# Patient Record
Sex: FEMALE | Race: OTHER | NOT HISPANIC OR LATINO | URBAN - METROPOLITAN AREA
[De-identification: names, ages, dates, MRNs, and addresses within clinical notes are randomized per-mention and may not be internally consistent; named-entity substitution may affect disease eponyms.]

---

## 2017-02-21 PROBLEM — Z00.00 ENCOUNTER FOR PREVENTIVE HEALTH EXAMINATION: Status: ACTIVE | Noted: 2017-02-21

## 2017-05-12 PROBLEM — Z00.00 ENCOUNTER FOR PREVENTIVE HEALTH EXAMINATION: Noted: 2017-05-12

## 2017-05-15 ENCOUNTER — OUTPATIENT (OUTPATIENT)
Dept: OUTPATIENT SERVICES | Facility: HOSPITAL | Age: 67
LOS: 1 days | End: 2017-05-15
Payer: MEDICARE

## 2017-05-15 ENCOUNTER — APPOINTMENT (OUTPATIENT)
Dept: CT IMAGING | Facility: IMAGING CENTER | Age: 67
End: 2017-05-15

## 2017-05-15 DIAGNOSIS — E61.1 IRON DEFICIENCY: ICD-10-CM

## 2017-05-15 DIAGNOSIS — K92.0 HEMATEMESIS: ICD-10-CM

## 2017-05-15 DIAGNOSIS — R10.9 UNSPECIFIED ABDOMINAL PAIN: ICD-10-CM

## 2017-05-15 DIAGNOSIS — K92.1 MELENA: ICD-10-CM

## 2017-05-15 PROCEDURE — 74177 CT ABD & PELVIS W/CONTRAST: CPT

## 2017-06-02 ENCOUNTER — APPOINTMENT (OUTPATIENT)
Dept: GASTROENTEROLOGY | Facility: HOSPITAL | Age: 67
End: 2017-06-02

## 2017-06-29 ENCOUNTER — RESULT REVIEW (OUTPATIENT)
Age: 67
End: 2017-06-29

## 2017-07-14 ENCOUNTER — OUTPATIENT (OUTPATIENT)
Dept: OUTPATIENT SERVICES | Facility: HOSPITAL | Age: 67
LOS: 1 days | End: 2017-07-14
Payer: MEDICARE

## 2017-07-14 ENCOUNTER — CHART COPY (OUTPATIENT)
Age: 67
End: 2017-07-14

## 2017-07-14 ENCOUNTER — RESULT REVIEW (OUTPATIENT)
Age: 67
End: 2017-07-14

## 2017-07-14 ENCOUNTER — APPOINTMENT (OUTPATIENT)
Dept: GASTROENTEROLOGY | Facility: HOSPITAL | Age: 67
End: 2017-07-14

## 2017-07-14 DIAGNOSIS — K31.9 DISEASE OF STOMACH AND DUODENUM, UNSPECIFIED: ICD-10-CM

## 2017-07-14 PROCEDURE — 88305 TISSUE EXAM BY PATHOLOGIST: CPT

## 2017-07-14 PROCEDURE — 88312 SPECIAL STAINS GROUP 1: CPT

## 2017-07-14 PROCEDURE — 43236 UPPR GI SCOPE W/SUBMUC INJ: CPT | Mod: 59,GC

## 2017-07-14 PROCEDURE — 88341 IMHCHEM/IMCYTCHM EA ADD ANTB: CPT | Mod: 26

## 2017-07-14 PROCEDURE — 88341 IMHCHEM/IMCYTCHM EA ADD ANTB: CPT

## 2017-07-14 PROCEDURE — 43239 EGD BIOPSY SINGLE/MULTIPLE: CPT | Mod: 59,GC

## 2017-07-14 PROCEDURE — 88173 CYTOPATH EVAL FNA REPORT: CPT

## 2017-07-14 PROCEDURE — 88173 CYTOPATH EVAL FNA REPORT: CPT | Mod: 26

## 2017-07-14 PROCEDURE — 88342 IMHCHEM/IMCYTCHM 1ST ANTB: CPT

## 2017-07-14 PROCEDURE — 43236 UPPR GI SCOPE W/SUBMUC INJ: CPT | Mod: XS

## 2017-07-14 PROCEDURE — 88342 IMHCHEM/IMCYTCHM 1ST ANTB: CPT | Mod: 26

## 2017-07-14 PROCEDURE — 88305 TISSUE EXAM BY PATHOLOGIST: CPT | Mod: 26

## 2017-07-14 PROCEDURE — 43239 EGD BIOPSY SINGLE/MULTIPLE: CPT

## 2017-07-14 PROCEDURE — 88312 SPECIAL STAINS GROUP 1: CPT | Mod: 26

## 2017-07-14 PROCEDURE — 43242 EGD US FINE NEEDLE BX/ASPIR: CPT | Mod: GC

## 2017-07-14 PROCEDURE — 43259 EGD US EXAM DUODENUM/JEJUNUM: CPT | Mod: XS

## 2017-07-19 LAB — NON-GYNECOLOGICAL CYTOLOGY STUDY: SIGNIFICANT CHANGE UP

## 2017-07-27 ENCOUNTER — CHART COPY (OUTPATIENT)
Age: 67
End: 2017-07-27

## 2017-08-03 ENCOUNTER — APPOINTMENT (OUTPATIENT)
Dept: SURGICAL ONCOLOGY | Facility: CLINIC | Age: 67
End: 2017-08-03
Payer: MEDICARE

## 2017-08-03 VITALS
HEART RATE: 70 BPM | SYSTOLIC BLOOD PRESSURE: 151 MMHG | WEIGHT: 174 LBS | BODY MASS INDEX: 28.99 KG/M2 | HEIGHT: 65 IN | OXYGEN SATURATION: 98 % | DIASTOLIC BLOOD PRESSURE: 118 MMHG

## 2017-08-03 DIAGNOSIS — Z86.79 PERSONAL HISTORY OF OTHER DISEASES OF THE CIRCULATORY SYSTEM: ICD-10-CM

## 2017-08-03 DIAGNOSIS — R10.9 UNSPECIFIED ABDOMINAL PAIN: ICD-10-CM

## 2017-08-03 DIAGNOSIS — K31.9 DISEASE OF STOMACH AND DUODENUM, UNSPECIFIED: ICD-10-CM

## 2017-08-03 PROCEDURE — 99204 OFFICE O/P NEW MOD 45 MIN: CPT

## 2017-08-03 RX ORDER — PREDNISONE 10 MG/1
10 TABLET ORAL
Refills: 0 | Status: ACTIVE | COMMUNITY

## 2017-08-03 RX ORDER — FUROSEMIDE 20 MG/1
20 TABLET ORAL
Refills: 0 | Status: ACTIVE | COMMUNITY

## 2017-08-03 RX ORDER — AMLODIPINE BESYLATE 10 MG/1
10 TABLET ORAL
Refills: 0 | Status: ACTIVE | COMMUNITY

## 2017-08-03 RX ORDER — LISINOPRIL 20 MG/1
20 TABLET ORAL
Refills: 0 | Status: ACTIVE | COMMUNITY

## 2017-08-09 ENCOUNTER — APPOINTMENT (OUTPATIENT)
Dept: CT IMAGING | Facility: IMAGING CENTER | Age: 67
End: 2017-08-09
Payer: MEDICARE

## 2017-08-09 ENCOUNTER — OUTPATIENT (OUTPATIENT)
Dept: OUTPATIENT SERVICES | Facility: HOSPITAL | Age: 67
LOS: 1 days | End: 2017-08-09
Payer: MEDICARE

## 2017-08-09 DIAGNOSIS — K31.9 DISEASE OF STOMACH AND DUODENUM, UNSPECIFIED: ICD-10-CM

## 2017-08-09 PROCEDURE — 71260 CT THORAX DX C+: CPT

## 2017-08-09 PROCEDURE — 74177 CT ABD & PELVIS W/CONTRAST: CPT

## 2017-08-09 PROCEDURE — 74177 CT ABD & PELVIS W/CONTRAST: CPT | Mod: 26

## 2017-08-09 PROCEDURE — 71260 CT THORAX DX C+: CPT | Mod: 26

## 2017-08-23 ENCOUNTER — APPOINTMENT (OUTPATIENT)
Dept: RHEUMATOLOGY | Facility: CLINIC | Age: 67
End: 2017-08-23

## 2017-08-24 ENCOUNTER — OUTPATIENT (OUTPATIENT)
Dept: OUTPATIENT SERVICES | Facility: HOSPITAL | Age: 67
LOS: 1 days | End: 2017-08-24
Payer: MEDICARE

## 2017-08-24 VITALS
TEMPERATURE: 99 F | SYSTOLIC BLOOD PRESSURE: 132 MMHG | HEART RATE: 70 BPM | WEIGHT: 171.96 LBS | HEIGHT: 61 IN | DIASTOLIC BLOOD PRESSURE: 84 MMHG | RESPIRATION RATE: 16 BRPM

## 2017-08-24 DIAGNOSIS — C16.9 MALIGNANT NEOPLASM OF STOMACH, UNSPECIFIED: ICD-10-CM

## 2017-08-24 DIAGNOSIS — K31.9 DISEASE OF STOMACH AND DUODENUM, UNSPECIFIED: ICD-10-CM

## 2017-08-24 DIAGNOSIS — Z98.890 OTHER SPECIFIED POSTPROCEDURAL STATES: Chronic | ICD-10-CM

## 2017-08-24 LAB
ALBUMIN SERPL ELPH-MCNC: 4 G/DL — SIGNIFICANT CHANGE UP (ref 3.3–5)
ALP SERPL-CCNC: 82 U/L — SIGNIFICANT CHANGE UP (ref 40–120)
ALT FLD-CCNC: 14 U/L — SIGNIFICANT CHANGE UP (ref 4–33)
AST SERPL-CCNC: 21 U/L — SIGNIFICANT CHANGE UP (ref 4–32)
BILIRUB SERPL-MCNC: 0.3 MG/DL — SIGNIFICANT CHANGE UP (ref 0.2–1.2)
BLD GP AB SCN SERPL QL: NEGATIVE — SIGNIFICANT CHANGE UP
BUN SERPL-MCNC: 13 MG/DL — SIGNIFICANT CHANGE UP (ref 7–23)
CALCIUM SERPL-MCNC: 9.3 MG/DL — SIGNIFICANT CHANGE UP (ref 8.4–10.5)
CHLORIDE SERPL-SCNC: 105 MMOL/L — SIGNIFICANT CHANGE UP (ref 98–107)
CO2 SERPL-SCNC: 25 MMOL/L — SIGNIFICANT CHANGE UP (ref 22–31)
CREAT SERPL-MCNC: 0.66 MG/DL — SIGNIFICANT CHANGE UP (ref 0.5–1.3)
GLUCOSE SERPL-MCNC: 95 MG/DL — SIGNIFICANT CHANGE UP (ref 70–99)
HBA1C BLD-MCNC: 5.7 % — HIGH (ref 4–5.6)
HCT VFR BLD CALC: 34 % — LOW (ref 34.5–45)
HGB BLD-MCNC: 10.6 G/DL — LOW (ref 11.5–15.5)
MCHC RBC-ENTMCNC: 25.5 PG — LOW (ref 27–34)
MCHC RBC-ENTMCNC: 31.2 % — LOW (ref 32–36)
MCV RBC AUTO: 81.9 FL — SIGNIFICANT CHANGE UP (ref 80–100)
NRBC # FLD: 0 — SIGNIFICANT CHANGE UP
PLATELET # BLD AUTO: 284 K/UL — SIGNIFICANT CHANGE UP (ref 150–400)
PMV BLD: 10.9 FL — SIGNIFICANT CHANGE UP (ref 7–13)
POTASSIUM SERPL-MCNC: 3.8 MMOL/L — SIGNIFICANT CHANGE UP (ref 3.5–5.3)
POTASSIUM SERPL-SCNC: 3.8 MMOL/L — SIGNIFICANT CHANGE UP (ref 3.5–5.3)
PROT SERPL-MCNC: 6.6 G/DL — SIGNIFICANT CHANGE UP (ref 6–8.3)
RBC # BLD: 4.15 M/UL — SIGNIFICANT CHANGE UP (ref 3.8–5.2)
RBC # FLD: 15 % — HIGH (ref 10.3–14.5)
RH IG SCN BLD-IMP: POSITIVE — SIGNIFICANT CHANGE UP
SODIUM SERPL-SCNC: 143 MMOL/L — SIGNIFICANT CHANGE UP (ref 135–145)
WBC # BLD: 5.35 K/UL — SIGNIFICANT CHANGE UP (ref 3.8–10.5)
WBC # FLD AUTO: 5.35 K/UL — SIGNIFICANT CHANGE UP (ref 3.8–10.5)

## 2017-08-24 PROCEDURE — 93010 ELECTROCARDIOGRAM REPORT: CPT

## 2017-08-24 RX ORDER — SODIUM CHLORIDE 9 MG/ML
1000 INJECTION, SOLUTION INTRAVENOUS
Qty: 0 | Refills: 0 | Status: DISCONTINUED | OUTPATIENT
Start: 2017-09-06 | End: 2017-09-06

## 2017-08-24 NOTE — H&P PST ADULT - ACTIVITY
utilizes stairs in the home daily, denies SOB, utilizes subway stairs 5 days/wk, only SOB with subway stair climbing "when I'm carrying something heavy"

## 2017-08-24 NOTE — H&P PST ADULT - VISION (WITH CORRECTIVE LENSES IF THE PATIENT USUALLY WEARS THEM):
Glasses for reading/Normal vision: sees adequately in most situations; can see medication labels, newsprint

## 2017-08-24 NOTE — H&P PST ADULT - PMH
Hypertension    RA (rheumatoid arthritis)    Stomach neoplasm Hypertension    Obese    RA (rheumatoid arthritis)    Stomach cancer Hypertension    Obese    RA (rheumatoid arthritis)    Stomach cancer    Thyroid nodule

## 2017-08-24 NOTE — H&P PST ADULT - ATTENDING COMMENTS
D/w pt plan for robotic poss open partial gastrectomy, EGD and poss feeding jejunostomy (if proximal gastrectomy)    Discussed r/b/a post op expectations, poss complications.      Pt understands and agrees to proceed.

## 2017-08-24 NOTE — H&P PST ADULT - PROBLEM SELECTOR PLAN 1
Pt. is scheduled for a laparoscopic robotic assisted partial gastrectomy possible open upper EGD 9/6/17.  Instructed pt. to obtain medical clearance.

## 2017-08-24 NOTE — H&P PST ADULT - HISTORY OF PRESENT ILLNESS
Pt. is a 67 yo female that has anemia of unknown origin.  Diagnostic testing revealed a stomach mass.

## 2017-09-06 ENCOUNTER — INPATIENT (INPATIENT)
Facility: HOSPITAL | Age: 67
LOS: 4 days | Discharge: NURSING, REHAB OR LONG-TERM CARE | End: 2017-09-11
Attending: SURGERY | Admitting: SURGERY
Payer: MEDICARE

## 2017-09-06 ENCOUNTER — TRANSCRIPTION ENCOUNTER (OUTPATIENT)
Age: 67
End: 2017-09-06

## 2017-09-06 ENCOUNTER — APPOINTMENT (OUTPATIENT)
Dept: SURGICAL ONCOLOGY | Facility: HOSPITAL | Age: 67
End: 2017-09-06

## 2017-09-06 VITALS
TEMPERATURE: 98 F | DIASTOLIC BLOOD PRESSURE: 68 MMHG | SYSTOLIC BLOOD PRESSURE: 150 MMHG | HEART RATE: 68 BPM | OXYGEN SATURATION: 99 % | RESPIRATION RATE: 16 BRPM | WEIGHT: 171.96 LBS | HEIGHT: 61 IN

## 2017-09-06 DIAGNOSIS — K31.9 DISEASE OF STOMACH AND DUODENUM, UNSPECIFIED: ICD-10-CM

## 2017-09-06 DIAGNOSIS — Z98.890 OTHER SPECIFIED POSTPROCEDURAL STATES: Chronic | ICD-10-CM

## 2017-09-06 LAB
BUN SERPL-MCNC: 16 MG/DL — SIGNIFICANT CHANGE UP (ref 7–23)
CALCIUM SERPL-MCNC: 8.9 MG/DL — SIGNIFICANT CHANGE UP (ref 8.4–10.5)
CHLORIDE SERPL-SCNC: 106 MMOL/L — SIGNIFICANT CHANGE UP (ref 98–107)
CO2 SERPL-SCNC: 23 MMOL/L — SIGNIFICANT CHANGE UP (ref 22–31)
CREAT SERPL-MCNC: 0.67 MG/DL — SIGNIFICANT CHANGE UP (ref 0.5–1.3)
GLUCOSE SERPL-MCNC: 115 MG/DL — HIGH (ref 70–99)
HCT VFR BLD CALC: 33 % — LOW (ref 34.5–45)
HGB BLD-MCNC: 10.4 G/DL — LOW (ref 11.5–15.5)
HIV1 AG SER QL: SIGNIFICANT CHANGE UP
HIV1+2 AB SPEC QL: SIGNIFICANT CHANGE UP
MCHC RBC-ENTMCNC: 25.4 PG — LOW (ref 27–34)
MCHC RBC-ENTMCNC: 31.5 % — LOW (ref 32–36)
MCV RBC AUTO: 80.7 FL — SIGNIFICANT CHANGE UP (ref 80–100)
NRBC # FLD: 0 — SIGNIFICANT CHANGE UP
PLATELET # BLD AUTO: 250 K/UL — SIGNIFICANT CHANGE UP (ref 150–400)
PMV BLD: 10.6 FL — SIGNIFICANT CHANGE UP (ref 7–13)
POTASSIUM SERPL-MCNC: 3.5 MMOL/L — SIGNIFICANT CHANGE UP (ref 3.5–5.3)
POTASSIUM SERPL-SCNC: 3.5 MMOL/L — SIGNIFICANT CHANGE UP (ref 3.5–5.3)
RBC # BLD: 4.09 M/UL — SIGNIFICANT CHANGE UP (ref 3.8–5.2)
RBC # FLD: 14.9 % — HIGH (ref 10.3–14.5)
RH IG SCN BLD-IMP: POSITIVE — SIGNIFICANT CHANGE UP
SODIUM SERPL-SCNC: 142 MMOL/L — SIGNIFICANT CHANGE UP (ref 135–145)
WBC # BLD: 13.26 K/UL — HIGH (ref 3.8–10.5)
WBC # FLD AUTO: 13.26 K/UL — HIGH (ref 3.8–10.5)

## 2017-09-06 PROCEDURE — 49204: CPT

## 2017-09-06 PROCEDURE — 43235 EGD DIAGNOSTIC BRUSH WASH: CPT

## 2017-09-06 PROCEDURE — 49320 DIAG LAPARO SEPARATE PROC: CPT | Mod: 59

## 2017-09-06 PROCEDURE — 43611 EXCISION OF STOMACH LESION: CPT

## 2017-09-06 PROCEDURE — S2900 ROBOTIC SURGICAL SYSTEM: CPT | Mod: NC

## 2017-09-06 PROCEDURE — 51702 INSERT TEMP BLADDER CATH: CPT | Mod: 59

## 2017-09-06 RX ORDER — ACETAMINOPHEN 500 MG
1000 TABLET ORAL ONCE
Qty: 0 | Refills: 0 | Status: COMPLETED | OUTPATIENT
Start: 2017-09-06 | End: 2017-09-06

## 2017-09-06 RX ORDER — ONDANSETRON 8 MG/1
4 TABLET, FILM COATED ORAL ONCE
Qty: 0 | Refills: 0 | Status: DISCONTINUED | OUTPATIENT
Start: 2017-09-06 | End: 2017-09-06

## 2017-09-06 RX ORDER — ONDANSETRON 8 MG/1
4 TABLET, FILM COATED ORAL EVERY 6 HOURS
Qty: 0 | Refills: 0 | Status: DISCONTINUED | OUTPATIENT
Start: 2017-09-06 | End: 2017-09-08

## 2017-09-06 RX ORDER — HYDROMORPHONE HYDROCHLORIDE 2 MG/ML
30 INJECTION INTRAMUSCULAR; INTRAVENOUS; SUBCUTANEOUS
Qty: 0 | Refills: 0 | Status: DISCONTINUED | OUTPATIENT
Start: 2017-09-06 | End: 2017-09-08

## 2017-09-06 RX ORDER — HYDROMORPHONE HYDROCHLORIDE 2 MG/ML
1 INJECTION INTRAMUSCULAR; INTRAVENOUS; SUBCUTANEOUS
Qty: 0 | Refills: 0 | Status: DISCONTINUED | OUTPATIENT
Start: 2017-09-06 | End: 2017-09-06

## 2017-09-06 RX ORDER — DIPHENHYDRAMINE HCL 50 MG
25 CAPSULE ORAL EVERY 4 HOURS
Qty: 0 | Refills: 0 | Status: DISCONTINUED | OUTPATIENT
Start: 2017-09-06 | End: 2017-09-08

## 2017-09-06 RX ORDER — KETOROLAC TROMETHAMINE 30 MG/ML
30 SYRINGE (ML) INJECTION EVERY 6 HOURS
Qty: 0 | Refills: 0 | Status: DISCONTINUED | OUTPATIENT
Start: 2017-09-07 | End: 2017-09-11

## 2017-09-06 RX ORDER — SODIUM CHLORIDE 9 MG/ML
1000 INJECTION, SOLUTION INTRAVENOUS
Qty: 0 | Refills: 0 | Status: DISCONTINUED | OUTPATIENT
Start: 2017-09-06 | End: 2017-09-07

## 2017-09-06 RX ORDER — NALOXONE HYDROCHLORIDE 4 MG/.1ML
0.1 SPRAY NASAL
Qty: 0 | Refills: 0 | Status: DISCONTINUED | OUTPATIENT
Start: 2017-09-06 | End: 2017-09-08

## 2017-09-06 RX ORDER — ACETAMINOPHEN 500 MG
1000 TABLET ORAL ONCE
Qty: 0 | Refills: 0 | Status: COMPLETED | OUTPATIENT
Start: 2017-09-07 | End: 2017-09-07

## 2017-09-06 RX ORDER — HEPARIN SODIUM 5000 [USP'U]/ML
5000 INJECTION INTRAVENOUS; SUBCUTANEOUS EVERY 8 HOURS
Qty: 0 | Refills: 0 | Status: DISCONTINUED | OUTPATIENT
Start: 2017-09-06 | End: 2017-09-07

## 2017-09-06 RX ORDER — HYDROMORPHONE HYDROCHLORIDE 2 MG/ML
0.5 INJECTION INTRAMUSCULAR; INTRAVENOUS; SUBCUTANEOUS
Qty: 0 | Refills: 0 | Status: DISCONTINUED | OUTPATIENT
Start: 2017-09-06 | End: 2017-09-06

## 2017-09-06 RX ORDER — PANTOPRAZOLE SODIUM 20 MG/1
40 TABLET, DELAYED RELEASE ORAL
Qty: 0 | Refills: 0 | Status: DISCONTINUED | OUTPATIENT
Start: 2017-09-06 | End: 2017-09-09

## 2017-09-06 RX ORDER — HEPARIN SODIUM 5000 [USP'U]/ML
5000 INJECTION INTRAVENOUS; SUBCUTANEOUS ONCE
Qty: 0 | Refills: 0 | Status: COMPLETED | OUTPATIENT
Start: 2017-09-06 | End: 2017-09-06

## 2017-09-06 RX ORDER — HYDROMORPHONE HYDROCHLORIDE 2 MG/ML
0.5 INJECTION INTRAMUSCULAR; INTRAVENOUS; SUBCUTANEOUS
Qty: 0 | Refills: 0 | Status: DISCONTINUED | OUTPATIENT
Start: 2017-09-06 | End: 2017-09-08

## 2017-09-06 RX ADMIN — PANTOPRAZOLE SODIUM 40 MILLIGRAM(S): 20 TABLET, DELAYED RELEASE ORAL at 18:43

## 2017-09-06 RX ADMIN — Medication 400 MILLIGRAM(S): at 22:26

## 2017-09-06 RX ADMIN — SODIUM CHLORIDE 30 MILLILITER(S): 9 INJECTION, SOLUTION INTRAVENOUS at 11:55

## 2017-09-06 RX ADMIN — HYDROMORPHONE HYDROCHLORIDE 30 MILLILITER(S): 2 INJECTION INTRAMUSCULAR; INTRAVENOUS; SUBCUTANEOUS at 17:55

## 2017-09-06 RX ADMIN — HEPARIN SODIUM 5000 UNIT(S): 5000 INJECTION INTRAVENOUS; SUBCUTANEOUS at 11:58

## 2017-09-06 RX ADMIN — HEPARIN SODIUM 5000 UNIT(S): 5000 INJECTION INTRAVENOUS; SUBCUTANEOUS at 22:32

## 2017-09-06 RX ADMIN — Medication 1000 MILLIGRAM(S): at 23:00

## 2017-09-06 NOTE — BRIEF OPERATIVE NOTE - OPERATION/FINDINGS
-EGD performed, mass identified in fundus  -robotic laparoscopic partial gastrectomy (stapled resection of mass, no violation of GE junction)  -post-resection EGD confirmed patent GE junction and good hemostasis

## 2017-09-06 NOTE — BRIEF OPERATIVE NOTE - PROCEDURE
EGD (esophagogastroduodenoscopy)  09/06/2017    Active  [id number]  Partial gastrectomy  09/06/2017  robotic laparoscopic  Active  [id number]

## 2017-09-07 ENCOUNTER — RESULT REVIEW (OUTPATIENT)
Age: 67
End: 2017-09-07

## 2017-09-07 LAB
BUN SERPL-MCNC: 8 MG/DL — SIGNIFICANT CHANGE UP (ref 7–23)
CALCIUM SERPL-MCNC: 8.7 MG/DL — SIGNIFICANT CHANGE UP (ref 8.4–10.5)
CHLORIDE SERPL-SCNC: 105 MMOL/L — SIGNIFICANT CHANGE UP (ref 98–107)
CO2 SERPL-SCNC: 25 MMOL/L — SIGNIFICANT CHANGE UP (ref 22–31)
CREAT SERPL-MCNC: 0.56 MG/DL — SIGNIFICANT CHANGE UP (ref 0.5–1.3)
GLUCOSE SERPL-MCNC: 90 MG/DL — SIGNIFICANT CHANGE UP (ref 70–99)
HBV CORE AB SER-ACNC: REACTIVE — SIGNIFICANT CHANGE UP
HBV SURFACE AB SER-ACNC: REACTIVE — SIGNIFICANT CHANGE UP
HBV SURFACE AG SER-ACNC: NONREACTIVE — SIGNIFICANT CHANGE UP
HCT VFR BLD CALC: 31.1 % — LOW (ref 34.5–45)
HCV AB S/CO SERPL IA: 0.09 S/CO — SIGNIFICANT CHANGE UP
HCV AB SERPL-IMP: SIGNIFICANT CHANGE UP
HCV RNA SERPL NAA DL=5-ACNC: NOT DETECTED IU/ML — SIGNIFICANT CHANGE UP
HCV RNA SPEC NAA+PROBE-LOG IU: SIGNIFICANT CHANGE UP LOGIU/ML
HGB BLD-MCNC: 9.9 G/DL — LOW (ref 11.5–15.5)
MCHC RBC-ENTMCNC: 25.9 PG — LOW (ref 27–34)
MCHC RBC-ENTMCNC: 31.8 % — LOW (ref 32–36)
MCV RBC AUTO: 81.4 FL — SIGNIFICANT CHANGE UP (ref 80–100)
NRBC # FLD: 0 — SIGNIFICANT CHANGE UP
PLATELET # BLD AUTO: 222 K/UL — SIGNIFICANT CHANGE UP (ref 150–400)
PMV BLD: 10.6 FL — SIGNIFICANT CHANGE UP (ref 7–13)
POTASSIUM SERPL-MCNC: 3.7 MMOL/L — SIGNIFICANT CHANGE UP (ref 3.5–5.3)
POTASSIUM SERPL-SCNC: 3.7 MMOL/L — SIGNIFICANT CHANGE UP (ref 3.5–5.3)
RBC # BLD: 3.82 M/UL — SIGNIFICANT CHANGE UP (ref 3.8–5.2)
RBC # FLD: 14.8 % — HIGH (ref 10.3–14.5)
SODIUM SERPL-SCNC: 142 MMOL/L — SIGNIFICANT CHANGE UP (ref 135–145)
WBC # BLD: 7.1 K/UL — SIGNIFICANT CHANGE UP (ref 3.8–10.5)
WBC # FLD AUTO: 7.1 K/UL — SIGNIFICANT CHANGE UP (ref 3.8–10.5)

## 2017-09-07 PROCEDURE — 88309 TISSUE EXAM BY PATHOLOGIST: CPT | Mod: 26

## 2017-09-07 PROCEDURE — 88341 IMHCHEM/IMCYTCHM EA ADD ANTB: CPT | Mod: 26

## 2017-09-07 PROCEDURE — 88342 IMHCHEM/IMCYTCHM 1ST ANTB: CPT | Mod: 26

## 2017-09-07 RX ORDER — DEXTROSE MONOHYDRATE, SODIUM CHLORIDE, AND POTASSIUM CHLORIDE 50; .745; 4.5 G/1000ML; G/1000ML; G/1000ML
1000 INJECTION, SOLUTION INTRAVENOUS
Qty: 0 | Refills: 0 | Status: DISCONTINUED | OUTPATIENT
Start: 2017-09-07 | End: 2017-09-08

## 2017-09-07 RX ORDER — ENOXAPARIN SODIUM 100 MG/ML
40 INJECTION SUBCUTANEOUS DAILY
Qty: 0 | Refills: 0 | Status: DISCONTINUED | OUTPATIENT
Start: 2017-09-07 | End: 2017-09-11

## 2017-09-07 RX ORDER — POTASSIUM CHLORIDE 20 MEQ
10 PACKET (EA) ORAL
Qty: 0 | Refills: 0 | Status: COMPLETED | OUTPATIENT
Start: 2017-09-07 | End: 2017-09-07

## 2017-09-07 RX ADMIN — DEXTROSE MONOHYDRATE, SODIUM CHLORIDE, AND POTASSIUM CHLORIDE 115 MILLILITER(S): 50; .745; 4.5 INJECTION, SOLUTION INTRAVENOUS at 16:48

## 2017-09-07 RX ADMIN — Medication 1000 MILLIGRAM(S): at 11:17

## 2017-09-07 RX ADMIN — Medication 30 MILLIGRAM(S): at 18:48

## 2017-09-07 RX ADMIN — HEPARIN SODIUM 5000 UNIT(S): 5000 INJECTION INTRAVENOUS; SUBCUTANEOUS at 14:36

## 2017-09-07 RX ADMIN — Medication 30 MILLIGRAM(S): at 07:00

## 2017-09-07 RX ADMIN — Medication 1000 MILLIGRAM(S): at 06:02

## 2017-09-07 RX ADMIN — Medication 30 MILLIGRAM(S): at 01:21

## 2017-09-07 RX ADMIN — Medication 400 MILLIGRAM(S): at 05:32

## 2017-09-07 RX ADMIN — Medication 100 MILLIEQUIVALENT(S): at 12:31

## 2017-09-07 RX ADMIN — PANTOPRAZOLE SODIUM 40 MILLIGRAM(S): 20 TABLET, DELAYED RELEASE ORAL at 05:31

## 2017-09-07 RX ADMIN — Medication 30 MILLIGRAM(S): at 13:12

## 2017-09-07 RX ADMIN — Medication 100 MILLIEQUIVALENT(S): at 14:36

## 2017-09-07 RX ADMIN — Medication 400 MILLIGRAM(S): at 10:38

## 2017-09-07 RX ADMIN — Medication 100 MILLIEQUIVALENT(S): at 16:45

## 2017-09-07 RX ADMIN — HEPARIN SODIUM 5000 UNIT(S): 5000 INJECTION INTRAVENOUS; SUBCUTANEOUS at 05:31

## 2017-09-07 RX ADMIN — Medication 30 MILLIGRAM(S): at 06:25

## 2017-09-07 RX ADMIN — PANTOPRAZOLE SODIUM 40 MILLIGRAM(S): 20 TABLET, DELAYED RELEASE ORAL at 18:41

## 2017-09-07 RX ADMIN — HYDROMORPHONE HYDROCHLORIDE 30 MILLILITER(S): 2 INJECTION INTRAMUSCULAR; INTRAVENOUS; SUBCUTANEOUS at 07:08

## 2017-09-07 RX ADMIN — Medication 30 MILLIGRAM(S): at 18:41

## 2017-09-07 RX ADMIN — SODIUM CHLORIDE 125 MILLILITER(S): 9 INJECTION, SOLUTION INTRAVENOUS at 11:20

## 2017-09-07 RX ADMIN — ENOXAPARIN SODIUM 40 MILLIGRAM(S): 100 INJECTION SUBCUTANEOUS at 21:53

## 2017-09-07 RX ADMIN — Medication 30 MILLIGRAM(S): at 02:00

## 2017-09-07 RX ADMIN — Medication 30 MILLIGRAM(S): at 12:32

## 2017-09-07 RX ADMIN — Medication 1000 MILLIGRAM(S): at 17:07

## 2017-09-07 RX ADMIN — Medication 400 MILLIGRAM(S): at 16:48

## 2017-09-07 RX ADMIN — HYDROMORPHONE HYDROCHLORIDE 30 MILLILITER(S): 2 INJECTION INTRAMUSCULAR; INTRAVENOUS; SUBCUTANEOUS at 19:17

## 2017-09-07 NOTE — PROGRESS NOTE ADULT - SUBJECTIVE AND OBJECTIVE BOX
Anesthesia Pain Management Service    SUBJECTIVE: Patient is doing well with IV PCA and no significant problems reported.    Pain Scale Score	At rest: ___ 	With Activity: ___ 	[X ] Refer to charted pain scores    THERAPY:    [ ] IV PCA Morphine		[ ] 5 mg/mL	[ ] 1 mg/mL  [X ] IV PCA Hydromorphone	[ ] 5 mg/mL	[X ] 1 mg/mL  [ ] IV PCA Fentanyl		[ ] 50 micrograms/mL    Demand dose __0.2_ lockout __6_ (minutes) Continuous Rate _0__ Total: _1__  mg used (in past 24 hours)      MEDICATIONS  (STANDING):  HYDROmorphone PCA (1 mG/mL) 30 milliLiter(s) PCA Continuous PCA Continuous  ketorolac   Injectable 30 milliGRAM(s) IV Push every 6 hours  pantoprazole  Injectable 40 milliGRAM(s) IV Push two times a day  dextrose 5% + sodium chloride 0.45% with potassium chloride 20 mEq/L 1000 milliLiter(s) (115 mL/Hr) IV Continuous <Continuous>  enoxaparin Injectable 40 milliGRAM(s) SubCutaneous daily    MEDICATIONS  (PRN):  HYDROmorphone PCA (1 mG/mL) Rescue Clinician Bolus 0.5 milliGRAM(s) IV Push every 15 minutes PRN for Pain Scale GREATER THAN 6  naloxone Injectable 0.1 milliGRAM(s) IV Push every 3 minutes PRN For ANY of the following changes in patient status:  A. RR LESS THAN 10 breaths per minute, B. Oxygen saturation LESS THAN 90%, C. Sedation score of 6  ondansetron Injectable 4 milliGRAM(s) IV Push every 6 hours PRN Nausea  diphenhydrAMINE   Injectable 25 milliGRAM(s) IV Push every 4 hours PRN Pruritus      OBJECTIVE:    Sedation Score:	[ X] Alert	[ ] Drowsy 	[ ] Arousable	[ ] Asleep	[ ] Unresponsive    Side Effects:	[X ] None	[ ] Nausea	[ ] Vomiting	[ ] Pruritus  		[ ] Other:    Vital Signs Last 24 Hrs  T(C): 36.6 (07 Sep 2017 16:28), Max: 37.2 (07 Sep 2017 07:58)  T(F): 97.8 (07 Sep 2017 16:28), Max: 98.9 (07 Sep 2017 07:58)  HR: 82 (07 Sep 2017 16:28) (61 - 85)  BP: 137/57 (07 Sep 2017 16:28) (131/60 - 149/64)  BP(mean): --  RR: 18 (07 Sep 2017 16:28) (13 - 18)  SpO2: 97% (07 Sep 2017 16:28) (97% - 100%)    ASSESSMENT/ PLAN    Therapy to  be:	[ X] Continue   [ ] Discontinued   [ ] Change to prn Analgesics    Documentation and Verification of current medications:   [X] Done	[ ] Not done, not elligible    Comments: npo    Patient was seen 09-07-17 @ 18:39

## 2017-09-07 NOTE — PROGRESS NOTE ADULT - SUBJECTIVE AND OBJECTIVE BOX
ANESTHESIA POSTOP CHECK    66y Female POSTOP DAY 1 S/P     Vital Signs Last 24 Hrs  T(C): 36.6 (07 Sep 2017 16:28), Max: 37.2 (07 Sep 2017 07:58)  T(F): 97.8 (07 Sep 2017 16:28), Max: 98.9 (07 Sep 2017 07:58)  HR: 82 (07 Sep 2017 16:28) (61 - 85)  BP: 137/57 (07 Sep 2017 16:28) (131/60 - 149/64)  BP(mean): --  RR: 18 (07 Sep 2017 16:28) (13 - 18)  SpO2: 97% (07 Sep 2017 16:28) (97% - 100%)  I&O's Summary    06 Sep 2017 07:01  -  07 Sep 2017 07:00  --------------------------------------------------------  IN: 1625 mL / OUT: 2065 mL / NET: -440 mL    07 Sep 2017 07:01  -  07 Sep 2017 18:40  --------------------------------------------------------  IN: 500 mL / OUT: 600 mL / NET: -100 mL        [X ] NO APPARENT ANESTHESIA COMPLICATIONS      Comments:

## 2017-09-07 NOTE — PROGRESS NOTE ADULT - SUBJECTIVE AND OBJECTIVE BOX
D-Team (Surgical Oncology) Daily Progress Note    SUBJECTIVE:  Pt seen and examined, and is resting comfortably in bed. No acute events overnight. Pain is adequately controlled on current regimen. Pt has no complaints at this time. Negative for flatus and BM.     OBJECTIVE:  Vital Signs Last 24 Hrs  T(C): 36.6 (07 Sep 2017 16:28), Max: 37.2 (07 Sep 2017 07:58)  T(F): 97.8 (07 Sep 2017 16:28), Max: 98.9 (07 Sep 2017 07:58)  HR: 82 (07 Sep 2017 16:28) (61 - 89)  BP: 137/57 (07 Sep 2017 16:28) (131/60 - 160/71)  BP(mean): --  RR: 18 (07 Sep 2017 16:28) (13 - 22)  SpO2: 97% (07 Sep 2017 16:28) (94% - 100%)    I&O's Detail    06 Sep 2017 07:01  -  07 Sep 2017 07:00  --------------------------------------------------------  IN:    lactated ringers.: 1625 mL  Total IN: 1625 mL    OUT:    Indwelling Catheter - Urethral: 2065 mL  Total OUT: 2065 mL    Total NET: -440 mL      07 Sep 2017 07:01  -  07 Sep 2017 17:44  --------------------------------------------------------  IN:    lactated ringers.: 500 mL  Total IN: 500 mL    OUT:    Indwelling Catheter - Urethral: 600 mL  Total OUT: 600 mL    Total NET: -100 mL          Exam:  GEN: A&Ox3, NAD  HEENT: atraumatic, normocephalic  CV: no JVD  RESP: no increased work of breathing, no use of accessory muscles  GI/ABD: soft, minimally distended, incisions c/d/i x5  : deferred  EXTREMITIES: warm, pink, well-perfused                          9.9    7.10  )-----------( 222      ( 07 Sep 2017 06:33 )             31.1       09-07    142  |  105  |  8   ----------------------------<  90  3.7   |  25  |  0.56    Ca    8.7      07 Sep 2017 06:33            ASSESSMENT:  66F s/p lap robotic-assisted partial gastrectomy    PLAN:    - CLD  - d/u tony in AM  - OOB, ambulate, IS  - F/u GI function  -DVT prophylaxis      Alda Jimenez MD PGY-1  pager: [phone]

## 2017-09-07 NOTE — PROGRESS NOTE ADULT - SUBJECTIVE AND OBJECTIVE BOX
SURGICAL ONCOLOGY PROGRESS NOTE    No complaints, resting comfortably    Vital Signs Last 24 Hrs  T(C): 37.2 (07 Sep 2017 07:58), Max: 37.2 (07 Sep 2017 07:58)  T(F): 98.9 (07 Sep 2017 07:58), Max: 98.9 (07 Sep 2017 07:58)  HR: 61 (07 Sep 2017 07:58) (61 - 89)  BP: 131/60 (07 Sep 2017 07:58) (131/60 - 170/66)  BP(mean): --  RR: 18 (07 Sep 2017 07:58) (13 - 22)  SpO2: 100% (07 Sep 2017 07:58) (94% - 100%)  I&O's Detail    06 Sep 2017 07:01  -  07 Sep 2017 07:00  --------------------------------------------------------  IN:    lactated ringers.: 1625 mL  Total IN: 1625 mL    OUT:    Indwelling Catheter - Urethral: 2065 mL  Total OUT: 2065 mL    Total NET: -440 mL          PE:    A&A  NAD    soft, NT, ND, No peritoneal signs    inc  clean, dry, intact                          9.9    7.10  )-----------( 222      ( 07 Sep 2017 06:33 )             31.1     09-07    142  |  105  |  8   ----------------------------<  90  3.7   |  25  |  0.56    Ca    8.7      07 Sep 2017 06:33

## 2017-09-08 LAB
BUN SERPL-MCNC: 5 MG/DL — LOW (ref 7–23)
CALCIUM SERPL-MCNC: 8.9 MG/DL — SIGNIFICANT CHANGE UP (ref 8.4–10.5)
CHLORIDE SERPL-SCNC: 105 MMOL/L — SIGNIFICANT CHANGE UP (ref 98–107)
CO2 SERPL-SCNC: 25 MMOL/L — SIGNIFICANT CHANGE UP (ref 22–31)
CREAT SERPL-MCNC: 0.52 MG/DL — SIGNIFICANT CHANGE UP (ref 0.5–1.3)
GLUCOSE SERPL-MCNC: 98 MG/DL — SIGNIFICANT CHANGE UP (ref 70–99)
HCT VFR BLD CALC: 32.1 % — LOW (ref 34.5–45)
HGB BLD-MCNC: 10.2 G/DL — LOW (ref 11.5–15.5)
MAGNESIUM SERPL-MCNC: 1.7 MG/DL — SIGNIFICANT CHANGE UP (ref 1.6–2.6)
MCHC RBC-ENTMCNC: 25.8 PG — LOW (ref 27–34)
MCHC RBC-ENTMCNC: 31.8 % — LOW (ref 32–36)
MCV RBC AUTO: 81.1 FL — SIGNIFICANT CHANGE UP (ref 80–100)
NRBC # FLD: 0 — SIGNIFICANT CHANGE UP
PHOSPHATE SERPL-MCNC: 3.1 MG/DL — SIGNIFICANT CHANGE UP (ref 2.5–4.5)
PLATELET # BLD AUTO: 228 K/UL — SIGNIFICANT CHANGE UP (ref 150–400)
PMV BLD: 11 FL — SIGNIFICANT CHANGE UP (ref 7–13)
POTASSIUM SERPL-MCNC: 3.9 MMOL/L — SIGNIFICANT CHANGE UP (ref 3.5–5.3)
POTASSIUM SERPL-SCNC: 3.9 MMOL/L — SIGNIFICANT CHANGE UP (ref 3.5–5.3)
RBC # BLD: 3.96 M/UL — SIGNIFICANT CHANGE UP (ref 3.8–5.2)
RBC # FLD: 14.6 % — HIGH (ref 10.3–14.5)
SODIUM SERPL-SCNC: 142 MMOL/L — SIGNIFICANT CHANGE UP (ref 135–145)
WBC # BLD: 5.79 K/UL — SIGNIFICANT CHANGE UP (ref 3.8–10.5)
WBC # FLD AUTO: 5.79 K/UL — SIGNIFICANT CHANGE UP (ref 3.8–10.5)

## 2017-09-08 RX ORDER — OXYCODONE HYDROCHLORIDE 5 MG/1
5 TABLET ORAL EVERY 6 HOURS
Qty: 0 | Refills: 0 | Status: DISCONTINUED | OUTPATIENT
Start: 2017-09-08 | End: 2017-09-11

## 2017-09-08 RX ORDER — MAGNESIUM SULFATE 500 MG/ML
2 VIAL (ML) INJECTION ONCE
Qty: 0 | Refills: 0 | Status: COMPLETED | OUTPATIENT
Start: 2017-09-08 | End: 2017-09-08

## 2017-09-08 RX ORDER — OXYCODONE HYDROCHLORIDE 5 MG/1
10 TABLET ORAL EVERY 6 HOURS
Qty: 0 | Refills: 0 | Status: DISCONTINUED | OUTPATIENT
Start: 2017-09-08 | End: 2017-09-11

## 2017-09-08 RX ADMIN — Medication 30 MILLIGRAM(S): at 14:00

## 2017-09-08 RX ADMIN — Medication 30 MILLIGRAM(S): at 02:00

## 2017-09-08 RX ADMIN — Medication 30 MILLIGRAM(S): at 06:20

## 2017-09-08 RX ADMIN — Medication 30 MILLIGRAM(S): at 13:34

## 2017-09-08 RX ADMIN — Medication 30 MILLIGRAM(S): at 06:02

## 2017-09-08 RX ADMIN — OXYCODONE HYDROCHLORIDE 10 MILLIGRAM(S): 5 TABLET ORAL at 18:27

## 2017-09-08 RX ADMIN — PANTOPRAZOLE SODIUM 40 MILLIGRAM(S): 20 TABLET, DELAYED RELEASE ORAL at 06:03

## 2017-09-08 RX ADMIN — Medication 30 MILLIGRAM(S): at 01:42

## 2017-09-08 RX ADMIN — Medication 50 GRAM(S): at 13:34

## 2017-09-08 RX ADMIN — OXYCODONE HYDROCHLORIDE 10 MILLIGRAM(S): 5 TABLET ORAL at 17:46

## 2017-09-08 RX ADMIN — Medication 30 MILLIGRAM(S): at 18:32

## 2017-09-08 RX ADMIN — ENOXAPARIN SODIUM 40 MILLIGRAM(S): 100 INJECTION SUBCUTANEOUS at 13:34

## 2017-09-08 RX ADMIN — HYDROMORPHONE HYDROCHLORIDE 30 MILLILITER(S): 2 INJECTION INTRAMUSCULAR; INTRAVENOUS; SUBCUTANEOUS at 07:06

## 2017-09-08 RX ADMIN — PANTOPRAZOLE SODIUM 40 MILLIGRAM(S): 20 TABLET, DELAYED RELEASE ORAL at 17:49

## 2017-09-08 RX ADMIN — DEXTROSE MONOHYDRATE, SODIUM CHLORIDE, AND POTASSIUM CHLORIDE 115 MILLILITER(S): 50; .745; 4.5 INJECTION, SOLUTION INTRAVENOUS at 04:29

## 2017-09-08 RX ADMIN — Medication 30 MILLIGRAM(S): at 18:27

## 2017-09-08 NOTE — PROGRESS NOTE ADULT - SUBJECTIVE AND OBJECTIVE BOX
D Team Surgery Progress Note     S: Patient resting comfortably on morning rounds. Post-operative abdominal pain well-controlled. Denies nausea or vomiting. Passing flatus. No new symptoms reported.     MEDICATIONS  (STANDING):  ketorolac   Injectable 30 milliGRAM(s) IV Push every 6 hours  pantoprazole  Injectable 40 milliGRAM(s) IV Push two times a day  enoxaparin Injectable 40 milliGRAM(s) SubCutaneous daily  magnesium sulfate  IVPB 2 Gram(s) IV Intermittent once    MEDICATIONS  (PRN):  oxyCODONE    IR 5 milliGRAM(s) Oral every 6 hours PRN Moderate Pain (4 - 6)  oxyCODONE    IR 10 milliGRAM(s) Oral every 6 hours PRN Severe Pain (7 - 10)      Physical Exam:    Afebrile. HR: 78, BP: 152/77, HR: 17, 98% RA      09-07-17 @ 07:01  -  09-08-17 @ 07:00  --------------------------------------------------------  IN: 2570 mL / OUT: 3500 mL / NET: -930 mL    09-08-17 @ 07:01  -  09-08-17 @ 13:28  --------------------------------------------------------  IN: 0 mL / OUT: 1500 mL / NET: -1500 mL        General: NAD, AOx3    Abdominal: Soft, non-distended, minimally tender, incision C/D/I         LABS:                        10.2   5.79  )-----------( 228      ( 08 Sep 2017 06:50 )             32.1     09-08    142  |  105  |  5<L>  ----------------------------<  98  3.9   |  25  |  0.52    Ca    8.9      08 Sep 2017 06:50  Phos  3.1     09-08  Mg     1.7     09-08

## 2017-09-08 NOTE — PROGRESS NOTE ADULT - ASSESSMENT
A/P: 66F s/p Lap Robotic-assisted partial Gastrectomy, POD # 2    - pain control with prn meds, d/c PCA  - D/C Garcia   - advance diet as tolerated  - OOB / ambulate  - DVT ppx  - weekend dispo planning       D Team Surgery   [id number]

## 2017-09-09 RX ORDER — PANTOPRAZOLE SODIUM 20 MG/1
40 TABLET, DELAYED RELEASE ORAL
Qty: 0 | Refills: 0 | Status: DISCONTINUED | OUTPATIENT
Start: 2017-09-09 | End: 2017-09-11

## 2017-09-09 RX ADMIN — Medication 30 MILLIGRAM(S): at 13:33

## 2017-09-09 RX ADMIN — Medication 30 MILLIGRAM(S): at 14:03

## 2017-09-09 RX ADMIN — ENOXAPARIN SODIUM 40 MILLIGRAM(S): 100 INJECTION SUBCUTANEOUS at 12:00

## 2017-09-09 RX ADMIN — PANTOPRAZOLE SODIUM 40 MILLIGRAM(S): 20 TABLET, DELAYED RELEASE ORAL at 06:00

## 2017-09-09 RX ADMIN — PANTOPRAZOLE SODIUM 40 MILLIGRAM(S): 20 TABLET, DELAYED RELEASE ORAL at 18:02

## 2017-09-09 RX ADMIN — Medication 30 MILLIGRAM(S): at 18:02

## 2017-09-09 RX ADMIN — Medication 30 MILLIGRAM(S): at 07:00

## 2017-09-09 RX ADMIN — Medication 30 MILLIGRAM(S): at 18:32

## 2017-09-09 NOTE — PROGRESS NOTE ADULT - ASSESSMENT
66F s/p lap robotic-assisted partial gastrectomy      - Advance to LRD  - OOB, ambulate, IS  - F/u GI function  - DVT prophylaxis    Seen and examined by Dr. Corley

## 2017-09-09 NOTE — PROGRESS NOTE ADULT - SUBJECTIVE AND OBJECTIVE BOX
DX:  s/p lap robotic assisted partial gastrectomy  POD#: 3          SUBJECTIVE    NAEO. Pain well controlled. Denies N/V.     10-point review of systems completed and negative except as noted above.    ketorolac   Injectable 30 milliGRAM(s) IV Push every 6 hours  pantoprazole  Injectable 40 milliGRAM(s) IV Push two times a day  enoxaparin Injectable 40 milliGRAM(s) SubCutaneous daily  oxyCODONE    IR 5 milliGRAM(s) Oral every 6 hours PRN  oxyCODONE    IR 10 milliGRAM(s) Oral every 6 hours PRN            OBJECTIVE    T(C): 36.8 (09-08-17 @ 21:09), Max: 36.9 (09-08-17 @ 18:04)  HR: 78 (09-08-17 @ 21:09) (78 - 90)  BP: 134/74 (09-08-17 @ 21:09) (134/74 - 145/73)  RR: 18 (09-08-17 @ 21:09) (18 - 18)  SpO2: 100% (09-08-17 @ 21:09) (96% - 100%)    09-08-17 @ 07:01  -  09-09-17 @ 07:00  --------------------------------------------------------  IN: 0 mL / OUT: 2000 mL / NET: -2000 mL        EXAM  GEN: A&Ox3, NAD  HEENT: atraumatic, normocephalic  CV: no JVD  RESP: no increased work of breathing, no use of accessory muscles  GI/ABD: soft, minimally distended, incisions c/d/i x5  EXTREMITIES: warm, pink, well-perfused    LABS                        10.2   5.79  )-----------( 228      ( 08 Sep 2017 06:50 )             32.1     09-08    142  |  105  |  5<L>  ----------------------------<  98  3.9   |  25  |  0.52    Ca    8.9      08 Sep 2017 06:50  Phos  3.1     09-08  Mg     1.7     09-08

## 2017-09-10 LAB
BUN SERPL-MCNC: 11 MG/DL — SIGNIFICANT CHANGE UP (ref 7–23)
CALCIUM SERPL-MCNC: 9.2 MG/DL — SIGNIFICANT CHANGE UP (ref 8.4–10.5)
CHLORIDE SERPL-SCNC: 103 MMOL/L — SIGNIFICANT CHANGE UP (ref 98–107)
CO2 SERPL-SCNC: 25 MMOL/L — SIGNIFICANT CHANGE UP (ref 22–31)
CREAT SERPL-MCNC: 0.59 MG/DL — SIGNIFICANT CHANGE UP (ref 0.5–1.3)
GLUCOSE SERPL-MCNC: 98 MG/DL — SIGNIFICANT CHANGE UP (ref 70–99)
HCT VFR BLD CALC: 34.5 % — SIGNIFICANT CHANGE UP (ref 34.5–45)
HGB BLD-MCNC: 11.1 G/DL — LOW (ref 11.5–15.5)
MAGNESIUM SERPL-MCNC: 1.8 MG/DL — SIGNIFICANT CHANGE UP (ref 1.6–2.6)
MCHC RBC-ENTMCNC: 25.6 PG — LOW (ref 27–34)
MCHC RBC-ENTMCNC: 32.2 % — SIGNIFICANT CHANGE UP (ref 32–36)
MCV RBC AUTO: 79.7 FL — LOW (ref 80–100)
NRBC # FLD: 0 — SIGNIFICANT CHANGE UP
PHOSPHATE SERPL-MCNC: 4.1 MG/DL — SIGNIFICANT CHANGE UP (ref 2.5–4.5)
PLATELET # BLD AUTO: 234 K/UL — SIGNIFICANT CHANGE UP (ref 150–400)
PMV BLD: 11.1 FL — SIGNIFICANT CHANGE UP (ref 7–13)
POTASSIUM SERPL-MCNC: 3.8 MMOL/L — SIGNIFICANT CHANGE UP (ref 3.5–5.3)
POTASSIUM SERPL-SCNC: 3.8 MMOL/L — SIGNIFICANT CHANGE UP (ref 3.5–5.3)
RBC # BLD: 4.33 M/UL — SIGNIFICANT CHANGE UP (ref 3.8–5.2)
RBC # FLD: 14.4 % — SIGNIFICANT CHANGE UP (ref 10.3–14.5)
SODIUM SERPL-SCNC: 143 MMOL/L — SIGNIFICANT CHANGE UP (ref 135–145)
WBC # BLD: 6.04 K/UL — SIGNIFICANT CHANGE UP (ref 3.8–10.5)
WBC # FLD AUTO: 6.04 K/UL — SIGNIFICANT CHANGE UP (ref 3.8–10.5)

## 2017-09-10 RX ADMIN — Medication 30 MILLIGRAM(S): at 06:45

## 2017-09-10 RX ADMIN — ENOXAPARIN SODIUM 40 MILLIGRAM(S): 100 INJECTION SUBCUTANEOUS at 11:24

## 2017-09-10 RX ADMIN — Medication 30 MILLIGRAM(S): at 06:12

## 2017-09-10 RX ADMIN — Medication 30 MILLIGRAM(S): at 13:52

## 2017-09-10 RX ADMIN — Medication 30 MILLIGRAM(S): at 20:15

## 2017-09-10 RX ADMIN — Medication 30 MILLIGRAM(S): at 00:20

## 2017-09-10 RX ADMIN — PANTOPRAZOLE SODIUM 40 MILLIGRAM(S): 20 TABLET, DELAYED RELEASE ORAL at 15:40

## 2017-09-10 RX ADMIN — Medication 30 MILLIGRAM(S): at 14:20

## 2017-09-10 RX ADMIN — PANTOPRAZOLE SODIUM 40 MILLIGRAM(S): 20 TABLET, DELAYED RELEASE ORAL at 06:13

## 2017-09-10 RX ADMIN — Medication 30 MILLIGRAM(S): at 20:33

## 2017-09-10 RX ADMIN — Medication 30 MILLIGRAM(S): at 00:30

## 2017-09-10 NOTE — PROGRESS NOTE ADULT - ASSESSMENT
66F s/p lap robotic-assisted partial gastrectomy    - Diet: LRD  - Monitor N/V  - OOB, ambulate, IS  - F/u GI function  - DVT prophylaxis    Seen and examined with Dr. Corley

## 2017-09-10 NOTE — PROGRESS NOTE ADULT - SUBJECTIVE AND OBJECTIVE BOX
DX:  s/p lap robotic assisted partial gastrectomy  POD#: 4    SUBJECTIVE    NAEO. Patient endorses some nausea with minimal emesis. Only tolerating minimal PO. Has been OOB. Pain well controlled.     10-point review of systems completed and negative except as noted above.    ketorolac   Injectable 30 milliGRAM(s) IV Push every 6 hours  enoxaparin Injectable 40 milliGRAM(s) SubCutaneous daily  oxyCODONE    IR 5 milliGRAM(s) Oral every 6 hours PRN  oxyCODONE    IR 10 milliGRAM(s) Oral every 6 hours PRN  pantoprazole    Tablet 40 milliGRAM(s) Oral two times a day before meals            OBJECTIVE    T(C): 36.6 (09-10-17 @ 12:24), Max: 36.9 (09-09-17 @ 16:57)  HR: 62 (09-10-17 @ 12:24) (59 - 83)  BP: 145/55 (09-10-17 @ 12:24) (135/96 - 153/71)  RR: 16 (09-10-17 @ 12:24) (16 - 18)  SpO2: 97% (09-10-17 @ 12:24) (97% - 100%)      EXAM  GEN: A&Ox3, NAD  HEENT: atraumatic, normocephalic  CV: no JVD  RESP: no increased work of breathing, no use of accessory muscles  GI/ABD: soft, minimally distended, incisions c/d/i x5  EXTREMITIES: warm, pink, well-perfused      LABS                        11.1   6.04  )-----------( 234      ( 10 Sep 2017 05:24 )             34.5     09-10    143  |  103  |  11  ----------------------------<  98  3.8   |  25  |  0.59    Ca    9.2      10 Sep 2017 05:24  Phos  4.1     09-10  Mg     1.8     09-10

## 2017-09-11 ENCOUNTER — TRANSCRIPTION ENCOUNTER (OUTPATIENT)
Age: 67
End: 2017-09-11

## 2017-09-11 VITALS
DIASTOLIC BLOOD PRESSURE: 57 MMHG | RESPIRATION RATE: 18 BRPM | HEART RATE: 62 BPM | OXYGEN SATURATION: 100 % | TEMPERATURE: 98 F | SYSTOLIC BLOOD PRESSURE: 142 MMHG

## 2017-09-11 LAB
BUN SERPL-MCNC: 15 MG/DL — SIGNIFICANT CHANGE UP (ref 7–23)
CALCIUM SERPL-MCNC: 9.1 MG/DL — SIGNIFICANT CHANGE UP (ref 8.4–10.5)
CHLORIDE SERPL-SCNC: 104 MMOL/L — SIGNIFICANT CHANGE UP (ref 98–107)
CO2 SERPL-SCNC: 25 MMOL/L — SIGNIFICANT CHANGE UP (ref 22–31)
CREAT SERPL-MCNC: 0.58 MG/DL — SIGNIFICANT CHANGE UP (ref 0.5–1.3)
GLUCOSE SERPL-MCNC: 90 MG/DL — SIGNIFICANT CHANGE UP (ref 70–99)
HCT VFR BLD CALC: 32.3 % — LOW (ref 34.5–45)
HGB BLD-MCNC: 10.3 G/DL — LOW (ref 11.5–15.5)
MAGNESIUM SERPL-MCNC: 1.8 MG/DL — SIGNIFICANT CHANGE UP (ref 1.6–2.6)
MCHC RBC-ENTMCNC: 25.3 PG — LOW (ref 27–34)
MCHC RBC-ENTMCNC: 31.9 % — LOW (ref 32–36)
MCV RBC AUTO: 79.4 FL — LOW (ref 80–100)
NRBC # FLD: 0 — SIGNIFICANT CHANGE UP
PHOSPHATE SERPL-MCNC: 4.1 MG/DL — SIGNIFICANT CHANGE UP (ref 2.5–4.5)
PLATELET # BLD AUTO: 241 K/UL — SIGNIFICANT CHANGE UP (ref 150–400)
PMV BLD: 11 FL — SIGNIFICANT CHANGE UP (ref 7–13)
POTASSIUM SERPL-MCNC: 3.7 MMOL/L — SIGNIFICANT CHANGE UP (ref 3.5–5.3)
POTASSIUM SERPL-SCNC: 3.7 MMOL/L — SIGNIFICANT CHANGE UP (ref 3.5–5.3)
RBC # BLD: 4.07 M/UL — SIGNIFICANT CHANGE UP (ref 3.8–5.2)
RBC # FLD: 14.5 % — SIGNIFICANT CHANGE UP (ref 10.3–14.5)
SODIUM SERPL-SCNC: 144 MMOL/L — SIGNIFICANT CHANGE UP (ref 135–145)
WBC # BLD: 5.67 K/UL — SIGNIFICANT CHANGE UP (ref 3.8–10.5)
WBC # FLD AUTO: 5.67 K/UL — SIGNIFICANT CHANGE UP (ref 3.8–10.5)

## 2017-09-11 PROCEDURE — 99024 POSTOP FOLLOW-UP VISIT: CPT

## 2017-09-11 RX ORDER — POTASSIUM CHLORIDE 20 MEQ
10 PACKET (EA) ORAL
Qty: 0 | Refills: 0 | Status: DISCONTINUED | OUTPATIENT
Start: 2017-09-11 | End: 2017-09-11

## 2017-09-11 RX ORDER — ENOXAPARIN SODIUM 100 MG/ML
40 INJECTION SUBCUTANEOUS
Qty: 40 | Refills: 0 | OUTPATIENT
Start: 2017-09-11 | End: 2017-09-21

## 2017-09-11 RX ORDER — ENOXAPARIN SODIUM 100 MG/ML
40 INJECTION SUBCUTANEOUS
Qty: 4 | Refills: 0
Start: 2017-09-11 | End: 2017-09-21

## 2017-09-11 RX ORDER — ENOXAPARIN SODIUM 100 MG/ML
40 INJECTION SUBCUTANEOUS
Qty: 4 | Refills: 0 | OUTPATIENT
Start: 2017-09-11 | End: 2017-09-21

## 2017-09-11 RX ORDER — PANTOPRAZOLE SODIUM 20 MG/1
1 TABLET, DELAYED RELEASE ORAL
Qty: 0 | Refills: 0 | DISCHARGE
Start: 2017-09-11

## 2017-09-11 RX ORDER — OXYCODONE HYDROCHLORIDE 5 MG/1
1 TABLET ORAL
Qty: 0 | Refills: 0 | DISCHARGE
Start: 2017-09-11

## 2017-09-11 RX ORDER — POTASSIUM CHLORIDE 20 MEQ
20 PACKET (EA) ORAL ONCE
Qty: 0 | Refills: 0 | Status: COMPLETED | OUTPATIENT
Start: 2017-09-11 | End: 2017-09-11

## 2017-09-11 RX ORDER — MAGNESIUM SULFATE 500 MG/ML
2 VIAL (ML) INJECTION ONCE
Qty: 0 | Refills: 0 | Status: COMPLETED | OUTPATIENT
Start: 2017-09-11 | End: 2017-09-11

## 2017-09-11 RX ADMIN — Medication 30 MILLIGRAM(S): at 13:18

## 2017-09-11 RX ADMIN — Medication 30 MILLIGRAM(S): at 03:30

## 2017-09-11 RX ADMIN — Medication 30 MILLIGRAM(S): at 09:20

## 2017-09-11 RX ADMIN — Medication 20 MILLIEQUIVALENT(S): at 14:28

## 2017-09-11 RX ADMIN — PANTOPRAZOLE SODIUM 40 MILLIGRAM(S): 20 TABLET, DELAYED RELEASE ORAL at 06:08

## 2017-09-11 RX ADMIN — Medication 100 MILLIEQUIVALENT(S): at 11:03

## 2017-09-11 RX ADMIN — Medication 100 MILLIEQUIVALENT(S): at 13:18

## 2017-09-11 RX ADMIN — Medication 30 MILLIGRAM(S): at 13:48

## 2017-09-11 RX ADMIN — Medication 30 MILLIGRAM(S): at 03:12

## 2017-09-11 RX ADMIN — ENOXAPARIN SODIUM 40 MILLIGRAM(S): 100 INJECTION SUBCUTANEOUS at 11:21

## 2017-09-11 RX ADMIN — OXYCODONE HYDROCHLORIDE 5 MILLIGRAM(S): 5 TABLET ORAL at 06:08

## 2017-09-11 RX ADMIN — OXYCODONE HYDROCHLORIDE 5 MILLIGRAM(S): 5 TABLET ORAL at 06:42

## 2017-09-11 RX ADMIN — Medication 30 MILLIGRAM(S): at 08:50

## 2017-09-11 RX ADMIN — Medication 50 GRAM(S): at 11:22

## 2017-09-11 NOTE — DISCHARGE NOTE ADULT - MEDICATION SUMMARY - MEDICATIONS TO TAKE
I will START or STAY ON the medications listed below when I get home from the hospital:    oxyCODONE 5 mg oral tablet  -- 1 tab(s) by mouth every 6 hours, As needed, Moderate Pain (4 - 6)  -- Indication: For pain prn     oxyCODONE 10 mg oral tablet  -- 1 tab(s) by mouth every 6 hours, As needed, Severe Pain (7 - 10)  -- Indication: For pain prn     lisinopril 20 mg oral tablet  -- 1 tab(s) by mouth once a day, am  -- Indication: For Home med     enoxaparin 40 mg/0.4 mL injectable solution  -- 40 milligram(s) injectable once a day MDD:40mg  -- It is very important that you take or use this exactly as directed.  Do not skip doses or discontinue unless directed by your doctor.    -- Indication: For Take as  directed     enoxaparin 40 mg/0.4 mL injectable solution  -- 40 milligram(s) injectable once a day   -- It is very important that you take or use this exactly as directed.  Do not skip doses or discontinue unless directed by your doctor.    -- Indication: For Take as directed     hydroxychloroquine 200 mg oral tablet  -- 1  by mouth 2 times a day  -- Indication: For Home med     amLODIPine 5 mg oral tablet  -- 1 tab(s) by mouth once a day, am  -- Indication: For Home med     Lasix 20 mg oral tablet  -- 1 tab(s) by mouth once a day,am  -- Indication: For Home med     pantoprazole 40 mg oral delayed release tablet  -- 1 tab(s) by mouth 2 times a day (before meals)  -- Indication: For Home med

## 2017-09-11 NOTE — DISCHARGE NOTE ADULT - PLAN OF CARE
s/p lap robotic assisted partial gastrectomy LRD as tolerated; continue Lovenox as directed for 2 weeks

## 2017-09-11 NOTE — DISCHARGE NOTE ADULT - PATIENT PORTAL LINK FT
“You can access the [employer] Patient Portal, offered by [facility], by registering with the following website: [email or web address]”

## 2017-09-11 NOTE — PROGRESS NOTE ADULT - ASSESSMENT
66F s/p lap robotic-assisted partial gastrectomy    - Diet: LRD  - Monitor N/V  - OOB, ambulate, IS  - F/u GI function  - DVT prophylaxis

## 2017-09-11 NOTE — DISCHARGE NOTE ADULT - HOSPITAL COURSE
s/p lap robotic assisted partial gastrectomy for a gastric mass. Pt tolerated; doing well. Pain controlled. Pt needs to continue Lovenox for 2 weeks.

## 2017-09-11 NOTE — DISCHARGE NOTE ADULT - CARE PROVIDER_API CALL
Jose Montejo (MD), Surgery  [address] [place] [address]  Phone: [phone]  Fax: [phone]

## 2017-09-11 NOTE — PROGRESS NOTE ADULT - SUBJECTIVE AND OBJECTIVE BOX
DX:  s/p lap robotic assisted partial gastrectomy  POD#: 5        SUBJECTIVE    NAEO. Patient denies N/V with LRD diet, but not able to eat full meals as she feels full easily. Endorses flatus, denies BM. Has been OOB. Voiding spontaneously. Pain well controlled.    10-point review of systems completed and negative except as noted above.    ketorolac   Injectable 30 milliGRAM(s) IV Push every 6 hours  enoxaparin Injectable 40 milliGRAM(s) SubCutaneous daily  oxyCODONE    IR 5 milliGRAM(s) Oral every 6 hours PRN  oxyCODONE    IR 10 milliGRAM(s) Oral every 6 hours PRN  pantoprazole    Tablet 40 milliGRAM(s) Oral two times a day before meals            OBJECTIVE    T(C): 36.6 (09-11-17 @ 01:06), Max: 36.9 (09-10-17 @ 17:59)  HR: 71 (09-11-17 @ 01:06) (59 - 72)  BP: 147/65 (09-11-17 @ 01:06) (135/96 - 149/71)  RR: 18 (09-11-17 @ 01:06) (16 - 18)  SpO2: 95% (09-11-17 @ 01:06) (95% - 100%)      EXAM  GEN: A&Ox3, NAD  HEENT: atraumatic, normocephalic  CV: no JVD  RESP: no increased work of breathing, no use of accessory muscles  GI/ABD: soft, minimally distended, incisions c/d/i x5  EXTREMITIES: warm, pink, well-perfused    LABS                        11.1   6.04  )-----------( 234      ( 10 Sep 2017 05:24 )             34.5     09-10    143  |  103  |  11  ----------------------------<  98  3.8   |  25  |  0.59    Ca    9.2      10 Sep 2017 05:24  Phos  4.1     09-10  Mg     1.8     09-10

## 2017-09-11 NOTE — DISCHARGE NOTE ADULT - CARE PLAN
Principal Discharge DX:	Malignant neoplasm of stomach, unspecified location  Goal:	s/p lap robotic assisted partial gastrectomy  Instructions for follow-up, activity and diet:	LRD as tolerated; continue Lovenox as directed for 2 weeks

## 2017-09-11 NOTE — DISCHARGE NOTE ADULT - INSTRUCTIONS
LRD Go to the emergency room if you experience any chest pain, shortness of breath and or palpitations. Follow up with you provider as instructed. Take all prescribed medication.

## 2017-09-14 LAB — SURGICAL PATHOLOGY STUDY: SIGNIFICANT CHANGE UP

## 2017-09-28 ENCOUNTER — APPOINTMENT (OUTPATIENT)
Dept: SURGICAL ONCOLOGY | Facility: CLINIC | Age: 67
End: 2017-09-28
Payer: MEDICARE

## 2017-09-28 VITALS
SYSTOLIC BLOOD PRESSURE: 126 MMHG | HEIGHT: 65 IN | HEART RATE: 62 BPM | WEIGHT: 166 LBS | DIASTOLIC BLOOD PRESSURE: 70 MMHG | BODY MASS INDEX: 27.66 KG/M2 | RESPIRATION RATE: 15 BRPM

## 2017-09-28 PROCEDURE — 99024 POSTOP FOLLOW-UP VISIT: CPT

## 2017-09-28 RX ORDER — HYDROXYCHLOROQUINE SULFATE 200 MG/1
200 TABLET ORAL
Refills: 0 | Status: ACTIVE | COMMUNITY

## 2017-10-28 ENCOUNTER — EMERGENCY (EMERGENCY)
Facility: HOSPITAL | Age: 67
LOS: 1 days | Discharge: HOME | End: 2017-10-28
Attending: EMERGENCY MEDICINE
Payer: MEDICARE

## 2017-10-28 VITALS
DIASTOLIC BLOOD PRESSURE: 61 MMHG | WEIGHT: 167.99 LBS | RESPIRATION RATE: 19 BRPM | TEMPERATURE: 97 F | HEIGHT: 65 IN | OXYGEN SATURATION: 100 % | HEART RATE: 68 BPM | SYSTOLIC BLOOD PRESSURE: 129 MMHG

## 2017-10-28 VITALS
HEART RATE: 72 BPM | RESPIRATION RATE: 17 BRPM | OXYGEN SATURATION: 100 % | DIASTOLIC BLOOD PRESSURE: 69 MMHG | TEMPERATURE: 98 F | SYSTOLIC BLOOD PRESSURE: 139 MMHG

## 2017-10-28 DIAGNOSIS — Z98.890 OTHER SPECIFIED POSTPROCEDURAL STATES: Chronic | ICD-10-CM

## 2017-10-28 LAB
ALBUMIN SERPL ELPH-MCNC: 3.4 G/DL — LOW (ref 3.5–5)
ALP SERPL-CCNC: 92 U/L — SIGNIFICANT CHANGE UP (ref 40–120)
ALT FLD-CCNC: 20 U/L DA — SIGNIFICANT CHANGE UP (ref 10–60)
ANION GAP SERPL CALC-SCNC: 9 MMOL/L — SIGNIFICANT CHANGE UP (ref 5–17)
APTT BLD: 28.6 SEC — SIGNIFICANT CHANGE UP (ref 27.5–37.4)
AST SERPL-CCNC: 14 U/L — SIGNIFICANT CHANGE UP (ref 10–40)
BASOPHILS # BLD AUTO: 0.1 K/UL — SIGNIFICANT CHANGE UP (ref 0–0.2)
BASOPHILS NFR BLD AUTO: 1 % — SIGNIFICANT CHANGE UP (ref 0–2)
BILIRUB SERPL-MCNC: 0.2 MG/DL — SIGNIFICANT CHANGE UP (ref 0.2–1.2)
BUN SERPL-MCNC: 22 MG/DL — HIGH (ref 7–18)
CALCIUM SERPL-MCNC: 8.8 MG/DL — SIGNIFICANT CHANGE UP (ref 8.4–10.5)
CHLORIDE SERPL-SCNC: 107 MMOL/L — SIGNIFICANT CHANGE UP (ref 96–108)
CO2 SERPL-SCNC: 24 MMOL/L — SIGNIFICANT CHANGE UP (ref 22–31)
CREAT SERPL-MCNC: 0.57 MG/DL — SIGNIFICANT CHANGE UP (ref 0.5–1.3)
EOSINOPHIL # BLD AUTO: 0.1 K/UL — SIGNIFICANT CHANGE UP (ref 0–0.5)
EOSINOPHIL NFR BLD AUTO: 0.9 % — SIGNIFICANT CHANGE UP (ref 0–6)
GLUCOSE SERPL-MCNC: 92 MG/DL — SIGNIFICANT CHANGE UP (ref 70–99)
HCT VFR BLD CALC: 35.4 % — SIGNIFICANT CHANGE UP (ref 34.5–45)
HCT VFR BLD CALC: 36.6 % — SIGNIFICANT CHANGE UP (ref 34.5–45)
HGB BLD-MCNC: 11.2 G/DL — LOW (ref 11.5–15.5)
HGB BLD-MCNC: 11.6 G/DL — SIGNIFICANT CHANGE UP (ref 11.5–15.5)
INR BLD: 0.92 RATIO — SIGNIFICANT CHANGE UP (ref 0.88–1.16)
LYMPHOCYTES # BLD AUTO: 1.8 K/UL — SIGNIFICANT CHANGE UP (ref 1–3.3)
LYMPHOCYTES # BLD AUTO: 28.1 % — SIGNIFICANT CHANGE UP (ref 13–44)
MCHC RBC-ENTMCNC: 25.7 PG — LOW (ref 27–34)
MCHC RBC-ENTMCNC: 25.9 PG — LOW (ref 27–34)
MCHC RBC-ENTMCNC: 31.5 GM/DL — LOW (ref 32–36)
MCHC RBC-ENTMCNC: 31.8 GM/DL — LOW (ref 32–36)
MCV RBC AUTO: 81.4 FL — SIGNIFICANT CHANGE UP (ref 80–100)
MCV RBC AUTO: 81.5 FL — SIGNIFICANT CHANGE UP (ref 80–100)
MONOCYTES # BLD AUTO: 0.6 K/UL — SIGNIFICANT CHANGE UP (ref 0–0.9)
MONOCYTES NFR BLD AUTO: 8.9 % — SIGNIFICANT CHANGE UP (ref 2–14)
NEUTROPHILS # BLD AUTO: 3.9 K/UL — SIGNIFICANT CHANGE UP (ref 1.8–7.4)
NEUTROPHILS NFR BLD AUTO: 61.1 % — SIGNIFICANT CHANGE UP (ref 43–77)
OB PNL STL: NEGATIVE — SIGNIFICANT CHANGE UP
PLATELET # BLD AUTO: 227 K/UL — SIGNIFICANT CHANGE UP (ref 150–400)
PLATELET # BLD AUTO: 252 K/UL — SIGNIFICANT CHANGE UP (ref 150–400)
POTASSIUM SERPL-MCNC: 3.8 MMOL/L — SIGNIFICANT CHANGE UP (ref 3.5–5.3)
POTASSIUM SERPL-SCNC: 3.8 MMOL/L — SIGNIFICANT CHANGE UP (ref 3.5–5.3)
PROT SERPL-MCNC: 6.7 G/DL — SIGNIFICANT CHANGE UP (ref 6–8.3)
PROTHROM AB SERPL-ACNC: 10 SEC — SIGNIFICANT CHANGE UP (ref 9.8–12.7)
RBC # BLD: 4.35 M/UL — SIGNIFICANT CHANGE UP (ref 3.8–5.2)
RBC # BLD: 4.5 M/UL — SIGNIFICANT CHANGE UP (ref 3.8–5.2)
RBC # FLD: 13.4 % — SIGNIFICANT CHANGE UP (ref 10.3–14.5)
RBC # FLD: 13.5 % — SIGNIFICANT CHANGE UP (ref 10.3–14.5)
SODIUM SERPL-SCNC: 140 MMOL/L — SIGNIFICANT CHANGE UP (ref 135–145)
WBC # BLD: 6.2 K/UL — SIGNIFICANT CHANGE UP (ref 3.8–10.5)
WBC # BLD: 6.3 K/UL — SIGNIFICANT CHANGE UP (ref 3.8–10.5)
WBC # FLD AUTO: 6.2 K/UL — SIGNIFICANT CHANGE UP (ref 3.8–10.5)
WBC # FLD AUTO: 6.3 K/UL — SIGNIFICANT CHANGE UP (ref 3.8–10.5)

## 2017-10-28 PROCEDURE — 74177 CT ABD & PELVIS W/CONTRAST: CPT | Mod: 26

## 2017-10-28 PROCEDURE — 82272 OCCULT BLD FECES 1-3 TESTS: CPT

## 2017-10-28 PROCEDURE — 99284 EMERGENCY DEPT VISIT MOD MDM: CPT | Mod: 25

## 2017-10-28 PROCEDURE — 85730 THROMBOPLASTIN TIME PARTIAL: CPT

## 2017-10-28 PROCEDURE — 86901 BLOOD TYPING SEROLOGIC RH(D): CPT

## 2017-10-28 PROCEDURE — 73562 X-RAY EXAM OF KNEE 3: CPT

## 2017-10-28 PROCEDURE — 85610 PROTHROMBIN TIME: CPT

## 2017-10-28 PROCEDURE — 73562 X-RAY EXAM OF KNEE 3: CPT | Mod: 26,RT

## 2017-10-28 PROCEDURE — 86900 BLOOD TYPING SEROLOGIC ABO: CPT

## 2017-10-28 PROCEDURE — 86850 RBC ANTIBODY SCREEN: CPT

## 2017-10-28 PROCEDURE — 36415 COLL VENOUS BLD VENIPUNCTURE: CPT

## 2017-10-28 PROCEDURE — 74177 CT ABD & PELVIS W/CONTRAST: CPT

## 2017-10-28 PROCEDURE — 36000 PLACE NEEDLE IN VEIN: CPT

## 2017-10-28 PROCEDURE — 85027 COMPLETE CBC AUTOMATED: CPT

## 2017-10-28 PROCEDURE — 80053 COMPREHEN METABOLIC PANEL: CPT

## 2017-10-28 RX ORDER — SODIUM CHLORIDE 9 MG/ML
1000 INJECTION INTRAMUSCULAR; INTRAVENOUS; SUBCUTANEOUS ONCE
Qty: 0 | Refills: 0 | Status: COMPLETED | OUTPATIENT
Start: 2017-10-28 | End: 2017-10-28

## 2017-10-28 RX ADMIN — SODIUM CHLORIDE 1000 MILLILITER(S): 9 INJECTION INTRAMUSCULAR; INTRAVENOUS; SUBCUTANEOUS at 03:52

## 2017-10-28 NOTE — ED PROVIDER NOTE - OBJECTIVE STATEMENT
66 y/o F pt with PMHx of anemia, HTN, RA, stomach CA, and thyroid nodule presents to ED c/o abd pain, R flank pain, blood in stool, and R knee pain x 2 days. Pt reports that she recently had surgery at [facility]  to remove a stomach mass. Pt saw oncologist today for evaluation of symptoms and was instructed to f/u with ED. Pt denies fever, chills, or any other complaints. NKDA. 68 y/o F pt with PMHx of anemia, HTN, RA, stomach CA, and thyroid nodule presents to ED c/o abd pain, R flank pain, blood in stool, and R knee pain x 2 days. Pt reports that she recently had surgery at [facility]  to remove a stomach mass. Pt saw oncologist today for evaluation of symptoms and was instructed to f/u with ED. Patient also complaining of chronic R knee swelling, no difficulty in ambulation.  Pt denies fever, chills, or any other complaints. NKDA.

## 2017-10-28 NOTE — ED PROVIDER NOTE - MEDICAL DECISION MAKING DETAILS
Pt s/p recent removal of stomach mass. Will evaluate for surgical complications, obtain CT r/o reoccurrence of mass, and reassess.

## 2017-10-28 NOTE — ED PROVIDER NOTE - PROGRESS NOTE DETAILS
no active bleeding, occult negative, patient only reports 2 episodes of bleeding but none at present.  copies of results given, repeat CBC stable.

## 2017-10-30 DIAGNOSIS — K62.5 HEMORRHAGE OF ANUS AND RECTUM: ICD-10-CM

## 2017-10-31 ENCOUNTER — APPOINTMENT (OUTPATIENT)
Dept: SURGERY | Facility: CLINIC | Age: 67
End: 2017-10-31

## 2017-11-01 DIAGNOSIS — K57.90 DIVERTICULOSIS OF INTESTINE, PART UNSPECIFIED, WITHOUT PERFORATION OR ABSCESS WITHOUT BLEEDING: ICD-10-CM

## 2017-11-01 DIAGNOSIS — M25.561 PAIN IN RIGHT KNEE: ICD-10-CM

## 2017-11-01 DIAGNOSIS — I10 ESSENTIAL (PRIMARY) HYPERTENSION: ICD-10-CM

## 2017-11-01 DIAGNOSIS — Z85.020 PERSONAL HISTORY OF MALIGNANT CARCINOID TUMOR OF STOMACH: ICD-10-CM

## 2017-11-02 RX ORDER — POLYETHYLENE GLYCOL 3350, SODIUM SULFATE, SODIUM CHLORIDE, POTASSIUM CHLORIDE, ASCORBIC ACID, SODIUM ASCORBATE 7.5-2.691G
100 KIT ORAL
Qty: 1 | Refills: 0 | Status: ACTIVE | COMMUNITY
Start: 2017-11-02 | End: 1900-01-01

## 2017-11-07 ENCOUNTER — APPOINTMENT (OUTPATIENT)
Dept: GASTROENTEROLOGY | Facility: HOSPITAL | Age: 67
End: 2017-11-07

## 2017-12-05 ENCOUNTER — RESULT REVIEW (OUTPATIENT)
Age: 67
End: 2017-12-05

## 2017-12-05 ENCOUNTER — OUTPATIENT (OUTPATIENT)
Dept: OUTPATIENT SERVICES | Facility: HOSPITAL | Age: 67
LOS: 1 days | Discharge: HOME | End: 2017-12-05
Payer: MEDICARE

## 2017-12-05 ENCOUNTER — APPOINTMENT (OUTPATIENT)
Dept: GASTROENTEROLOGY | Facility: HOSPITAL | Age: 67
End: 2017-12-05

## 2017-12-05 DIAGNOSIS — Z98.890 OTHER SPECIFIED POSTPROCEDURAL STATES: Chronic | ICD-10-CM

## 2017-12-05 DIAGNOSIS — C49.9 MALIGNANT NEOPLASM OF CONNECTIVE AND SOFT TISSUE, UNSPECIFIED: ICD-10-CM

## 2017-12-05 PROCEDURE — 43239 EGD BIOPSY SINGLE/MULTIPLE: CPT | Mod: GC

## 2017-12-05 PROCEDURE — 88305 TISSUE EXAM BY PATHOLOGIST: CPT | Mod: 26

## 2017-12-05 PROCEDURE — 45385 COLONOSCOPY W/LESION REMOVAL: CPT | Mod: GC

## 2017-12-13 ENCOUNTER — CHART COPY (OUTPATIENT)
Age: 67
End: 2017-12-13

## 2017-12-28 ENCOUNTER — APPOINTMENT (OUTPATIENT)
Dept: SURGERY | Facility: CLINIC | Age: 67
End: 2017-12-28
Payer: MEDICARE

## 2017-12-28 ENCOUNTER — LABORATORY RESULT (OUTPATIENT)
Age: 67
End: 2017-12-28

## 2017-12-28 VITALS
DIASTOLIC BLOOD PRESSURE: 72 MMHG | BODY MASS INDEX: 26.66 KG/M2 | HEART RATE: 77 BPM | HEIGHT: 65 IN | SYSTOLIC BLOOD PRESSURE: 125 MMHG | WEIGHT: 160 LBS

## 2017-12-28 DIAGNOSIS — Z87.39 PERSONAL HISTORY OF OTHER DISEASES OF THE MUSCULOSKELETAL SYSTEM AND CONNECTIVE TISSUE: ICD-10-CM

## 2017-12-28 DIAGNOSIS — E04.1 NONTOXIC SINGLE THYROID NODULE: ICD-10-CM

## 2017-12-28 PROCEDURE — 76942 ECHO GUIDE FOR BIOPSY: CPT

## 2017-12-28 PROCEDURE — 99203 OFFICE O/P NEW LOW 30 MIN: CPT

## 2017-12-28 PROCEDURE — 36415 COLL VENOUS BLD VENIPUNCTURE: CPT

## 2017-12-28 PROCEDURE — 10022: CPT

## 2017-12-29 LAB
T3 SERPL-MCNC: 107 NG/DL
T4 FREE SERPL-MCNC: 1.7 NG/DL
TSH SERPL-ACNC: 0.7 UIU/ML

## 2017-12-30 LAB
THYROGLOB AB SERPL-ACNC: <20 IU/ML
THYROPEROXIDASE AB SERPL IA-ACNC: <10 IU/ML

## 2017-12-31 PROBLEM — E04.1 THYROID NODULE: Status: ACTIVE | Noted: 2017-12-28

## 2018-01-02 ENCOUNTER — OTHER (OUTPATIENT)
Age: 68
End: 2018-01-02

## 2018-04-02 ENCOUNTER — RX RENEWAL (OUTPATIENT)
Age: 68
End: 2018-04-02

## 2018-04-02 RX ORDER — OMEPRAZOLE 40 MG/1
40 CAPSULE, DELAYED RELEASE ORAL TWICE DAILY
Qty: 60 | Refills: 5 | Status: ACTIVE | COMMUNITY
Start: 2017-07-14 | End: 1900-01-01

## 2018-07-26 PROBLEM — C16.9 MALIGNANT NEOPLASM OF STOMACH, UNSPECIFIED: Chronic | Status: ACTIVE | Noted: 2017-08-24

## 2018-07-26 PROBLEM — E04.1 NONTOXIC SINGLE THYROID NODULE: Chronic | Status: ACTIVE | Noted: 2017-08-24

## 2018-07-26 PROBLEM — E66.9 OBESITY, UNSPECIFIED: Chronic | Status: ACTIVE | Noted: 2017-08-24

## 2018-07-26 PROBLEM — D64.9 ANEMIA, UNSPECIFIED: Chronic | Status: ACTIVE | Noted: 2017-10-28

## 2018-07-26 PROBLEM — M06.9 RHEUMATOID ARTHRITIS, UNSPECIFIED: Chronic | Status: ACTIVE | Noted: 2017-08-24

## 2018-08-07 ENCOUNTER — RESULT REVIEW (OUTPATIENT)
Age: 68
End: 2018-08-07

## 2018-09-04 ENCOUNTER — APPOINTMENT (OUTPATIENT)
Dept: INTERVENTIONAL RADIOLOGY/VASCULAR | Facility: CLINIC | Age: 68
End: 2018-09-04

## 2018-09-04 ENCOUNTER — APPOINTMENT (OUTPATIENT)
Dept: GASTROENTEROLOGY | Facility: CLINIC | Age: 68
End: 2018-09-04

## 2018-09-19 NOTE — ED ADULT NURSE NOTE - PAIN RATING/NUMBER SCALE (0-10): ACTIVITY
0 Scribe Attestation (For Scribes USE Only)... Attending Attestation (For Attendings USE Only).../Scribe Attestation (For Scribes USE Only)...

## 2018-10-02 ENCOUNTER — APPOINTMENT (OUTPATIENT)
Dept: INTERVENTIONAL RADIOLOGY/VASCULAR | Facility: CLINIC | Age: 68
End: 2018-10-02

## 2018-10-16 ENCOUNTER — APPOINTMENT (OUTPATIENT)
Dept: INTERVENTIONAL RADIOLOGY/VASCULAR | Facility: CLINIC | Age: 68
End: 2018-10-16

## 2018-10-16 ENCOUNTER — APPOINTMENT (OUTPATIENT)
Dept: INTERVENTIONAL RADIOLOGY/VASCULAR | Facility: CLINIC | Age: 68
End: 2018-10-16
Payer: MEDICARE

## 2018-10-16 VITALS
RESPIRATION RATE: 16 BRPM | DIASTOLIC BLOOD PRESSURE: 70 MMHG | OXYGEN SATURATION: 98 % | WEIGHT: 172 LBS | BODY MASS INDEX: 28.66 KG/M2 | HEIGHT: 65 IN | HEART RATE: 69 BPM | SYSTOLIC BLOOD PRESSURE: 139 MMHG

## 2018-10-16 DIAGNOSIS — K76.9 LIVER DISEASE, UNSPECIFIED: ICD-10-CM

## 2018-10-16 DIAGNOSIS — S32.599A OTHER SPECIFIED FRACTURE OF UNSPECIFIED PUBIS, INITIAL ENCOUNTER FOR CLOSED FRACTURE: ICD-10-CM

## 2018-10-16 PROCEDURE — 99204 OFFICE O/P NEW MOD 45 MIN: CPT

## 2018-10-24 ENCOUNTER — OUTPATIENT (OUTPATIENT)
Dept: OUTPATIENT SERVICES | Facility: HOSPITAL | Age: 68
LOS: 1 days | End: 2018-10-24
Payer: COMMERCIAL

## 2018-10-24 ENCOUNTER — RESULT REVIEW (OUTPATIENT)
Age: 68
End: 2018-10-24

## 2018-10-24 DIAGNOSIS — Z98.890 OTHER SPECIFIED POSTPROCEDURAL STATES: Chronic | ICD-10-CM

## 2018-10-24 DIAGNOSIS — R16.0 HEPATOMEGALY, NOT ELSEWHERE CLASSIFIED: ICD-10-CM

## 2018-10-24 PROCEDURE — 88341 IMHCHEM/IMCYTCHM EA ADD ANTB: CPT | Mod: 26,59

## 2018-10-24 PROCEDURE — 88307 TISSUE EXAM BY PATHOLOGIST: CPT | Mod: 26

## 2018-10-24 PROCEDURE — 88360 TUMOR IMMUNOHISTOCHEM/MANUAL: CPT | Mod: 26

## 2018-10-24 PROCEDURE — 88342 IMHCHEM/IMCYTCHM 1ST ANTB: CPT | Mod: 26,59

## 2018-10-24 PROCEDURE — 88173 CYTOPATH EVAL FNA REPORT: CPT | Mod: 26

## 2018-10-24 PROCEDURE — 47000 NEEDLE BIOPSY OF LIVER PERQ: CPT

## 2018-10-24 PROCEDURE — 88305 TISSUE EXAM BY PATHOLOGIST: CPT | Mod: 26

## 2018-10-24 PROCEDURE — 77012 CT SCAN FOR NEEDLE BIOPSY: CPT | Mod: 26

## 2018-10-30 DIAGNOSIS — R16.0 HEPATOMEGALY, NOT ELSEWHERE CLASSIFIED: ICD-10-CM

## 2019-05-24 ENCOUNTER — EMERGENCY (EMERGENCY)
Facility: HOSPITAL | Age: 69
LOS: 1 days | Discharge: LEFT BEFORE DISCHARGE | End: 2019-05-24
Attending: EMERGENCY MEDICINE
Payer: COMMERCIAL

## 2019-05-24 VITALS
DIASTOLIC BLOOD PRESSURE: 64 MMHG | OXYGEN SATURATION: 100 % | RESPIRATION RATE: 16 BRPM | WEIGHT: 169.98 LBS | HEART RATE: 65 BPM | HEIGHT: 65 IN | TEMPERATURE: 98 F | SYSTOLIC BLOOD PRESSURE: 104 MMHG

## 2019-05-24 DIAGNOSIS — Z98.890 OTHER SPECIFIED POSTPROCEDURAL STATES: Chronic | ICD-10-CM

## 2019-05-24 LAB
ALBUMIN SERPL ELPH-MCNC: 3.2 G/DL — LOW (ref 3.5–5)
ALP SERPL-CCNC: 53 U/L — SIGNIFICANT CHANGE UP (ref 40–120)
ALT FLD-CCNC: 20 U/L DA — SIGNIFICANT CHANGE UP (ref 10–60)
ANION GAP SERPL CALC-SCNC: 7 MMOL/L — SIGNIFICANT CHANGE UP (ref 5–17)
APTT BLD: 30.1 SEC — SIGNIFICANT CHANGE UP (ref 27.5–36.3)
AST SERPL-CCNC: 23 U/L — SIGNIFICANT CHANGE UP (ref 10–40)
BASOPHILS # BLD AUTO: 0.01 K/UL — SIGNIFICANT CHANGE UP (ref 0–0.2)
BASOPHILS NFR BLD AUTO: 0.2 % — SIGNIFICANT CHANGE UP (ref 0–2)
BILIRUB SERPL-MCNC: 0.4 MG/DL — SIGNIFICANT CHANGE UP (ref 0.2–1.2)
BUN SERPL-MCNC: 18 MG/DL — SIGNIFICANT CHANGE UP (ref 7–18)
CALCIUM SERPL-MCNC: 7.9 MG/DL — LOW (ref 8.4–10.5)
CHLORIDE SERPL-SCNC: 107 MMOL/L — SIGNIFICANT CHANGE UP (ref 96–108)
CO2 SERPL-SCNC: 26 MMOL/L — SIGNIFICANT CHANGE UP (ref 22–31)
CREAT SERPL-MCNC: 0.7 MG/DL — SIGNIFICANT CHANGE UP (ref 0.5–1.3)
EOSINOPHIL # BLD AUTO: 0.06 K/UL — SIGNIFICANT CHANGE UP (ref 0–0.5)
EOSINOPHIL NFR BLD AUTO: 1.4 % — SIGNIFICANT CHANGE UP (ref 0–6)
GLUCOSE SERPL-MCNC: 90 MG/DL — SIGNIFICANT CHANGE UP (ref 70–99)
HCT VFR BLD CALC: 33.9 % — LOW (ref 34.5–45)
HGB BLD-MCNC: 10.4 G/DL — LOW (ref 11.5–15.5)
IMM GRANULOCYTES NFR BLD AUTO: 0.2 % — SIGNIFICANT CHANGE UP (ref 0–1.5)
INR BLD: 1.01 RATIO — SIGNIFICANT CHANGE UP (ref 0.88–1.16)
LIDOCAIN IGE QN: 108 U/L — SIGNIFICANT CHANGE UP (ref 73–393)
LYMPHOCYTES # BLD AUTO: 1.63 K/UL — SIGNIFICANT CHANGE UP (ref 1–3.3)
LYMPHOCYTES # BLD AUTO: 37 % — SIGNIFICANT CHANGE UP (ref 13–44)
MCHC RBC-ENTMCNC: 25.6 PG — LOW (ref 27–34)
MCHC RBC-ENTMCNC: 30.7 GM/DL — LOW (ref 32–36)
MCV RBC AUTO: 83.3 FL — SIGNIFICANT CHANGE UP (ref 80–100)
MONOCYTES # BLD AUTO: 0.36 K/UL — SIGNIFICANT CHANGE UP (ref 0–0.9)
MONOCYTES NFR BLD AUTO: 8.2 % — SIGNIFICANT CHANGE UP (ref 2–14)
NEUTROPHILS # BLD AUTO: 2.34 K/UL — SIGNIFICANT CHANGE UP (ref 1.8–7.4)
NEUTROPHILS NFR BLD AUTO: 53 % — SIGNIFICANT CHANGE UP (ref 43–77)
NRBC # BLD: 0 /100 WBCS — SIGNIFICANT CHANGE UP (ref 0–0)
NT-PROBNP SERPL-SCNC: 84 PG/ML — SIGNIFICANT CHANGE UP (ref 0–125)
PLATELET # BLD AUTO: 173 K/UL — SIGNIFICANT CHANGE UP (ref 150–400)
POTASSIUM SERPL-MCNC: 3.9 MMOL/L — SIGNIFICANT CHANGE UP (ref 3.5–5.3)
POTASSIUM SERPL-SCNC: 3.9 MMOL/L — SIGNIFICANT CHANGE UP (ref 3.5–5.3)
PROT SERPL-MCNC: 6.1 G/DL — SIGNIFICANT CHANGE UP (ref 6–8.3)
PROTHROM AB SERPL-ACNC: 11.2 SEC — SIGNIFICANT CHANGE UP (ref 10–12.9)
RBC # BLD: 4.07 M/UL — SIGNIFICANT CHANGE UP (ref 3.8–5.2)
RBC # FLD: 14.2 % — SIGNIFICANT CHANGE UP (ref 10.3–14.5)
SODIUM SERPL-SCNC: 140 MMOL/L — SIGNIFICANT CHANGE UP (ref 135–145)
TROPONIN I SERPL-MCNC: <0.015 NG/ML — SIGNIFICANT CHANGE UP (ref 0–0.04)
WBC # BLD: 4.41 K/UL — SIGNIFICANT CHANGE UP (ref 3.8–10.5)
WBC # FLD AUTO: 4.41 K/UL — SIGNIFICANT CHANGE UP (ref 3.8–10.5)

## 2019-05-24 PROCEDURE — 93971 EXTREMITY STUDY: CPT | Mod: 26,RT

## 2019-05-24 PROCEDURE — 99285 EMERGENCY DEPT VISIT HI MDM: CPT

## 2019-05-24 RX ORDER — SODIUM CHLORIDE 9 MG/ML
1000 INJECTION INTRAMUSCULAR; INTRAVENOUS; SUBCUTANEOUS ONCE
Refills: 0 | Status: COMPLETED | OUTPATIENT
Start: 2019-05-24 | End: 2019-05-24

## 2019-05-24 NOTE — ED PROVIDER NOTE - PROGRESS NOTE DETAILS
I informed this patient of the need for further medical evaluation and care given the patient's current medical condition.   This patient declined further medical evaluation and treatment.  I informed this patient of the benefits of further medical evaluation and care at this facility.  I informed this patient of the risks of leaving against our medical advice without properly completing our evaluation today that include illness, injury, permenant disability and even death.  The patient was also informed about alternatives.  I informed the patient of the possible necessity for hospital admission depending on future findings.   At the time of discussion this patient maintained full faculties of judgement and medical decision making capacity.    In accordance with this patient's wishes the patient was discharged from the emergency department against our medical advice in stable condition with normal vital signs in no acute distress. At the time of discharge this patient demonstrated full faculties medical capacity and judgement.  In my conversation with this patient they demonstrated the followin. This patient demonstrated their ability to express and communicate their choice to leave the emergency department against medical advice and to refuse further medical care.  2. This patient demonstrated the ability to understand relevant information regarding their diagnosis including the purpose of recommended treatment for their stated medical condition.  The paitent remembered this information and demonstrated that they were part of the decision making process in the cousre of their care.  3. This patient appreciated the significance of their medical condition, their diagnosis, and the consequences for leaving the emergency department against medical advice and refusing further medical treatment.  This patient demonstrated clear understanding of the benefits of further evaluation and treatment.  This patient demonstrated clear understanding of the risks of leaving against medical advice and refusing further medical treatment that include injury, illness, permenant disability, and death.   4.  This patient demonstrated the ability to manipulate inforamtion regarding their medical condition, their decision to leave the emergency department against medical advice, and their decision to refuse further medical care.  The patient demonstrated appropriate reasoning and intact logical thought processes during our conversation and was able to weigh the risks and benefits of receiving further medical care. patient does not want to wait for CT scan, will AMA.

## 2019-05-24 NOTE — ED PROVIDER NOTE - OBJECTIVE STATEMENT
68 F s/p GIST tumor removal complaining of shortness of breath over past few weeks, R sided abdominal pain.  No fever/chills.  No chest pain.  Patient states legs have been swelling as well.  No other complaints.

## 2019-05-24 NOTE — ED ADULT NURSE NOTE - NSIMPLEMENTINTERV_GEN_ALL_ED
Implemented All Universal Safety Interventions:  [place] to call system. Call bell, personal items and telephone within reach. Instruct patient to call for assistance. Room bathroom lighting operational. Non-slip footwear when patient is off stretcher. Physically safe environment: no spills, clutter or unnecessary equipment. Stretcher in lowest position, wheels locked, appropriate side rails in place.

## 2019-05-25 PROCEDURE — 85730 THROMBOPLASTIN TIME PARTIAL: CPT

## 2019-05-25 PROCEDURE — 84484 ASSAY OF TROPONIN QUANT: CPT

## 2019-05-25 PROCEDURE — 85027 COMPLETE CBC AUTOMATED: CPT

## 2019-05-25 PROCEDURE — 83690 ASSAY OF LIPASE: CPT

## 2019-05-25 PROCEDURE — 85610 PROTHROMBIN TIME: CPT

## 2019-05-25 PROCEDURE — 36415 COLL VENOUS BLD VENIPUNCTURE: CPT

## 2019-05-25 PROCEDURE — 93971 EXTREMITY STUDY: CPT

## 2019-05-25 PROCEDURE — 80053 COMPREHEN METABOLIC PANEL: CPT

## 2019-05-25 PROCEDURE — 99284 EMERGENCY DEPT VISIT MOD MDM: CPT | Mod: 25

## 2019-05-25 PROCEDURE — 83880 ASSAY OF NATRIURETIC PEPTIDE: CPT

## 2019-05-25 RX ADMIN — SODIUM CHLORIDE 1000 MILLILITER(S): 9 INJECTION INTRAMUSCULAR; INTRAVENOUS; SUBCUTANEOUS at 06:48

## 2019-05-29 ENCOUNTER — EMERGENCY (EMERGENCY)
Facility: HOSPITAL | Age: 69
LOS: 1 days | Discharge: HOME | End: 2019-05-29
Attending: STUDENT IN AN ORGANIZED HEALTH CARE EDUCATION/TRAINING PROGRAM | Admitting: EMERGENCY MEDICINE
Payer: MEDICARE

## 2019-05-29 VITALS
RESPIRATION RATE: 18 BRPM | DIASTOLIC BLOOD PRESSURE: 55 MMHG | HEART RATE: 64 BPM | OXYGEN SATURATION: 100 % | SYSTOLIC BLOOD PRESSURE: 155 MMHG | TEMPERATURE: 99 F

## 2019-05-29 DIAGNOSIS — Z98.890 OTHER SPECIFIED POSTPROCEDURAL STATES: Chronic | ICD-10-CM

## 2019-05-29 LAB
ALBUMIN SERPL ELPH-MCNC: 4.3 G/DL — SIGNIFICANT CHANGE UP (ref 3.3–5)
ALP SERPL-CCNC: 47 U/L — SIGNIFICANT CHANGE UP (ref 40–120)
ALT FLD-CCNC: 15 U/L — SIGNIFICANT CHANGE UP (ref 4–33)
ANION GAP SERPL CALC-SCNC: 15 MMO/L — HIGH (ref 7–14)
APPEARANCE UR: CLEAR — SIGNIFICANT CHANGE UP
APTT BLD: 28.6 SEC — SIGNIFICANT CHANGE UP (ref 27.5–36.3)
AST SERPL-CCNC: 24 U/L — SIGNIFICANT CHANGE UP (ref 4–32)
BACTERIA # UR AUTO: SIGNIFICANT CHANGE UP
BASE EXCESS BLDV CALC-SCNC: -1.7 MMOL/L — SIGNIFICANT CHANGE UP
BASOPHILS # BLD AUTO: 0.01 K/UL — SIGNIFICANT CHANGE UP (ref 0–0.2)
BASOPHILS NFR BLD AUTO: 0.2 % — SIGNIFICANT CHANGE UP (ref 0–2)
BILIRUB SERPL-MCNC: 0.6 MG/DL — SIGNIFICANT CHANGE UP (ref 0.2–1.2)
BILIRUB UR-MCNC: NEGATIVE — SIGNIFICANT CHANGE UP
BLD GP AB SCN SERPL QL: NEGATIVE — SIGNIFICANT CHANGE UP
BLOOD GAS VENOUS - CREATININE: 0.56 MG/DL — SIGNIFICANT CHANGE UP (ref 0.5–1.3)
BLOOD UR QL VISUAL: SIGNIFICANT CHANGE UP
BUN SERPL-MCNC: 9 MG/DL — SIGNIFICANT CHANGE UP (ref 7–23)
CALCIUM SERPL-MCNC: 8.6 MG/DL — SIGNIFICANT CHANGE UP (ref 8.4–10.5)
CHLORIDE BLDV-SCNC: 106 MMOL/L — SIGNIFICANT CHANGE UP (ref 96–108)
CHLORIDE SERPL-SCNC: 103 MMOL/L — SIGNIFICANT CHANGE UP (ref 98–107)
CO2 SERPL-SCNC: 18 MMOL/L — LOW (ref 22–31)
COLOR SPEC: YELLOW — SIGNIFICANT CHANGE UP
CREAT SERPL-MCNC: 0.55 MG/DL — SIGNIFICANT CHANGE UP (ref 0.5–1.3)
EOSINOPHIL # BLD AUTO: 0 K/UL — SIGNIFICANT CHANGE UP (ref 0–0.5)
EOSINOPHIL NFR BLD AUTO: 0 % — SIGNIFICANT CHANGE UP (ref 0–6)
GAS PNL BLDV: 135 MMOL/L — LOW (ref 136–146)
GLUCOSE BLDV-MCNC: 98 MG/DL — SIGNIFICANT CHANGE UP (ref 70–99)
GLUCOSE SERPL-MCNC: 105 MG/DL — HIGH (ref 70–99)
GLUCOSE UR-MCNC: NEGATIVE — SIGNIFICANT CHANGE UP
HCO3 BLDV-SCNC: 22 MMOL/L — SIGNIFICANT CHANGE UP (ref 20–27)
HCT VFR BLD CALC: 38.8 % — SIGNIFICANT CHANGE UP (ref 34.5–45)
HCT VFR BLDV CALC: 36 % — SIGNIFICANT CHANGE UP (ref 34.5–45)
HGB BLD-MCNC: 12 G/DL — SIGNIFICANT CHANGE UP (ref 11.5–15.5)
HGB BLDV-MCNC: 11.7 G/DL — SIGNIFICANT CHANGE UP (ref 11.5–15.5)
HYALINE CASTS # UR AUTO: NEGATIVE — SIGNIFICANT CHANGE UP
IMM GRANULOCYTES NFR BLD AUTO: 0.2 % — SIGNIFICANT CHANGE UP (ref 0–1.5)
INR BLD: 1.07 — SIGNIFICANT CHANGE UP (ref 0.88–1.17)
KETONES UR-MCNC: SIGNIFICANT CHANGE UP
LACTATE BLDV-MCNC: 1.5 MMOL/L — SIGNIFICANT CHANGE UP (ref 0.5–2)
LEUKOCYTE ESTERASE UR-ACNC: SIGNIFICANT CHANGE UP
LYMPHOCYTES # BLD AUTO: 1.03 K/UL — SIGNIFICANT CHANGE UP (ref 1–3.3)
LYMPHOCYTES # BLD AUTO: 21.4 % — SIGNIFICANT CHANGE UP (ref 13–44)
MCHC RBC-ENTMCNC: 25.3 PG — LOW (ref 27–34)
MCHC RBC-ENTMCNC: 30.9 % — LOW (ref 32–36)
MCV RBC AUTO: 81.9 FL — SIGNIFICANT CHANGE UP (ref 80–100)
MONOCYTES # BLD AUTO: 0.36 K/UL — SIGNIFICANT CHANGE UP (ref 0–0.9)
MONOCYTES NFR BLD AUTO: 7.5 % — SIGNIFICANT CHANGE UP (ref 2–14)
NEUTROPHILS # BLD AUTO: 3.4 K/UL — SIGNIFICANT CHANGE UP (ref 1.8–7.4)
NEUTROPHILS NFR BLD AUTO: 70.7 % — SIGNIFICANT CHANGE UP (ref 43–77)
NITRITE UR-MCNC: NEGATIVE — SIGNIFICANT CHANGE UP
NRBC # FLD: 0 K/UL — SIGNIFICANT CHANGE UP (ref 0–0)
PCO2 BLDV: 46 MMHG — SIGNIFICANT CHANGE UP (ref 41–51)
PH BLDV: 7.33 PH — SIGNIFICANT CHANGE UP (ref 7.32–7.43)
PH UR: 7 — SIGNIFICANT CHANGE UP (ref 5–8)
PLATELET # BLD AUTO: 199 K/UL — SIGNIFICANT CHANGE UP (ref 150–400)
PMV BLD: 11.1 FL — SIGNIFICANT CHANGE UP (ref 7–13)
PO2 BLDV: 30 MMHG — LOW (ref 35–40)
POTASSIUM BLDV-SCNC: 6.4 MMOL/L — CRITICAL HIGH (ref 3.4–4.5)
POTASSIUM SERPL-MCNC: 4.2 MMOL/L — SIGNIFICANT CHANGE UP (ref 3.5–5.3)
POTASSIUM SERPL-SCNC: 4.2 MMOL/L — SIGNIFICANT CHANGE UP (ref 3.5–5.3)
PROT SERPL-MCNC: 7.3 G/DL — SIGNIFICANT CHANGE UP (ref 6–8.3)
PROT UR-MCNC: 20 — SIGNIFICANT CHANGE UP
PROTHROM AB SERPL-ACNC: 12.2 SEC — SIGNIFICANT CHANGE UP (ref 9.8–13.1)
RBC # BLD: 4.74 M/UL — SIGNIFICANT CHANGE UP (ref 3.8–5.2)
RBC # FLD: 13.8 % — SIGNIFICANT CHANGE UP (ref 10.3–14.5)
RBC CASTS # UR COMP ASSIST: HIGH (ref 0–?)
RH IG SCN BLD-IMP: POSITIVE — SIGNIFICANT CHANGE UP
SAO2 % BLDV: 51.8 % — LOW (ref 60–85)
SODIUM SERPL-SCNC: 136 MMOL/L — SIGNIFICANT CHANGE UP (ref 135–145)
SP GR SPEC: 1.02 — SIGNIFICANT CHANGE UP (ref 1–1.04)
SQUAMOUS # UR AUTO: SIGNIFICANT CHANGE UP
TROPONIN T, HIGH SENSITIVITY: < 6 NG/L — SIGNIFICANT CHANGE UP (ref ?–14)
UROBILINOGEN FLD QL: NORMAL — SIGNIFICANT CHANGE UP
WBC # BLD: 4.81 K/UL — SIGNIFICANT CHANGE UP (ref 3.8–10.5)
WBC # FLD AUTO: 4.81 K/UL — SIGNIFICANT CHANGE UP (ref 3.8–10.5)
WBC UR QL: HIGH (ref 0–?)

## 2019-05-29 PROCEDURE — 99285 EMERGENCY DEPT VISIT HI MDM: CPT | Mod: 25

## 2019-05-29 PROCEDURE — 71275 CT ANGIOGRAPHY CHEST: CPT | Mod: 26

## 2019-05-29 PROCEDURE — 74177 CT ABD & PELVIS W/CONTRAST: CPT | Mod: 26

## 2019-05-29 PROCEDURE — 93010 ELECTROCARDIOGRAM REPORT: CPT

## 2019-05-29 RX ORDER — SODIUM CHLORIDE 9 MG/ML
1000 INJECTION INTRAMUSCULAR; INTRAVENOUS; SUBCUTANEOUS ONCE
Refills: 0 | Status: COMPLETED | OUTPATIENT
Start: 2019-05-29 | End: 2019-05-29

## 2019-05-29 RX ORDER — MORPHINE SULFATE 50 MG/1
2 CAPSULE, EXTENDED RELEASE ORAL ONCE
Refills: 0 | Status: DISCONTINUED | OUTPATIENT
Start: 2019-05-29 | End: 2019-05-29

## 2019-05-29 RX ORDER — ONDANSETRON 8 MG/1
4 TABLET, FILM COATED ORAL ONCE
Refills: 0 | Status: COMPLETED | OUTPATIENT
Start: 2019-05-29 | End: 2019-05-29

## 2019-05-29 RX ADMIN — SODIUM CHLORIDE 1000 MILLILITER(S): 9 INJECTION INTRAMUSCULAR; INTRAVENOUS; SUBCUTANEOUS at 17:06

## 2019-05-29 RX ADMIN — MORPHINE SULFATE 2 MILLIGRAM(S): 50 CAPSULE, EXTENDED RELEASE ORAL at 19:28

## 2019-05-29 RX ADMIN — ONDANSETRON 4 MILLIGRAM(S): 8 TABLET, FILM COATED ORAL at 18:37

## 2019-05-29 RX ADMIN — MORPHINE SULFATE 2 MILLIGRAM(S): 50 CAPSULE, EXTENDED RELEASE ORAL at 18:36

## 2019-05-29 RX ADMIN — ONDANSETRON 4 MILLIGRAM(S): 8 TABLET, FILM COATED ORAL at 17:06

## 2019-05-29 NOTE — ED ADULT NURSE REASSESSMENT NOTE - NS ED NURSE REASSESS COMMENT FT1
received report from crescencio DEAN. Pt states she has lower ABD pain but doesn't want to take anything for pain. Pt given heat packs for comfort. Pt is a/o x 3. no complaints of chest pain, headache, dizziness, vomiting, SOB, fever, chills verbalized. Pt has 20g iv placed to left AC via US with no redness or swelling noted. Awaiting further orders. Will continue to monitor.

## 2019-05-29 NOTE — ED PROVIDER NOTE - ATTENDING CONTRIBUTION TO CARE
67 yo female with PMH GIST tumor on CT presents to ED for evaluation of upper abd pain with nausea and no BM x  5 days. Denies vomiting or fevers, chills. States she has been having shortness of breath on exertion without chest pain over past few weeks.    Gen: no acute distress, well appearing, awake, alert and oriented x 3  Cardiac: regular rate and rhythm, +S1S2  Pulm: Clear to auscultation bilaterally  Abd: soft, +ttp upper abd, nondistended, no guarding    MDM  Female presents to ED for evaluation of upper abd pain with nausea x 5 days. Dyspnea on exertion for weeks, ?worsening. Concern for SBO mainly - will check CTA chest and AP for r/o PE as well. Labs, EKG, medicate, hydrate

## 2019-05-29 NOTE — ED ADULT NURSE REASSESSMENT NOTE - NS ED NURSE REASSESS COMMENT FT1
20g IV access in RAC infiltrated. Pt received hot packs onto area, IV pulled. MD at bedside made aware. Pt to receive another US IV. Pt verbalizing improvement in symptoms since receiving Morphine. Pt is sleeping but arousable to verbal stimuli. Respirations 16, breathing remains even and unlabored. MD at bedside, will continue to monitor.

## 2019-05-29 NOTE — ED ADULT TRIAGE NOTE - CHIEF COMPLAINT QUOTE
Pt co left sided abdominal pain and nausea x 5 days. Hx of stomach cancer surgery in 2017. Currently on oral chemo, last dose x 2 days ago.

## 2019-05-29 NOTE — ED PROVIDER NOTE - OBJECTIVE STATEMENT
Pt is 69 y/o female with PMhx of HTN, anemia, s/p GIST tumor removal, RA, GI bleed here for eval of abd pain and nausea x 5 days. As per pt she started with LUQ pain that was mild initially, has increased for the past 2 days, also radiating to LLQ , since yesterday with nausea as well; (+) constipation x 2 days (usually goes daily), also reports no flatus, denies fever, chills, abd distention, denies urinary sx, cp, sob. on oral chemo, last dose 2 days ago.

## 2019-05-29 NOTE — ED PROVIDER NOTE - PROGRESS NOTE DETAILS
JAGDISH Willett - received call from pt's oncologist - please get CTA chest as pt has been c/o sob, also call Dr Jose Montejo (oncology surgery) after imaging is performed, if admission needed, admit to medicine; Rui Cullen MD: Pt's imaging shows similar previous masses. Pt's symptoms improved and tolerating PO now. Has appointment with oncologist today. Will d/c.

## 2019-05-29 NOTE — ED PROVIDER NOTE - CLINICAL SUMMARY MEDICAL DECISION MAKING FREE TEXT BOX
abd pain, constipation, concern for sbo given past sx - labs, pain control, ct abd pelvis will reasses;

## 2019-05-29 NOTE — ED ADULT NURSE NOTE - NSIMPLEMENTINTERV_GEN_ALL_ED
Implemented All Universal Safety Interventions:  [place] to call system. Call bell, personal items and telephone within reach. Instruct patient to call for assistance. Room bathroom lighting operational. Non-slip footwear when patient is off stretcher. Physically safe environment: no spills, clutter or unnecessary equipment. Stretcher in lowest position, wheels locked, appropriate side rails in place.

## 2019-05-29 NOTE — ED PROVIDER NOTE - NSFOLLOWUPINSTRUCTIONS_ED_ALL_ED_FT
Please see your Oncologist as soon as possible regarding your results. Continue to use the oxycodone for pain as needed for the next two days until you can see your oncologist. Please return to the ED if you develop worsening pain and are no longer able to tolerate liquids or if you develop a fever.

## 2019-05-29 NOTE — ED ADULT NURSE NOTE - OBJECTIVE STATEMENT
Pt presenting to room 10 AxOx4 ambulatory at baseline with c/o lower abdominal pain and nausea. PMH of stomach ca- last chemo two days ago. Pt denies any significant cardiac history or other PMH. Pt denies vomiting, urinary symptoms, diarrhea, changes in bowel habits, CP, SOB. Breathing is even and unlabored on arrival to ED, pt satting well on RA. Abdomen is soft and nontender to touch. IV established with 20g in RAC, labs drawn and sent, MD at bedside, will continue to monitor.

## 2019-05-30 VITALS
TEMPERATURE: 98 F | SYSTOLIC BLOOD PRESSURE: 159 MMHG | HEART RATE: 70 BPM | OXYGEN SATURATION: 98 % | DIASTOLIC BLOOD PRESSURE: 69 MMHG | RESPIRATION RATE: 18 BRPM

## 2019-05-30 RX ORDER — MORPHINE SULFATE 50 MG/1
2 CAPSULE, EXTENDED RELEASE ORAL ONCE
Refills: 0 | Status: DISCONTINUED | OUTPATIENT
Start: 2019-05-30 | End: 2019-05-30

## 2019-05-30 RX ORDER — OXYCODONE HYDROCHLORIDE 5 MG/1
1 TABLET ORAL
Qty: 8 | Refills: 0
Start: 2019-05-30 | End: 2019-05-31

## 2019-05-30 RX ORDER — SODIUM CHLORIDE 9 MG/ML
1000 INJECTION INTRAMUSCULAR; INTRAVENOUS; SUBCUTANEOUS ONCE
Refills: 0 | Status: COMPLETED | OUTPATIENT
Start: 2019-05-30 | End: 2019-05-30

## 2019-05-30 RX ADMIN — MORPHINE SULFATE 2 MILLIGRAM(S): 50 CAPSULE, EXTENDED RELEASE ORAL at 03:51

## 2019-05-30 RX ADMIN — SODIUM CHLORIDE 2000 MILLILITER(S): 9 INJECTION INTRAMUSCULAR; INTRAVENOUS; SUBCUTANEOUS at 01:10

## 2019-05-30 RX ADMIN — MORPHINE SULFATE 2 MILLIGRAM(S): 50 CAPSULE, EXTENDED RELEASE ORAL at 02:20

## 2019-05-30 NOTE — ED POST DISCHARGE NOTE - RESULT SUMMARY
Pt called to state that the oxycodone rx that was sent to [facility] in [place], could not be filled because pharmacy was out of the medication. Call the pharmacy, confrimed they are out of stock of oxycodone and can not order the medication for one more week. Pharmacy with discontinue the rx. Rx now sent to [facility] in [facility] in [place].

## 2019-05-31 ENCOUNTER — INPATIENT (INPATIENT)
Facility: HOSPITAL | Age: 69
LOS: 11 days | Discharge: HOME HEALTH | End: 2019-06-12
Attending: STUDENT IN AN ORGANIZED HEALTH CARE EDUCATION/TRAINING PROGRAM | Admitting: STUDENT IN AN ORGANIZED HEALTH CARE EDUCATION/TRAINING PROGRAM
Payer: MEDICARE

## 2019-05-31 VITALS
TEMPERATURE: 99 F | SYSTOLIC BLOOD PRESSURE: 139 MMHG | RESPIRATION RATE: 18 BRPM | HEART RATE: 67 BPM | OXYGEN SATURATION: 99 % | DIASTOLIC BLOOD PRESSURE: 71 MMHG

## 2019-05-31 DIAGNOSIS — C49.A0 GASTROINTESTINAL STROMAL TUMOR, UNSPECIFIED SITE: ICD-10-CM

## 2019-05-31 DIAGNOSIS — Z98.890 OTHER SPECIFIED POSTPROCEDURAL STATES: Chronic | ICD-10-CM

## 2019-05-31 DIAGNOSIS — I10 ESSENTIAL (PRIMARY) HYPERTENSION: ICD-10-CM

## 2019-05-31 DIAGNOSIS — R10.9 UNSPECIFIED ABDOMINAL PAIN: ICD-10-CM

## 2019-05-31 DIAGNOSIS — Z29.9 ENCOUNTER FOR PROPHYLACTIC MEASURES, UNSPECIFIED: ICD-10-CM

## 2019-05-31 DIAGNOSIS — B19.10 UNSPECIFIED VIRAL HEPATITIS B WITHOUT HEPATIC COMA: ICD-10-CM

## 2019-05-31 DIAGNOSIS — M06.9 RHEUMATOID ARTHRITIS, UNSPECIFIED: ICD-10-CM

## 2019-05-31 LAB
ALBUMIN SERPL ELPH-MCNC: 4.4 G/DL — SIGNIFICANT CHANGE UP (ref 3.3–5)
ALP SERPL-CCNC: 46 U/L — SIGNIFICANT CHANGE UP (ref 40–120)
ALT FLD-CCNC: 14 U/L — SIGNIFICANT CHANGE UP (ref 4–33)
ANION GAP SERPL CALC-SCNC: 18 MMO/L — HIGH (ref 7–14)
APTT BLD: 26.6 SEC — LOW (ref 27.5–36.3)
AST SERPL-CCNC: 25 U/L — SIGNIFICANT CHANGE UP (ref 4–32)
BASOPHILS # BLD AUTO: 0.02 K/UL — SIGNIFICANT CHANGE UP (ref 0–0.2)
BASOPHILS NFR BLD AUTO: 0.3 % — SIGNIFICANT CHANGE UP (ref 0–2)
BILIRUB SERPL-MCNC: 0.7 MG/DL — SIGNIFICANT CHANGE UP (ref 0.2–1.2)
BLD GP AB SCN SERPL QL: NEGATIVE — SIGNIFICANT CHANGE UP
BUN SERPL-MCNC: 14 MG/DL — SIGNIFICANT CHANGE UP (ref 7–23)
CALCIUM SERPL-MCNC: 7.9 MG/DL — LOW (ref 8.4–10.5)
CHLORIDE SERPL-SCNC: 98 MMOL/L — SIGNIFICANT CHANGE UP (ref 98–107)
CO2 SERPL-SCNC: 18 MMOL/L — LOW (ref 22–31)
CREAT SERPL-MCNC: 0.58 MG/DL — SIGNIFICANT CHANGE UP (ref 0.5–1.3)
EOSINOPHIL # BLD AUTO: 0 K/UL — SIGNIFICANT CHANGE UP (ref 0–0.5)
EOSINOPHIL NFR BLD AUTO: 0 % — SIGNIFICANT CHANGE UP (ref 0–6)
GLUCOSE SERPL-MCNC: 111 MG/DL — HIGH (ref 70–99)
HCT VFR BLD CALC: 40.6 % — SIGNIFICANT CHANGE UP (ref 34.5–45)
HGB BLD-MCNC: 13 G/DL — SIGNIFICANT CHANGE UP (ref 11.5–15.5)
IMM GRANULOCYTES NFR BLD AUTO: 0.4 % — SIGNIFICANT CHANGE UP (ref 0–1.5)
INR BLD: 1.1 — SIGNIFICANT CHANGE UP (ref 0.88–1.17)
LIDOCAIN IGE QN: 15.2 U/L — SIGNIFICANT CHANGE UP (ref 7–60)
LYMPHOCYTES # BLD AUTO: 0.77 K/UL — LOW (ref 1–3.3)
LYMPHOCYTES # BLD AUTO: 11.5 % — LOW (ref 13–44)
MCHC RBC-ENTMCNC: 25.6 PG — LOW (ref 27–34)
MCHC RBC-ENTMCNC: 32 % — SIGNIFICANT CHANGE UP (ref 32–36)
MCV RBC AUTO: 79.9 FL — LOW (ref 80–100)
MONOCYTES # BLD AUTO: 0.51 K/UL — SIGNIFICANT CHANGE UP (ref 0–0.9)
MONOCYTES NFR BLD AUTO: 7.6 % — SIGNIFICANT CHANGE UP (ref 2–14)
NEUTROPHILS # BLD AUTO: 5.39 K/UL — SIGNIFICANT CHANGE UP (ref 1.8–7.4)
NEUTROPHILS NFR BLD AUTO: 80.2 % — HIGH (ref 43–77)
NRBC # FLD: 0 K/UL — SIGNIFICANT CHANGE UP (ref 0–0)
PLATELET # BLD AUTO: 214 K/UL — SIGNIFICANT CHANGE UP (ref 150–400)
PMV BLD: 10.7 FL — SIGNIFICANT CHANGE UP (ref 7–13)
POTASSIUM SERPL-MCNC: 3.9 MMOL/L — SIGNIFICANT CHANGE UP (ref 3.5–5.3)
POTASSIUM SERPL-SCNC: 3.9 MMOL/L — SIGNIFICANT CHANGE UP (ref 3.5–5.3)
PROT SERPL-MCNC: 7.3 G/DL — SIGNIFICANT CHANGE UP (ref 6–8.3)
PROTHROM AB SERPL-ACNC: 12.3 SEC — SIGNIFICANT CHANGE UP (ref 9.8–13.1)
RBC # BLD: 5.08 M/UL — SIGNIFICANT CHANGE UP (ref 3.8–5.2)
RBC # FLD: 13.6 % — SIGNIFICANT CHANGE UP (ref 10.3–14.5)
RH IG SCN BLD-IMP: POSITIVE — SIGNIFICANT CHANGE UP
SODIUM SERPL-SCNC: 134 MMOL/L — LOW (ref 135–145)
SPECIMEN SOURCE: SIGNIFICANT CHANGE UP
WBC # BLD: 6.72 K/UL — SIGNIFICANT CHANGE UP (ref 3.8–10.5)
WBC # FLD AUTO: 6.72 K/UL — SIGNIFICANT CHANGE UP (ref 3.8–10.5)

## 2019-05-31 PROCEDURE — 93010 ELECTROCARDIOGRAM REPORT: CPT

## 2019-05-31 RX ORDER — ACETAMINOPHEN 500 MG
650 TABLET ORAL EVERY 6 HOURS
Refills: 0 | Status: DISCONTINUED | OUTPATIENT
Start: 2019-05-31 | End: 2019-06-12

## 2019-05-31 RX ORDER — FUROSEMIDE 40 MG
1 TABLET ORAL
Qty: 0 | Refills: 0 | DISCHARGE

## 2019-05-31 RX ORDER — AMLODIPINE BESYLATE 2.5 MG/1
5 TABLET ORAL DAILY
Refills: 0 | Status: DISCONTINUED | OUTPATIENT
Start: 2019-05-31 | End: 2019-06-12

## 2019-05-31 RX ORDER — ONDANSETRON 8 MG/1
4 TABLET, FILM COATED ORAL ONCE
Refills: 0 | Status: COMPLETED | OUTPATIENT
Start: 2019-05-31 | End: 2019-05-31

## 2019-05-31 RX ORDER — TENOFOVIR DISOPROXIL FUMARATE 300 MG/1
300 TABLET, FILM COATED ORAL DAILY
Refills: 0 | Status: DISCONTINUED | OUTPATIENT
Start: 2019-05-31 | End: 2019-06-12

## 2019-05-31 RX ORDER — MORPHINE SULFATE 50 MG/1
2 CAPSULE, EXTENDED RELEASE ORAL ONCE
Refills: 0 | Status: DISCONTINUED | OUTPATIENT
Start: 2019-05-31 | End: 2019-05-31

## 2019-05-31 RX ORDER — ALBUTEROL 90 UG/1
2 AEROSOL, METERED ORAL EVERY 6 HOURS
Refills: 0 | Status: DISCONTINUED | OUTPATIENT
Start: 2019-05-31 | End: 2019-06-12

## 2019-05-31 RX ORDER — SODIUM CHLORIDE 9 MG/ML
1000 INJECTION INTRAMUSCULAR; INTRAVENOUS; SUBCUTANEOUS ONCE
Refills: 0 | Status: COMPLETED | OUTPATIENT
Start: 2019-05-31 | End: 2019-05-31

## 2019-05-31 RX ORDER — MORPHINE SULFATE 50 MG/1
4 CAPSULE, EXTENDED RELEASE ORAL ONCE
Refills: 0 | Status: DISCONTINUED | OUTPATIENT
Start: 2019-05-31 | End: 2019-05-31

## 2019-05-31 RX ORDER — AMOXICILLIN 250 MG/5ML
0 SUSPENSION, RECONSTITUTED, ORAL (ML) ORAL
Qty: 0 | Refills: 0 | DISCHARGE

## 2019-05-31 RX ORDER — HEPARIN SODIUM 5000 [USP'U]/ML
5000 INJECTION INTRAVENOUS; SUBCUTANEOUS EVERY 8 HOURS
Refills: 0 | Status: DISCONTINUED | OUTPATIENT
Start: 2019-05-31 | End: 2019-06-05

## 2019-05-31 RX ORDER — LISINOPRIL 2.5 MG/1
20 TABLET ORAL DAILY
Refills: 0 | Status: DISCONTINUED | OUTPATIENT
Start: 2019-05-31 | End: 2019-06-12

## 2019-05-31 RX ORDER — SODIUM CHLORIDE 9 MG/ML
1000 INJECTION INTRAMUSCULAR; INTRAVENOUS; SUBCUTANEOUS
Refills: 0 | Status: DISCONTINUED | OUTPATIENT
Start: 2019-05-31 | End: 2019-06-01

## 2019-05-31 RX ADMIN — SODIUM CHLORIDE 1000 MILLILITER(S): 9 INJECTION INTRAMUSCULAR; INTRAVENOUS; SUBCUTANEOUS at 16:20

## 2019-05-31 RX ADMIN — MORPHINE SULFATE 2 MILLIGRAM(S): 50 CAPSULE, EXTENDED RELEASE ORAL at 21:58

## 2019-05-31 RX ADMIN — HEPARIN SODIUM 5000 UNIT(S): 5000 INJECTION INTRAVENOUS; SUBCUTANEOUS at 21:25

## 2019-05-31 RX ADMIN — ONDANSETRON 4 MILLIGRAM(S): 8 TABLET, FILM COATED ORAL at 21:24

## 2019-05-31 RX ADMIN — ONDANSETRON 4 MILLIGRAM(S): 8 TABLET, FILM COATED ORAL at 16:20

## 2019-05-31 RX ADMIN — MORPHINE SULFATE 4 MILLIGRAM(S): 50 CAPSULE, EXTENDED RELEASE ORAL at 16:20

## 2019-05-31 RX ADMIN — MORPHINE SULFATE 2 MILLIGRAM(S): 50 CAPSULE, EXTENDED RELEASE ORAL at 21:41

## 2019-05-31 RX ADMIN — SODIUM CHLORIDE 100 MILLILITER(S): 9 INJECTION INTRAMUSCULAR; INTRAVENOUS; SUBCUTANEOUS at 21:01

## 2019-05-31 NOTE — H&P ADULT - NSICDXPASTMEDICALHX_GEN_ALL_CORE_FT
PAST MEDICAL HISTORY:  Anemia     Hypertension     Obese     RA (rheumatoid arthritis)     Stomach cancer     Thyroid nodule

## 2019-05-31 NOTE — H&P ADULT - PROBLEM SELECTOR PLAN 2
Detail Level: Detailed - hx of GIST diagnosed in 2017 s/p resection and previously on Gleevec which was discontinued 2/2 edema, now on tasigna with CT scans indicative of worsening metastatic disease   - Onc Consult in the morning for tasigna reinitiation   - Surg Onc consult as above for evaluation - hx of GIST diagnosed in 2017 s/p resection and previously on Gleevec which was discontinued 2/2 edema, now on tasigna with CT scans indicative of worsening metastatic disease   - Onc Consult in the morning for tasigna reinitiation   - Surg Onc consult as above for evaluation  - previously hepatitis serologies with both hep B core and surface antibodies positive, will continue with viread as per home regimen given that patient is on TKI - hx of GIST diagnosed in 2017 s/p resection and previously on Gleevec which was discontinued 2/2 edema, now on tasigna with CT scans indicative of worsening metastatic disease   - Onc Consult in the morning   - Surg Onc consult as above for evaluation  - previously hepatitis serologies with both hep B core and surface antibodies positive, will continue with viread as per home regimen given that patient is on TKI 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

## 2019-05-31 NOTE — ED PROVIDER NOTE - ATTENDING CONTRIBUTION TO CARE
Pt was seen and evaluated by me. Pt is a 67 y/o female with PMHx of GIST tumor, Anemia, HTN, and RA who presented to the ED for abd pain and nausea. Pt has a known GIST tumor being followed by Dr. Tolbert who sent pt in for evaluation 2 days ago and for evaluation for by Dr. Tapia of Surgery Oncology. Pt states she was seen and had CT and sent home after tolerating PO. Pt notes still having pain and took the medication she was given once but it did not help so she stopped taking it. Pt notes having left abd pain with nausea. Notes last BM was a few days ago. Pt denies any fever, chills, SOB, or chest pain. Lungs CTA b/l. RRR. Abd soft, non-tender.

## 2019-05-31 NOTE — ED ADULT NURSE NOTE - OBJECTIVE STATEMENT
Pt received in #24, aaox3 with c/o ongoing abd pain. States she has been having abd pain ~1 week, was seen in the ED 2 days ago and subsequently discharged. Pt presents today due to ongoing abd pain with mild nausea. Pt denies cp, sob, vomiting, dizziness, diaphoresis, fever or chills. Pt on CM in SR, IV established, labs sent.

## 2019-05-31 NOTE — ED ADULT TRIAGE NOTE - CHIEF COMPLAINT QUOTE
states " I am having pain in my stomach since 5 days, was seen and released yesterday from ER and still has the pain". patient's primary asked te patient to come to ER to get checked and will call Dr Leach for her

## 2019-05-31 NOTE — ED PROVIDER NOTE - OBJECTIVE STATEMENT
67 y/o female with PMHx of GIST tumor, Anemia, HTN, and RA who presented to the ED for abd pain and nausea. Pt has a known GIST tumor being followed by Dr. Tolbert who sent pt in for evaluation 2 days ago and for evaluation for by Dr. Tapia of Surgery Oncology. Pt states she was seen and had CT and sent home after tolerating PO. Pt notes still having pain and took the medication she was given once but it did not help so she stopped taking it. Pt notes having left abd pain with nausea. Notes last BM was a few days ago. Pt denies any fever, chills, SOB, or chest pain. 67 y/o female with PMHx of GIST tumor, Anemia, HTN, and RA who presented to the ED for abd pain and nausea. Pt has a known GIST tumor being followed by Dr. Tolbert who sent pt in for evaluation 2 days ago and for evaluation for by Dr. Montejo of Surgery Oncology. Pt states she was seen and had CT and sent home after tolerating PO. Pt notes still having pain and took the medication she was given once but it did not help so she stopped taking it. Pt notes having left abd pain with nausea. Notes last BM was a few days ago. Pt denies any fever, chills, SOB, or chest pain.

## 2019-05-31 NOTE — H&P ADULT - NSHPOUTPATIENTPROVIDERS_GEN_ALL_CORE
Dr. Tolbert (Heme/Onc)  [place] Lin (PMD)  Dr. Dominguez (Rheum)   Patient has a cardiolgist as well but does not known the name Dr. Tolbert (Heme/Onc)  [place] Lin (PMD)  Dr. Dominguez (Rheum)   Patient has a cardiologist as well but does not known the name

## 2019-05-31 NOTE — H&P ADULT - ASSESSMENT
68 year old female with hx of GIST tumor (s/p resection in 2017, intermittently on gleevec -->now on tasigna), HTN, RA who presents with abdominal pain x 5 days with seen exophytic mass in setting of metastatic GIST

## 2019-05-31 NOTE — H&P ADULT - PROBLEM SELECTOR PLAN 4
- hx of RA with left knee pain on plaquenil  - given hx of vision bluriness for the past several months - - will hold off on restarting plaquenil  - opthalmology consult prior to reinitiation - c/w home regimen antihypertensives: amlodipine and lisinopril Essential hypertension  - c/w home regimen antihypertensives: amlodipine and lisinopril

## 2019-05-31 NOTE — H&P ADULT - NSHPREVIEWOFSYSTEMS_GEN_ALL_CORE
General: denies fevers, chills, significant changes in weight   HEENT: reports vision blurriness which has been going on for several months, denies any trouble or pain with swallowing  Cardiac: denies any chest pain, pressure or palpitations   Lungs: denies cough, + shortness of breath on exertion for the past several months, denies any trouble lying flat, pleuritic chest pain  Abdomen: + abdominal pain 10/10 in the lower abdomen, + nausea, denies vomiting, diarrhea, constipation, BRBPR or melena   Extremities: denies any focal pain, weakness or numbness  Heme: denies any bleeding or bruising   Psych: denies any SI/HI   Neuro: denies any dizziness, lightheadedness, confusion, focal weakness or numbness   Skin: denies any open lesions or sores General: denies fevers, chills, significant changes in weight   HEENT: reports vision blurriness which has been going on for several months, denies any trouble or pain with swallowing  Cardiac: denies any chest pain, pressure or palpitations   Lungs: denies cough, + shortness of breath on exertion for the past several months, denies any trouble lying flat or pleuritic chest pain  Abdomen: + abdominal pain 10/10 in the lower abdomen, + nausea, denies vomiting, diarrhea, constipation, BRBPR or melena; passing flatus  Extremities: denies any focal pain, weakness or numbness  Heme: denies any bleeding or bruising   Psych: denies any SI/HI   Neuro: denies any dizziness, lightheadedness, confusion, focal weakness or numbness; (+)HA  MSK: ambulates without aid, no falls  Skin: denies any open lesions or sores General: denies fevers, chills, significant changes in weight   HEENT: reports vision blurriness which has been going on for several months, denies any trouble or pain with swallowing  Cardiac: denies any chest pain, pressure or palpitations   Lungs: denies cough, + shortness of breath on exertion for the past several months, denies any trouble lying flat or pleuritic chest pain  Abdomen: + abdominal pain 10/10 in the lower abdomen, + nausea, denies vomiting, diarrhea, constipation, BRBPR or melena; passing flatus  Extremities: denies any focal pain, weakness or numbness; no LE edema  Heme: denies any bleeding or bruising   Psych: denies any SI/HI   Neuro: denies any dizziness, lightheadedness, confusion, focal weakness or numbness; (+)HA  MSK: ambulates without aid, no falls; b/l knee arthralgias  Skin: denies any open lesions or sores

## 2019-05-31 NOTE — H&P ADULT - PROBLEM SELECTOR PLAN 1
- pt presented with 5 days of left lower abdominal pain with no clinical signs of infection or obstruction, suggestive of exophytic mass seen on CT as likely cause  - will consult surgical oncology, previously followed with Gustabo with loss to follow up   - if no surgical intervention offer, will need to consider endoscopic intervention with GI   - MIVF, Full liquid diet and NPO after midnight at this time - pt presented with 5 days of left lower abdominal pain with no clinical signs of infection or obstruction, suggestive of exophytic mass seen on CT as likely cause  - will consult surgical oncology, previously followed with Gustabo with loss to follow up   - if no surgical intervention offer, will need to consider endoscopic intervention with GI   - MIVF, Full liquid diet and NPO after midnight at this time  - recent (+)UA/Cx with GBS, patient asymptomatic, repeat UA pending

## 2019-05-31 NOTE — ED PROVIDER NOTE - CLINICAL SUMMARY MEDICAL DECISION MAKING FREE TEXT BOX
69 y/o female with PMHx of GIST tumor, Anemia, HTN, and RA who presented to the ED for abd pain and nausea. Previous CT showed increased size in tumor. Sent in for Surgery Oncology evaluation. Labs, IVF, Analgesia.

## 2019-05-31 NOTE — ED PROVIDER NOTE - PROGRESS NOTE DETAILS
Dr. Mejia: Placed call for Dr. Jose Montejo for Surgical Oncology consult. Spoke with Dr. Tolbert ([phone]) who recommenced medical admission and to be seen by Dr. Montejo. Pt was also seen previously by Dr. Deleon.

## 2019-05-31 NOTE — H&P ADULT - HISTORY OF PRESENT ILLNESS
This is a 68 year old female with hx of GIST tumor (s/p resection in 2017, intermittently on gleevec -->now on tasigna), HTN, RA who presents with abdominal pain x 5 days. States that the pain is in her lower abdomen all throughout, cannot characterize but states 10/10 pain. States that it has come on and off in the past but resolved on its own and is now persistent. Reports associated nausea, and decreased PO intake but no vomiting. Was here yesterday and had prescribed oxycodone for pain which had helped. Last BM was 3 days ago with normal caliber stool. Denies any BRBPR or melena. Denies fevers, chills and states she feels thinner from not eating for the past 5 days. States that is afraid to eat for fear of worsening pain.     In terms of her GIST tumor: diagnosed in 2017 w/ resection of the GIST tumor. Patient was on gleevec on three separate occasions but had to stop due to edema. Had a surveillance scan in 2018 which had revealed a lesion in the inferior pubic ramus and hypodense liver lesion which was biopsied 10/2018 with path report: metastatic GIST tumor. As per review - - was recommended to go [place] [facility] [place] Dr. Gastelum but had never went. Now sent in by her medical oncologist for surg onc evaluation.     In the ED:  Afebrile HR 61-67 -160/59-71 RR 18 O2:  RA  Given Morphine 4mg x 1, Zofran 4mg x 1, NS 1 L bolus This is a 68 year old female with hx of GIST tumor (s/p resection in 2017, intermittently on gleevec -->now on tasigna), HTN, RA who presents with abdominal pain x 5 days. States that the pain is in her lower abdomen all throughout, cannot characterize but states 10/10 pain. States that it has come on and off in the past but resolved on its own and is now persistent. Reports associated nausea, and decreased PO intake but no vomiting. Was here yesterday and had prescribed oxycodone for pain which had helped. Last BM was 3 days ago with normal caliber stool. Denies any BRBPR or melena. Denies fevers, chills and states she feels thinner from not eating for the past 5 days. States that is afraid to eat for fear of worsening pain. No alcohol or NSAID use.    In terms of her GIST tumor: diagnosed in 2017 w/ resection of the GIST tumor. Patient was on gleevec on three separate occasions but had to stop due to edema. Had a surveillance scan in 2018 which had revealed a lesion in the inferior pubic ramus and hypodense liver lesion which was biopsied 10/2018 with path report: metastatic GIST tumor. As per review - - was recommended to go [place] [facility] [place] Dr. Gastelum but had never went. Now sent in by her medical oncologist for surg onc evaluation.     In the ED:  Afebrile HR 61-67 -160/59-71 RR 18 O2:  RA  Given Morphine 4mg x 1, Zofran 4mg x 1, NS 1 L bolus

## 2019-05-31 NOTE — H&P ADULT - NSHPLABSRESULTS_GEN_ALL_CORE
EKG, Imaging and results personally reviewed by me     EKG: Ordered 5/29 EGK: NSR, no ST - T wave changes                           13.0   6.72  )-----------( 214      ( 31 May 2019 16:16 )             40.6     05-31    134<L>  |  98  |  14  ----------------------------<  111<H>  3.9   |  18<L>  |  0.58    Ca    7.9<L>      31 May 2019 16:16    TPro  7.3  /  Alb  4.4  /  TBili  0.7  /  DBili  x   /  AST  25  /  ALT  14  /  AlkPhos  46  05-31    PT/INR - ( 31 May 2019 16:16 )   PT: 12.3 SEC;   INR: 1.10       PTT - ( 31 May 2019 16:16 )  PTT:26.6 SEC  Lipase 15.2     < from: CT Angio Chest w/ IV Cont (05.29.19 @ 22:36) >    FINDINGS:    CHEST:     LUNGS AND LARGE AIRWAYS: Patent central airways. No pulmonary nodules.  PLEURA: No pleural effusion.  VESSELS: Atherosclerotic changes. Study is nondiagnostic for evaluation   of the pulmonary arteries due to poor opacification.  HEART: Heart size is normal. No pericardial effusion.  MEDIASTINUM AND ENRIQUE: No lymphadenopathy.  CHEST WALL AND LOWER NECK: Multiple heterogeneous thyroid nodules.    ABDOMEN AND PELVIS:    LIVER: Indeterminate hypodense lesion measuring 2.3 cm in the right   hepatic lobe (2:141).   BILE DUCTS: Normal caliber.  GALLBLADDER: Within normal limits.  SPLEEN: Within normal limits.  PANCREAS: Within normal limits.  ADRENALS: Within normal limits.  KIDNEYS/URETERS: No hydronephrosis.    BLADDER: Within normal limits.  REPRODUCTIVE ORGANS: Calcified uterine fibroid. Bilateral adnexa within   normal limits.    BOWEL: No bowel obstruction. Scattered colonic diverticuli. Appendix not   visualized. There is an exophytic mass measuring 5.6 x 4.6 x 5 cm in the   gastrohepatic space. It is unclear whether this is exophytic from the   left hepatic lobe or arising from the wall of the adjacent stomach..  PERITONEUM: No ascites.  VESSELS:  Atherosclerotic changes.  RETROPERITONEUM: No lymphadenopathy.    ABDOMINAL WALL: Within normal limits.  BONES: Degenerative changes.    IMPRESSION:     Study is nondiagnostic for evaluation for pulmonary emboli.    Solid mass in the gastrohepatic space demonstrates an interval increase   since prior study of 8/16/2018. It is unclear if this is exophytic from   the adjacent liver or arising from the gastric wall. Endoscopic   ultrasound is recommended.    An indeterminant hypodense lesion measuring 2.3 cm in the right hepatic   lobe, demonstrating interval increase in size since 8/16/2018. Primary   consideration is hepatic metastasis. Consider PET scan correlation.    < end of copied text > EKG, Imaging and results personally reviewed by me                         13.0   6.72  )-----------( 214      ( 31 May 2019 16:16 )             40.6     134<L>  |  98  |  14  ----------------------------<  111<H>  3.9   |  18<L>  |  0.58    Ca    7.9<L>      31 May 2019 16:16    TPro  7.3  /  Alb  4.4  /  TBili  0.7  /  DBili  x   /  AST  25  /  ALT  14  /  AlkPhos  46  05-31    PT/INR - ( 31 May 2019 16:16 )   PT: 12.3 SEC;   INR: 1.10     PTT - ( 31 May 2019 16:16 )  PTT:26.6 SEC    Lipase 15.2     < from: CT Angio Chest w/ IV Cont (05.29.19 @ 22:36) >  FINDINGS:  CHEST:   LUNGS AND LARGE AIRWAYS: Patent central airways. No pulmonary nodules.  PLEURA: No pleural effusion.  VESSELS: Atherosclerotic changes. Study is nondiagnostic for evaluation of the pulmonary arteries due to poor opacification.  HEART: Heart size is normal. No pericardial effusion.  MEDIASTINUM AND ENRIQUE: No lymphadenopathy.  CHEST WALL AND LOWER NECK: Multiple heterogeneous thyroid nodules.  ABDOMEN AND PELVIS:  LIVER: Indeterminate hypodense lesion measuring 2.3 cm in the right hepatic lobe (2:141).   BILE DUCTS: Normal caliber.  GALLBLADDER: Within normal limits.  SPLEEN: Within normal limits.  PANCREAS: Within normal limits.  ADRENALS: Within normal limits.  KIDNEYS/URETERS: No hydronephrosis.  BLADDER: Within normal limits.  REPRODUCTIVE ORGANS: Calcified uterine fibroid. Bilateral adnexa within normal limits.  BOWEL: No bowel obstruction. Scattered colonic diverticuli. Appendix not visualized. There is an exophytic mass measuring 5.6 x 4.6 x 5 cm in the gastrohepatic space. It is unclear whether this is exophytic from the left hepatic lobe or arising from the wall of the adjacent stomach.  PERITONEUM: No ascites.  VESSELS:  Atherosclerotic changes.  RETROPERITONEUM: No lymphadenopathy.    ABDOMINAL WALL: Within normal limits.  BONES: Degenerative changes.  IMPRESSION: Study is nondiagnostic for evaluation for pulmonary emboli. Solid mass in the gastrohepatic space demonstrates an interval increase since prior study of 8/16/2018. It is unclear if this is exophytic from the adjacent liver or arising from the gastric wall. Endoscopic   ultrasound is recommended. An indeterminant hypodense lesion measuring 2.3 cm in the right hepatic lobe, demonstrating interval increase in size since 8/16/2018. Primary consideration is hepatic metastasis. Consider PET scan correlation.   < end of copied text >    EKG: Ordered 5/29 EGK: NSR, no ST - T wave changes EKG, Imaging and results personally reviewed by me                         13.0   6.72  )-----------( 214      ( 31 May 2019 16:16 )             40.6     134<L>  |  98  |  14  ----------------------------<  111<H>  3.9   |  18<L>  |  0.58    Ca    7.9<L>      31 May 2019 16:16    TPro  7.3  /  Alb  4.4  /  TBili  0.7  /  DBili  x   /  AST  25  /  ALT  14  /  AlkPhos  46  05-31    PT/INR - ( 31 May 2019 16:16 )   PT: 12.3 SEC;   INR: 1.10     PTT - ( 31 May 2019 16:16 )  PTT:26.6 SEC    Lipase 15.2     < from: CT Angio Chest w/ IV Cont (05.29.19 @ 22:36) >  FINDINGS:  CHEST:   LUNGS AND LARGE AIRWAYS: Patent central airways. No pulmonary nodules.  PLEURA: No pleural effusion.  VESSELS: Atherosclerotic changes. Study is nondiagnostic for evaluation of the pulmonary arteries due to poor opacification.  HEART: Heart size is normal. No pericardial effusion.  MEDIASTINUM AND ENRIQUE: No lymphadenopathy.  CHEST WALL AND LOWER NECK: Multiple heterogeneous thyroid nodules.  ABDOMEN AND PELVIS:  LIVER: Indeterminate hypodense lesion measuring 2.3 cm in the right hepatic lobe (2:141).   BILE DUCTS: Normal caliber.  GALLBLADDER: Within normal limits.  SPLEEN: Within normal limits.  PANCREAS: Within normal limits.  ADRENALS: Within normal limits.  KIDNEYS/URETERS: No hydronephrosis.  BLADDER: Within normal limits.  REPRODUCTIVE ORGANS: Calcified uterine fibroid. Bilateral adnexa within normal limits.  BOWEL: No bowel obstruction. Scattered colonic diverticuli. Appendix not visualized. There is an exophytic mass measuring 5.6 x 4.6 x 5 cm in the gastrohepatic space. It is unclear whether this is exophytic from the left hepatic lobe or arising from the wall of the adjacent stomach.  PERITONEUM: No ascites.  VESSELS:  Atherosclerotic changes.  RETROPERITONEUM: No lymphadenopathy.    ABDOMINAL WALL: Within normal limits.  BONES: Degenerative changes.  IMPRESSION: Study is nondiagnostic for evaluation for pulmonary emboli. Solid mass in the gastrohepatic space demonstrates an interval increase since prior study of 8/16/2018. It is unclear if this is exophytic from the adjacent liver or arising from the gastric wall. Endoscopic   ultrasound is recommended. An indeterminant hypodense lesion measuring 2.3 cm in the right hepatic lobe, demonstrating interval increase in size since 8/16/2018. Primary consideration is hepatic metastasis. Consider PET scan correlation.   < end of copied text >    EKG personally reviewed: 62bpm NSR, TWI V1-V2 (only new in V2); QTc 468ms

## 2019-05-31 NOTE — H&P ADULT - PROBLEM SELECTOR PLAN 3
- c/w home regimen antihypertensives: amlodipine and lisinopril - patient with prior hep B core Ab reactive and Surface Ab positive suggestive of chronic infection  - will restart viread as per home regimen given that patient is on TKI

## 2019-05-31 NOTE — H&P ADULT - ATTENDING COMMENTS
Agree with resident H&P and plan as edited above. Agree with resident H&P and plan as edited above. ED note reviewed, surg/onc contacted by ED, to sha in AM.

## 2019-05-31 NOTE — H&P ADULT - PROBLEM SELECTOR PLAN 6
DVT PPx: HSQ in anticipation of possible surgical intervention    Rowena Kathleen  PGY2  [phone]

## 2019-05-31 NOTE — H&P ADULT - PROBLEM SELECTOR PLAN 5
DVT PPx: HSQ in anticipation of possible surgical intervention    Rowena Kathleen  PGY2  [phone] - hx of RA with left knee pain on plaquenil  - given hx of vision bluriness for the past several months - - will hold off on restarting plaquenil  - opthalmology consult prior to reinitiation - hx of RA with left knee pain on plaquenil  - given hx of vision blurriness for the past several months - - will hold off on restarting plaquenil  - opthalmology consult prior to reinitiation

## 2019-06-01 LAB
ALBUMIN SERPL ELPH-MCNC: 4.1 G/DL — SIGNIFICANT CHANGE UP (ref 3.3–5)
ALP SERPL-CCNC: 43 U/L — SIGNIFICANT CHANGE UP (ref 40–120)
ALT FLD-CCNC: 12 U/L — SIGNIFICANT CHANGE UP (ref 4–33)
ANION GAP SERPL CALC-SCNC: 17 MMO/L — HIGH (ref 7–14)
APTT BLD: 27.1 SEC — LOW (ref 27.5–36.3)
AST SERPL-CCNC: 22 U/L — SIGNIFICANT CHANGE UP (ref 4–32)
BILIRUB SERPL-MCNC: 0.6 MG/DL — SIGNIFICANT CHANGE UP (ref 0.2–1.2)
BUN SERPL-MCNC: 10 MG/DL — SIGNIFICANT CHANGE UP (ref 7–23)
CALCIUM SERPL-MCNC: 7.3 MG/DL — LOW (ref 8.4–10.5)
CHLORIDE SERPL-SCNC: 98 MMOL/L — SIGNIFICANT CHANGE UP (ref 98–107)
CO2 SERPL-SCNC: 19 MMOL/L — LOW (ref 22–31)
CREAT SERPL-MCNC: 0.53 MG/DL — SIGNIFICANT CHANGE UP (ref 0.5–1.3)
GLUCOSE SERPL-MCNC: 104 MG/DL — HIGH (ref 70–99)
HCT VFR BLD CALC: 37.4 % — SIGNIFICANT CHANGE UP (ref 34.5–45)
HGB BLD-MCNC: 12.2 G/DL — SIGNIFICANT CHANGE UP (ref 11.5–15.5)
INR BLD: 1.11 — SIGNIFICANT CHANGE UP (ref 0.88–1.17)
MAGNESIUM SERPL-MCNC: 2.1 MG/DL — SIGNIFICANT CHANGE UP (ref 1.6–2.6)
MCHC RBC-ENTMCNC: 25.8 PG — LOW (ref 27–34)
MCHC RBC-ENTMCNC: 32.6 % — SIGNIFICANT CHANGE UP (ref 32–36)
MCV RBC AUTO: 79.2 FL — LOW (ref 80–100)
NRBC # FLD: 0 K/UL — SIGNIFICANT CHANGE UP (ref 0–0)
PHOSPHATE SERPL-MCNC: 1.7 MG/DL — LOW (ref 2.5–4.5)
PLATELET # BLD AUTO: 204 K/UL — SIGNIFICANT CHANGE UP (ref 150–400)
PMV BLD: 10.9 FL — SIGNIFICANT CHANGE UP (ref 7–13)
POTASSIUM SERPL-MCNC: 3.3 MMOL/L — LOW (ref 3.5–5.3)
POTASSIUM SERPL-SCNC: 3.3 MMOL/L — LOW (ref 3.5–5.3)
PROT SERPL-MCNC: 6.7 G/DL — SIGNIFICANT CHANGE UP (ref 6–8.3)
PROTHROM AB SERPL-ACNC: 12.7 SEC — SIGNIFICANT CHANGE UP (ref 9.8–13.1)
RBC # BLD: 4.72 M/UL — SIGNIFICANT CHANGE UP (ref 3.8–5.2)
RBC # FLD: 13.6 % — SIGNIFICANT CHANGE UP (ref 10.3–14.5)
SODIUM SERPL-SCNC: 134 MMOL/L — LOW (ref 135–145)
WBC # BLD: 6.19 K/UL — SIGNIFICANT CHANGE UP (ref 3.8–10.5)
WBC # FLD AUTO: 6.19 K/UL — SIGNIFICANT CHANGE UP (ref 3.8–10.5)

## 2019-06-01 PROCEDURE — 12345: CPT | Mod: NC

## 2019-06-01 PROCEDURE — 99223 1ST HOSP IP/OBS HIGH 75: CPT

## 2019-06-01 PROCEDURE — 99223 1ST HOSP IP/OBS HIGH 75: CPT | Mod: GC

## 2019-06-01 PROCEDURE — 74177 CT ABD & PELVIS W/CONTRAST: CPT | Mod: 26

## 2019-06-01 RX ORDER — ONDANSETRON 8 MG/1
4 TABLET, FILM COATED ORAL EVERY 6 HOURS
Refills: 0 | Status: DISCONTINUED | OUTPATIENT
Start: 2019-06-01 | End: 2019-06-12

## 2019-06-01 RX ORDER — LIDOCAINE 4 G/100G
1 CREAM TOPICAL EVERY 24 HOURS
Refills: 0 | Status: DISCONTINUED | OUTPATIENT
Start: 2019-06-01 | End: 2019-06-12

## 2019-06-01 RX ORDER — POTASSIUM CHLORIDE 20 MEQ
40 PACKET (EA) ORAL ONCE
Refills: 0 | Status: COMPLETED | OUTPATIENT
Start: 2019-06-01 | End: 2019-06-01

## 2019-06-01 RX ORDER — MORPHINE SULFATE 50 MG/1
2 CAPSULE, EXTENDED RELEASE ORAL EVERY 4 HOURS
Refills: 0 | Status: DISCONTINUED | OUTPATIENT
Start: 2019-06-01 | End: 2019-06-06

## 2019-06-01 RX ORDER — HYDROXYCHLOROQUINE SULFATE 200 MG
1 TABLET ORAL
Qty: 0 | Refills: 0 | DISCHARGE

## 2019-06-01 RX ORDER — POTASSIUM PHOSPHATE, MONOBASIC POTASSIUM PHOSPHATE, DIBASIC 236; 224 MG/ML; MG/ML
15 INJECTION, SOLUTION INTRAVENOUS ONCE
Refills: 0 | Status: COMPLETED | OUTPATIENT
Start: 2019-06-01 | End: 2019-06-01

## 2019-06-01 RX ORDER — SODIUM CHLORIDE 9 MG/ML
1000 INJECTION, SOLUTION INTRAVENOUS
Refills: 0 | Status: DISCONTINUED | OUTPATIENT
Start: 2019-06-01 | End: 2019-06-12

## 2019-06-01 RX ORDER — MORPHINE SULFATE 50 MG/1
4 CAPSULE, EXTENDED RELEASE ORAL EVERY 4 HOURS
Refills: 0 | Status: DISCONTINUED | OUTPATIENT
Start: 2019-06-01 | End: 2019-06-06

## 2019-06-01 RX ORDER — ALBUTEROL 90 UG/1
2 AEROSOL, METERED ORAL
Qty: 0 | Refills: 0 | DISCHARGE

## 2019-06-01 RX ORDER — LIDOCAINE 4 G/100G
1 CREAM TOPICAL EVERY 24 HOURS
Refills: 0 | Status: DISCONTINUED | OUTPATIENT
Start: 2019-06-01 | End: 2019-06-02

## 2019-06-01 RX ORDER — ONDANSETRON 8 MG/1
4 TABLET, FILM COATED ORAL ONCE
Refills: 0 | Status: COMPLETED | OUTPATIENT
Start: 2019-06-01 | End: 2019-06-01

## 2019-06-01 RX ADMIN — ONDANSETRON 4 MILLIGRAM(S): 8 TABLET, FILM COATED ORAL at 18:36

## 2019-06-01 RX ADMIN — MORPHINE SULFATE 2 MILLIGRAM(S): 50 CAPSULE, EXTENDED RELEASE ORAL at 05:03

## 2019-06-01 RX ADMIN — TENOFOVIR DISOPROXIL FUMARATE 300 MILLIGRAM(S): 300 TABLET, FILM COATED ORAL at 12:15

## 2019-06-01 RX ADMIN — HEPARIN SODIUM 5000 UNIT(S): 5000 INJECTION INTRAVENOUS; SUBCUTANEOUS at 21:34

## 2019-06-01 RX ADMIN — HEPARIN SODIUM 5000 UNIT(S): 5000 INJECTION INTRAVENOUS; SUBCUTANEOUS at 05:03

## 2019-06-01 RX ADMIN — LIDOCAINE 1 PATCH: 4 CREAM TOPICAL at 05:13

## 2019-06-01 RX ADMIN — LIDOCAINE 1 PATCH: 4 CREAM TOPICAL at 06:55

## 2019-06-01 RX ADMIN — POTASSIUM PHOSPHATE, MONOBASIC POTASSIUM PHOSPHATE, DIBASIC 62.5 MILLIMOLE(S): 236; 224 INJECTION, SOLUTION INTRAVENOUS at 12:15

## 2019-06-01 RX ADMIN — Medication 1 DROP(S): at 19:43

## 2019-06-01 RX ADMIN — HEPARIN SODIUM 5000 UNIT(S): 5000 INJECTION INTRAVENOUS; SUBCUTANEOUS at 12:16

## 2019-06-01 RX ADMIN — MORPHINE SULFATE 4 MILLIGRAM(S): 50 CAPSULE, EXTENDED RELEASE ORAL at 12:30

## 2019-06-01 RX ADMIN — MORPHINE SULFATE 2 MILLIGRAM(S): 50 CAPSULE, EXTENDED RELEASE ORAL at 05:20

## 2019-06-01 RX ADMIN — ONDANSETRON 4 MILLIGRAM(S): 8 TABLET, FILM COATED ORAL at 03:14

## 2019-06-01 RX ADMIN — Medication 40 MILLIEQUIVALENT(S): at 12:16

## 2019-06-01 RX ADMIN — MORPHINE SULFATE 4 MILLIGRAM(S): 50 CAPSULE, EXTENDED RELEASE ORAL at 12:14

## 2019-06-01 RX ADMIN — AMLODIPINE BESYLATE 5 MILLIGRAM(S): 2.5 TABLET ORAL at 05:03

## 2019-06-01 RX ADMIN — SODIUM CHLORIDE 75 MILLILITER(S): 9 INJECTION, SOLUTION INTRAVENOUS at 21:34

## 2019-06-01 RX ADMIN — MORPHINE SULFATE 4 MILLIGRAM(S): 50 CAPSULE, EXTENDED RELEASE ORAL at 18:51

## 2019-06-01 RX ADMIN — MORPHINE SULFATE 4 MILLIGRAM(S): 50 CAPSULE, EXTENDED RELEASE ORAL at 18:36

## 2019-06-01 RX ADMIN — ONDANSETRON 4 MILLIGRAM(S): 8 TABLET, FILM COATED ORAL at 12:15

## 2019-06-01 RX ADMIN — LISINOPRIL 20 MILLIGRAM(S): 2.5 TABLET ORAL at 05:02

## 2019-06-01 NOTE — PROGRESS NOTE ADULT - SUBJECTIVE AND OBJECTIVE BOX
Patient is a 68y old  Female who presents with a chief complaint of abdominal pain (01 Jun 2019 09:09)      SUBJECTIVE / OVERNIGHT EVENTS: Still c/o left UQ pain. No other complaints    MEDICATIONS  (STANDING):  amLODIPine   Tablet 5 milliGRAM(s) Oral daily  heparin  Injectable 5000 Unit(s) SubCutaneous every 8 hours  lisinopril 20 milliGRAM(s) Oral daily  sodium chloride 0.9%. 1000 milliLiter(s) (100 mL/Hr) IV Continuous <Continuous>  tenofovir disoproxil fumarate (VIREAD) 300 milliGRAM(s) Oral daily    MEDICATIONS  (PRN):  acetaminophen   Tablet .. 650 milliGRAM(s) Oral every 6 hours PRN Moderate Pain (4 - 6)  ALBUTerol    90 MICROgram(s) HFA Inhaler 2 Puff(s) Inhalation every 6 hours PRN Shortness of Breath and/or Wheezing  lidocaine   Patch 1 Patch Transdermal every 24 hours PRN knee pain  morphine  - Injectable 2 milliGRAM(s) IV Push every 4 hours PRN Moderate Pain (4 - 6)  morphine  - Injectable 4 milliGRAM(s) IV Push every 4 hours PRN Severe Pain (7 - 10)  ondansetron Injectable 4 milliGRAM(s) IV Push every 6 hours PRN Nausea and/or Vomiting      Vital Signs Last 24 Hrs  T(C): 37.1 (01 Jun 2019 05:04), Max: 37.1 (01 Jun 2019 05:04)  T(F): 98.7 (01 Jun 2019 05:04), Max: 98.7 (01 Jun 2019 05:04)  HR: 67 (01 Jun 2019 05:04) (61 - 67)  BP: 168/81 (01 Jun 2019 05:04) (139/71 - 168/81)  BP(mean): --  RR: 18 (01 Jun 2019 05:04) (18 - 18)  SpO2: 100% (01 Jun 2019 05:04) (99% - 100%)  CAPILLARY BLOOD GLUCOSE        I&O's Summary      PHYSICAL EXAM:  GENERAL: NAD, well-developed  HEAD:  Atraumatic, Normocephalic  EYES: EOMI, PERRLA, conjunctiva and sclera clear  NECK: Supple, No JVD  CHEST/LUNG: Clear to auscultation bilaterally; No wheeze  HEART: Regular rate and rhythm; 2/6 systolic murmurs at LSB, no rubs, or gallops  ABDOMEN: Soft, mildly tender at LUQ, no guarding or rebound, Nondistended; Bowel sounds present  EXTREMITIES:  2+ Peripheral Pulses, No clubbing, cyanosis, or edema  PSYCH: AAOx3  NEUROLOGY: non-focal  SKIN: No rashes or lesions    LABS:                        12.2   6.19  )-----------( 204      ( 01 Jun 2019 06:05 )             37.4     06-01    134<L>  |  98  |  10  ----------------------------<  104<H>  3.3<L>   |  19<L>  |  0.53    Ca    7.3<L>      01 Jun 2019 06:05  Phos  1.7     06-01  Mg     2.1     06-01    TPro  6.7  /  Alb  4.1  /  TBili  0.6  /  DBili  x   /  AST  22  /  ALT  12  /  AlkPhos  43  06-01    PT/INR - ( 01 Jun 2019 06:05 )   PT: 12.7 SEC;   INR: 1.11          PTT - ( 01 Jun 2019 06:05 )  PTT:27.1 SEC          RADIOLOGY & ADDITIONAL TESTS:    Imaging Personally Reviewed:    Consultant(s) Notes Reviewed:      Care Discussed with Consultants/Other Providers:

## 2019-06-01 NOTE — CONSULT NOTE ADULT - SUBJECTIVE AND OBJECTIVE BOX
[facility] Ophthalmology Consult Note    HPI:  68 year old female with hx of GIST tumor (s/p resection in 2017, intermittently on gleevec -->now on tasigna), HTN, RA who presents with abdominal pain x 5 days. States that the pain is in her lower abdomen all throughout, cannot characterize but states 10/10 pain. States that it has come on and off in the past but resolved on its own and is now persistent. Reports associated nausea, and decreased PO intake but no vomiting. Was here yesterday and had prescribed oxycodone for pain which had helped. Last BM was 3 days ago with normal caliber stool. Denies any BRBPR or melena. Denies fevers, chills and states she feels thinner from not eating for the past 5 days. States that is afraid to eat for fear of worsening pain. No alcohol or NSAID use.    In terms of her GIST tumor: diagnosed in 2017 w/ resection of the GIST tumor. Patient was on gleevec on three separate occasions but had to stop due to edema. Had a surveillance scan in 2018 which had revealed a lesion in the inferior pubic ramus and hypodense liver lesion which was biopsied 10/2018 with path report: metastatic GIST tumor. As per review - - was recommended to go to [facility] by Dr. Gastelum but had never went. Now sent in by her medical oncologist for surg onc evaluation.     Patient has had blurry vision for few weeks. Concern by primary given Plaquenil use.  Blurry vision for few weeks improved with blinking, intermittent         PMH: As above  Meds:   · 	lisinopril 20 mg oral tablet: Last Dose Taken:  , 1 tab(s) orally once a day, am  · 	amLODIPine 5 mg oral tablet: Last Dose Taken:  , 1 tab(s) orally once a day, am  · 	hydroxychloroquine 200 mg oral tablet: Last Dose Taken:  , 1  orally 2 times a day  · 	Viread 300 mg oral tablet: Last Dose Taken:  , 1 tab(s) orally once a day  · 	Ventolin HFA 90 mcg/inh inhalation aerosol: Last Dose Taken:  , 2 puff(s) inhaled 4 times a day  · 	Tasigna: Last Dose Taken:    POcHx (including surgeries/lasers/trauma):  None  Drops: None  FamHx: None  Social Hx: None  Allergies: NKDA    ROS:  General (neg), Vision (per HPI), Head and Neck (neg), Pulm (neg), CV (neg), GI (neg),  (neg), Musculoskeletal (neg), Skin/Integ (neg), Neuro (neg), Endocrine (neg), Heme (neg), All/Immuno (neg)    Mood and Affect Appropriate ( x ),  Oriented to Time, Place, and Person x 3 ( x )    Ophthalmology Exam    Visual acuity (sc): 20/30 OU  Pupils: PERRL OU, no APD  Ttono: 16 OU  Extraocular movements (EOMs): Full OU, no pain, no diplopia   Confrontational Visual Field (CVF):  Full OU  Color Plates: 12/12 OU    Pen Light Exam (PLE)  External:  Flat OU  Lids/Lashes/Lacrimal Ducts: Flat OU    Sclera/Conjunctiva:  W+Q OU  Cornea: spk, arcus OU  Anterior Chamber: D+F OU  Iris:  Flat OU  Lens:  Cl OU    Fundus Exam: dilated with 1% tropicamide and 2.5% phenylephrine  Approval obtained from primary team for dilation  Patient aware that pupils can remained dilated for at least 4-6 hours  Exam performed with 20D lens    Vitreous: wnl OU  Disc, cup/disc: sharp and pink, 0.4 OU  Macula:  Pigmentary changes OU  Vessels:  wnl OU  Periphery: wnl OU    Assessment/Plan:  68 year old woman w/ PMH sig for metastatic GIST who presents with abdominal pain that worsened since her last visit 2 days ago. Blurry vision for few weeks improved with blinking, intermittent   - Unlikely 2/2 Plaquenil use, however given duration of use (5 yrs) patient should be referred to outpatient ophtho for OCT mac and HVF 10-2  - Current symptoms 2/2 dry eye OU  - Start artificial tears QID OU  - Follow-Up:  Patient should follow up his/her ophthalmologist or in the [facility] within 1 week of discharge.  [address] [place] [address]  [phone]

## 2019-06-01 NOTE — CONSULT NOTE ADULT - SUBJECTIVE AND OBJECTIVE BOX
[facility] General Surgery Consultation     Patient is a 68y old  Female who presents with a chief complaint of abdominal pain (31 May 2019 20:04)      HPI:  This is a 68 year old female with hx of GIST tumor (s/p resection in 2017, intermittently on gleevec -->now on tasigna), HTN, RA who presents with abdominal pain x 5 days. States that the pain is in her lower abdomen all throughout, cannot characterize but states 10/10 pain. States that it has come on and off in the past but resolved on its own and is now persistent. Reports associated nausea, and decreased PO intake but no vomiting. Was here yesterday and had prescribed oxycodone for pain which had helped. Last BM was 3 days ago with normal caliber stool. Denies any BRBPR or melena. Denies fevers, chills and states she feels thinner from not eating for the past 5 days. States that is afraid to eat for fear of worsening pain. No alcohol or NSAID use.    In terms of her GIST tumor: diagnosed in 2017 w/ resection of the GIST tumor. Patient was on gleevec on three separate occasions but had to stop due to edema. Had a surveillance scan in 2018 which had revealed a lesion in the inferior pubic ramus and hypodense liver lesion which was biopsied 10/2018 with path report: metastatic GIST tumor. As per review - - [place] [facility] [place] Gastelum but had never went. Now sent in by her medical oncologist for surg onc evaluation.     In the ED:  Afebrile HR 61-67 -160/59-71 RR 18 O2:  RA  Given Morphine 4mg x 1, Zofran 4mg x 1, NS 1 L bolus (31 May 2019 20:04)      PAST MEDICAL & SURGICAL HISTORY:  Anemia  Thyroid nodule  Stomach cancer  Obese  RA (rheumatoid arthritis)  Hypertension  H/O laparoscopy  H/O myomectomy: 1990&#x27;s      ROS: 10-point review of systems   FAMILY HISTORY:  No pertinent family history in first degree relatives      SOCIAL HISTORY:    MEDICATIONS  (STANDING):  amLODIPine   Tablet 5 milliGRAM(s) Oral daily  heparin  Injectable 5000 Unit(s) SubCutaneous every 8 hours  lisinopril 20 milliGRAM(s) Oral daily  sodium chloride 0.9%. 1000 milliLiter(s) (100 mL/Hr) IV Continuous <Continuous>  tenofovir disoproxil fumarate (VIREAD) 300 milliGRAM(s) Oral daily    MEDICATIONS  (PRN):  acetaminophen   Tablet .. 650 milliGRAM(s) Oral every 6 hours PRN Moderate Pain (4 - 6)  ALBUTerol    90 MICROgram(s) HFA Inhaler 2 Puff(s) Inhalation every 6 hours PRN Shortness of Breath and/or Wheezing  lidocaine   Patch 1 Patch Transdermal every 24 hours PRN knee pain  morphine  - Injectable 2 milliGRAM(s) IV Push every 4 hours PRN Moderate Pain (4 - 6)  morphine  - Injectable 4 milliGRAM(s) IV Push every 4 hours PRN Severe Pain (7 - 10)  ondansetron Injectable 4 milliGRAM(s) IV Push every 6 hours PRN Nausea and/or Vomiting    Allergies    Bananas (Pruritus)  Kiwi (Pruritus)  No Known Drug Allergies  Seafood (Pruritus)  strawberry (Anaphylaxis)    Intolerances    Vital Signs Last 24 Hrs  T(C): 37.1 (01 Jun 2019 05:04), Max: 37.1 (01 Jun 2019 05:04)  T(F): 98.7 (01 Jun 2019 05:04), Max: 98.7 (01 Jun 2019 05:04)  HR: 67 (01 Jun 2019 05:04) (61 - 67)  BP: 168/81 (01 Jun 2019 05:04) (139/71 - 168/81)  BP(mean): --  RR: 18 (01 Jun 2019 05:04) (18 - 18)  SpO2: 100% (01 Jun 2019 05:04) (99% - 100%)    Physical Exam:  General: Well developed, well nourished, No Acute Distress  HEENT: Normocephalic Atraumatic, EOMI bl  Neurology: A&Ox3  Abdominal: Soft, Tender to palpation and percussion on LUQ, and mild tenderness to palpation/percussion on RLQ, Non-Distended, no rebound, no guarding  Extremities: No Clubbing, cyanosis or edema, FROM  Skin: warm, dry, normal color, no rash or abnormal lesions    LABS:             13.0   6.72  )-----------( 214      ( 31 May 2019 16:16 )             40.6     05-31    134<L>  |  98  |  14  ----------------------------<  111<H>  3.9   |  18<L>  |  0.58    Ca    7.9<L>      31 May 2019 16:16    TPro  7.3  /  Alb  4.4  /  TBili  0.7  /  DBili  x   /  AST  25  /  ALT  14  /  AlkPhos  46  05-31    PT/INR - ( 31 May 2019 16:16 )   PT: 12.3 SEC;   INR: 1.10          PTT - ( 31 May 2019 16:16 )  PTT:26.6 SEC      Radiographic Findings:     < from: CT Angio Chest w/ IV Cont (05.29.19 @ 22:36) >  FINDINGS:    CHEST:     LUNGS AND LARGE AIRWAYS: Patent central airways. No pulmonary nodules.  PLEURA: No pleural effusion.  VESSELS: Atherosclerotic changes. Study is nondiagnostic for evaluation   of the pulmonary arteries due to poor opacification.  HEART: Heart size is normal. No pericardial effusion.  MEDIASTINUM AND ENRIQUE: No lymphadenopathy.  CHEST WALL AND LOWER NECK: Multiple heterogeneous thyroid nodules.    ABDOMEN AND PELVIS:    LIVER: Indeterminate hypodense lesion measuring 2.3 cm in the right   hepatic lobe (2:141).   BILE DUCTS: Normal caliber.  GALLBLADDER: Within normal limits.  SPLEEN: Within normal limits.  PANCREAS: Within normal limits.  ADRENALS: Within normal limits.  KIDNEYS/URETERS: No hydronephrosis.    BLADDER: Within normal limits.  REPRODUCTIVE ORGANS: Calcified uterine fibroid. Bilateral adnexa within   normal limits.    BOWEL: No bowel obstruction. Scattered colonic diverticuli. Appendix not   visualized. There is an exophytic mass measuring 5.6 x 4.6 x 5 cm in the   gastrohepatic space. It is unclear whether this is exophytic from the   left hepatic lobe or arising from the wall of the adjacent stomach..  PERITONEUM: No ascites.  VESSELS:  Atherosclerotic changes.  RETROPERITONEUM: No lymphadenopathy.    ABDOMINAL WALL: Within normal limits.  BONES: Degenerative changes.    IMPRESSION:     Study is nondiagnostic for evaluation for pulmonary emboli.    Solid mass in the gastrohepatic space demonstrates an interval increase   since prior study of 8/16/2018. It is unclear if this is exophytic from   the adjacent liver or arising from the gastric wall. Endoscopic   ultrasound is recommended.    An indeterminant hypodense lesion measuring 2.3 cm in the right hepatic   lobe, demonstrating interval increase in size since 8/16/2018. Primary   consideration is hepatic metastasis. Consider PET scan correlation.    < end of copied text >

## 2019-06-01 NOTE — PROGRESS NOTE ADULT - PROBLEM SELECTOR PLAN 4
Essential hypertension  - c/w home regimen antihypertensives: amlodipine and lisinopril - patient with prior hep B core Ab reactive and Surface Ab positive suggestive of chronic infection  - will restart viread as per home regimen given that patient is on TKI

## 2019-06-01 NOTE — PROGRESS NOTE ADULT - PROBLEM SELECTOR PLAN 5
- hx of RA with left knee pain on plaquenil  - given hx of vision blurriness for the past several months - - will hold off on restarting plaquenil  - opthalmology consult prior to reinitiation Essential hypertension  - c/w home regimen antihypertensives: amlodipine and lisinopril

## 2019-06-01 NOTE — CONSULT NOTE ADULT - SUBJECTIVE AND OBJECTIVE BOX
HPI:  This is a 68 year old female with hx of hep B on viread, GIST tumor (s/p resection in 2017, intermittently on gleevec -->now on tasigna for the past 9 mos), HTN, RA who presents with abdominal pain x 5 days.     States that the pain is in her lower abdomen all throughout, cannot characterize but states 10/10 pain. States that it has come on and off in the past but resolved on its own and is now persistent. Reports associated nausea, and decreased PO intake but no vomiting. Was here yesterday and had prescribed oxycodone for pain which had helped. Last BM was 3 days ago with normal caliber stool. Denies any BRBPR or melena. Denies fevers, chills and states she feels thinner from not eating for the past 5 days. States that is afraid to eat for fear of worsening pain. No alcohol or NSAID use.    In terms of her GIST tumor: diagnosed in 2017 w/ resection of the GIST tumor. Patient was on gleevec on three separate occasions but had to stop due to edema. Had a surveillance scan in 2018 which had revealed a lesion in the inferior pubic ramus and hypodense liver lesion which was biopsied 10/2018 with path report: metastatic GIST tumor. As per review - - [place] [facility] [place]ast[place]um but had never went. Now sent in by her medical oncologist for surg onc evaluation. Inpatient CT A/P showed mass in the gastrohepatic space increased and liver lesion increased.    Allergies  Bananas (Pruritus)  Kiwi (Pruritus)  No Known Drug Allergies  Seafood (Pruritus)  strawberry (Anaphylaxis)    Intolerances    MEDICATIONS  (STANDING):  amLODIPine   Tablet 5 milliGRAM(s) Oral daily  artificial tears (preservative free) Ophthalmic Solution 1 Drop(s) Both EYES four times a day  heparin  Injectable 5000 Unit(s) SubCutaneous every 8 hours  lisinopril 20 milliGRAM(s) Oral daily  sodium chloride 0.9%. 1000 milliLiter(s) (100 mL/Hr) IV Continuous <Continuous>  tenofovir disoproxil fumarate (VIREAD) 300 milliGRAM(s) Oral daily    MEDICATIONS  (PRN):  acetaminophen   Tablet .. 650 milliGRAM(s) Oral every 6 hours PRN Moderate Pain (4 - 6)  ALBUTerol    90 MICROgram(s) HFA Inhaler 2 Puff(s) Inhalation every 6 hours PRN Shortness of Breath and/or Wheezing  lidocaine   Patch 1 Patch Transdermal every 24 hours PRN knee pain  morphine  - Injectable 2 milliGRAM(s) IV Push every 4 hours PRN Moderate Pain (4 - 6)  morphine  - Injectable 4 milliGRAM(s) IV Push every 4 hours PRN Severe Pain (7 - 10)  ondansetron Injectable 4 milliGRAM(s) IV Push every 6 hours PRN Nausea and/or Vomiting      PAST MEDICAL & SURGICAL HISTORY:  Anemia  Thyroid nodule  Stomach cancer  Obese  RA (rheumatoid arthritis)  Hypertension  H/O laparoscopy  H/O myomectomy: 1990    FAMILY HISTORY:  No pertinent family history in first degree relatives    SOCIAL HISTORY: No EtOH, no tobacco    REVIEW OF SYSTEMS:  All other review of systems is negative unless indicated above.    Height (cm): 165.1 (05-31 @ 18:46)  Weight (kg): 72.9 (05-31 @ 18:46)  BMI (kg/m2): 26.7 (05-31 @ 18:46)  BSA (m2): 1.8 (05-31 @ 18:46)    T(F): 99 (06-01-19 @ 14:49), Max: 99 (06-01-19 @ 14:49)  HR: 61 (06-01-19 @ 14:49)  BP: 153/74 (06-01-19 @ 14:49)  RR: 18 (06-01-19 @ 14:49)  SpO2: 100% (06-01-19 @ 14:49)  Wt(kg): --    GENERAL: NAD, well-developed  HEAD:  Atraumatic, Normocephalic  EYES: EOMI, PERRLA, conjunctiva and sclera clear  NECK: Supple, No JVD  CHEST/LUNG: Clear to auscultation bilaterally; No wheeze  HEART: Regular rate and rhythm; No murmurs, rubs, or gallops  ABDOMEN: Soft, Nontender, Nondistended; Bowel sounds present  EXTREMITIES:  2+ Peripheral Pulses, No clubbing, cyanosis, or edema  NEUROLOGY: non-focal  SKIN: No rashes or lesions                          12.2   6.19  )-----------( 204      ( 01 Jun 2019 06:05 )             37.4       06-01    134<L>  |  98  |  10  ----------------------------<  104<H>  3.3<L>   |  19<L>  |  0.53    Ca    7.3<L>      01 Jun 2019 06:05  Phos  1.7     06-01  Mg     2.1     06-01    TPro  6.7  /  Alb  4.1  /  TBili  0.6  /  DBili  x   /  AST  22  /  ALT  12  /  AlkPhos  43  06-01      Phosphorus Level, Serum: 1.7 mg/dL (06-01 @ 06:05)  Magnesium, Serum: 2.1 mg/dL (06-01 @ 06:05)

## 2019-06-01 NOTE — PROGRESS NOTE ADULT - PROBLEM SELECTOR PLAN 1
- pt presented with 5 days of left lower abdominal pain with no clinical signs of infection or obstruction, suggestive of exophytic mass seen on CT as likely cause  - will consult surgical oncology, previously followed with [facility] with loss to follow up   - if no surgical intervention offer, will need to consider endoscopic intervention with GI   - MIVF, Full liquid diet and NPO after midnight at this time  - recent (+)UA/Cx with GBS, patient asymptomatic, repeat UA pending

## 2019-06-01 NOTE — PROGRESS NOTE ADULT - PROBLEM SELECTOR PLAN 3
- patient with prior hep B core Ab reactive and Surface Ab positive suggestive of chronic infection  - will restart viread as per home regimen given that patient is on TKI GIST metastatic to liver  - hx of GIST diagnosed in 2017 s/p resection and previously on Gleevec which was discontinued 2/2 edema, now on tasigna with CT scans indicative of worsening metastatic disease   - Onc Consult in the morning   - Surg Onc consult as above for evaluation  - previously hepatitis serologies with both hep B core and surface antibodies positive, will continue with viread as per home regimen given that patient is on TKI

## 2019-06-01 NOTE — PROGRESS NOTE ADULT - PROBLEM SELECTOR PLAN 6
DVT PPx: HSQ in anticipation of possible surgical intervention    Rowena Kathleen  PGY2  [phone] - hx of RA with left knee pain on plaquenil  - given hx of vision blurriness for the past several months - - will hold off on restarting plaquenil  - opthalmology consult prior to reinitiation

## 2019-06-01 NOTE — PROGRESS NOTE ADULT - PROBLEM SELECTOR PLAN 2
GIST metastatic to liver  - hx of GIST diagnosed in 2017 s/p resection and previously on Gleevec which was discontinued 2/2 edema, now on tasigna with CT scans indicative of worsening metastatic disease   - Onc Consult in the morning   - Surg Onc consult as above for evaluation  - previously hepatitis serologies with both hep B core and surface antibodies positive, will continue with viread as per home regimen given that patient is on TKI (+) hypophosphatemia and hypokalemia.  Will repleate Potassium and phos  Will check BMP and phos in AM

## 2019-06-01 NOTE — CONSULT NOTE ADULT - ATTENDING COMMENTS
68 year old female with hx of hep B on viread, GIST tumor (s/p resection in 2017, intermittently on gleevec -->now on tasigna for the past 9 mos), HTN, RA who presents with abdominal pain x 5 days with POD on scans. She is followed by Dr. Tolbert in [place] who has referred her for evaluation by Dr. Montejo. However medical therapy is the mainstay for GIST and as she has progressed through two lines of therapy it would be advisable for her to seek care at a center where she could go on a clinical trial for advanced refractory GIST.  Per patient Dr. Tolbert was going to refer [facility] but she has not made an appt to date. She was encouraged to start the process.

## 2019-06-02 LAB
ALBUMIN SERPL ELPH-MCNC: 4 G/DL — SIGNIFICANT CHANGE UP (ref 3.3–5)
ALP SERPL-CCNC: 42 U/L — SIGNIFICANT CHANGE UP (ref 40–120)
ALT FLD-CCNC: 12 U/L — SIGNIFICANT CHANGE UP (ref 4–33)
ANION GAP SERPL CALC-SCNC: 14 MMO/L — SIGNIFICANT CHANGE UP (ref 7–14)
AST SERPL-CCNC: 17 U/L — SIGNIFICANT CHANGE UP (ref 4–32)
BACTERIA UR CULT: SIGNIFICANT CHANGE UP
BASOPHILS # BLD AUTO: 0.01 K/UL — SIGNIFICANT CHANGE UP (ref 0–0.2)
BASOPHILS NFR BLD AUTO: 0.2 % — SIGNIFICANT CHANGE UP (ref 0–2)
BILIRUB SERPL-MCNC: 0.6 MG/DL — SIGNIFICANT CHANGE UP (ref 0.2–1.2)
BUN SERPL-MCNC: 9 MG/DL — SIGNIFICANT CHANGE UP (ref 7–23)
CALCIUM SERPL-MCNC: 6.7 MG/DL — LOW (ref 8.4–10.5)
CHLORIDE SERPL-SCNC: 98 MMOL/L — SIGNIFICANT CHANGE UP (ref 98–107)
CO2 SERPL-SCNC: 23 MMOL/L — SIGNIFICANT CHANGE UP (ref 22–31)
CREAT SERPL-MCNC: 0.58 MG/DL — SIGNIFICANT CHANGE UP (ref 0.5–1.3)
EOSINOPHIL # BLD AUTO: 0.04 K/UL — SIGNIFICANT CHANGE UP (ref 0–0.5)
EOSINOPHIL NFR BLD AUTO: 0.8 % — SIGNIFICANT CHANGE UP (ref 0–6)
GLUCOSE SERPL-MCNC: 112 MG/DL — HIGH (ref 70–99)
HCT VFR BLD CALC: 38.8 % — SIGNIFICANT CHANGE UP (ref 34.5–45)
HGB BLD-MCNC: 12.7 G/DL — SIGNIFICANT CHANGE UP (ref 11.5–15.5)
IMM GRANULOCYTES NFR BLD AUTO: 0.2 % — SIGNIFICANT CHANGE UP (ref 0–1.5)
LYMPHOCYTES # BLD AUTO: 1.2 K/UL — SIGNIFICANT CHANGE UP (ref 1–3.3)
LYMPHOCYTES # BLD AUTO: 25.1 % — SIGNIFICANT CHANGE UP (ref 13–44)
MAGNESIUM SERPL-MCNC: 2.2 MG/DL — SIGNIFICANT CHANGE UP (ref 1.6–2.6)
MCHC RBC-ENTMCNC: 25.9 PG — LOW (ref 27–34)
MCHC RBC-ENTMCNC: 32.7 % — SIGNIFICANT CHANGE UP (ref 32–36)
MCV RBC AUTO: 79.2 FL — LOW (ref 80–100)
MONOCYTES # BLD AUTO: 0.57 K/UL — SIGNIFICANT CHANGE UP (ref 0–0.9)
MONOCYTES NFR BLD AUTO: 11.9 % — SIGNIFICANT CHANGE UP (ref 2–14)
NEUTROPHILS # BLD AUTO: 2.96 K/UL — SIGNIFICANT CHANGE UP (ref 1.8–7.4)
NEUTROPHILS NFR BLD AUTO: 61.8 % — SIGNIFICANT CHANGE UP (ref 43–77)
NRBC # FLD: 0 K/UL — SIGNIFICANT CHANGE UP (ref 0–0)
PHOSPHATE SERPL-MCNC: 1.5 MG/DL — LOW (ref 2.5–4.5)
PLATELET # BLD AUTO: 202 K/UL — SIGNIFICANT CHANGE UP (ref 150–400)
PMV BLD: 10.7 FL — SIGNIFICANT CHANGE UP (ref 7–13)
POTASSIUM SERPL-MCNC: 3.1 MMOL/L — LOW (ref 3.5–5.3)
POTASSIUM SERPL-SCNC: 3.1 MMOL/L — LOW (ref 3.5–5.3)
PROT SERPL-MCNC: 6.5 G/DL — SIGNIFICANT CHANGE UP (ref 6–8.3)
RBC # BLD: 4.9 M/UL — SIGNIFICANT CHANGE UP (ref 3.8–5.2)
RBC # FLD: 13.7 % — SIGNIFICANT CHANGE UP (ref 10.3–14.5)
SODIUM SERPL-SCNC: 135 MMOL/L — SIGNIFICANT CHANGE UP (ref 135–145)
WBC # BLD: 4.79 K/UL — SIGNIFICANT CHANGE UP (ref 3.8–10.5)
WBC # FLD AUTO: 4.79 K/UL — SIGNIFICANT CHANGE UP (ref 3.8–10.5)

## 2019-06-02 PROCEDURE — 99233 SBSQ HOSP IP/OBS HIGH 50: CPT

## 2019-06-02 RX ORDER — POTASSIUM CHLORIDE 20 MEQ
10 PACKET (EA) ORAL
Refills: 0 | Status: COMPLETED | OUTPATIENT
Start: 2019-06-02 | End: 2019-06-03

## 2019-06-02 RX ORDER — LIDOCAINE 4 G/100G
1 CREAM TOPICAL EVERY 24 HOURS
Refills: 0 | Status: DISCONTINUED | OUTPATIENT
Start: 2019-06-02 | End: 2019-06-12

## 2019-06-02 RX ORDER — SENNA PLUS 8.6 MG/1
2 TABLET ORAL AT BEDTIME
Refills: 0 | Status: DISCONTINUED | OUTPATIENT
Start: 2019-06-02 | End: 2019-06-12

## 2019-06-02 RX ORDER — POTASSIUM PHOSPHATE, MONOBASIC POTASSIUM PHOSPHATE, DIBASIC 236; 224 MG/ML; MG/ML
15 INJECTION, SOLUTION INTRAVENOUS ONCE
Refills: 0 | Status: COMPLETED | OUTPATIENT
Start: 2019-06-02 | End: 2019-06-02

## 2019-06-02 RX ORDER — DOCUSATE SODIUM 100 MG
100 CAPSULE ORAL THREE TIMES A DAY
Refills: 0 | Status: DISCONTINUED | OUTPATIENT
Start: 2019-06-02 | End: 2019-06-12

## 2019-06-02 RX ORDER — POTASSIUM CHLORIDE 20 MEQ
40 PACKET (EA) ORAL EVERY 4 HOURS
Refills: 0 | Status: COMPLETED | OUTPATIENT
Start: 2019-06-02 | End: 2019-06-02

## 2019-06-02 RX ADMIN — LIDOCAINE 1 PATCH: 4 CREAM TOPICAL at 23:53

## 2019-06-02 RX ADMIN — AMLODIPINE BESYLATE 5 MILLIGRAM(S): 2.5 TABLET ORAL at 06:18

## 2019-06-02 RX ADMIN — MORPHINE SULFATE 4 MILLIGRAM(S): 50 CAPSULE, EXTENDED RELEASE ORAL at 04:03

## 2019-06-02 RX ADMIN — MORPHINE SULFATE 4 MILLIGRAM(S): 50 CAPSULE, EXTENDED RELEASE ORAL at 19:00

## 2019-06-02 RX ADMIN — LIDOCAINE 1 PATCH: 4 CREAM TOPICAL at 06:21

## 2019-06-02 RX ADMIN — TENOFOVIR DISOPROXIL FUMARATE 300 MILLIGRAM(S): 300 TABLET, FILM COATED ORAL at 11:50

## 2019-06-02 RX ADMIN — LIDOCAINE 1 PATCH: 4 CREAM TOPICAL at 12:51

## 2019-06-02 RX ADMIN — MORPHINE SULFATE 4 MILLIGRAM(S): 50 CAPSULE, EXTENDED RELEASE ORAL at 03:48

## 2019-06-02 RX ADMIN — LISINOPRIL 20 MILLIGRAM(S): 2.5 TABLET ORAL at 06:18

## 2019-06-02 RX ADMIN — LIDOCAINE 1 PATCH: 4 CREAM TOPICAL at 01:36

## 2019-06-02 RX ADMIN — POTASSIUM PHOSPHATE, MONOBASIC POTASSIUM PHOSPHATE, DIBASIC 62.5 MILLIMOLE(S): 236; 224 INJECTION, SOLUTION INTRAVENOUS at 18:35

## 2019-06-02 RX ADMIN — Medication 100 MILLIGRAM(S): at 23:49

## 2019-06-02 RX ADMIN — MORPHINE SULFATE 4 MILLIGRAM(S): 50 CAPSULE, EXTENDED RELEASE ORAL at 14:59

## 2019-06-02 RX ADMIN — ONDANSETRON 4 MILLIGRAM(S): 8 TABLET, FILM COATED ORAL at 03:48

## 2019-06-02 RX ADMIN — Medication 40 MILLIEQUIVALENT(S): at 22:26

## 2019-06-02 RX ADMIN — LIDOCAINE 1 PATCH: 4 CREAM TOPICAL at 23:52

## 2019-06-02 RX ADMIN — Medication 40 MILLIEQUIVALENT(S): at 18:34

## 2019-06-02 RX ADMIN — MORPHINE SULFATE 4 MILLIGRAM(S): 50 CAPSULE, EXTENDED RELEASE ORAL at 18:34

## 2019-06-02 RX ADMIN — SENNA PLUS 2 TABLET(S): 8.6 TABLET ORAL at 23:49

## 2019-06-02 RX ADMIN — Medication 1 DROP(S): at 06:19

## 2019-06-02 RX ADMIN — LIDOCAINE 1 PATCH: 4 CREAM TOPICAL at 01:37

## 2019-06-02 RX ADMIN — HEPARIN SODIUM 5000 UNIT(S): 5000 INJECTION INTRAVENOUS; SUBCUTANEOUS at 06:19

## 2019-06-02 RX ADMIN — Medication 1 DROP(S): at 23:50

## 2019-06-02 RX ADMIN — HEPARIN SODIUM 5000 UNIT(S): 5000 INJECTION INTRAVENOUS; SUBCUTANEOUS at 14:34

## 2019-06-02 RX ADMIN — Medication 100 MILLIEQUIVALENT(S): at 23:00

## 2019-06-02 RX ADMIN — ONDANSETRON 4 MILLIGRAM(S): 8 TABLET, FILM COATED ORAL at 14:44

## 2019-06-02 RX ADMIN — Medication 650 MILLIGRAM(S): at 23:49

## 2019-06-02 RX ADMIN — LIDOCAINE 1 PATCH: 4 CREAM TOPICAL at 06:22

## 2019-06-02 RX ADMIN — Medication 1 DROP(S): at 11:49

## 2019-06-02 RX ADMIN — MORPHINE SULFATE 4 MILLIGRAM(S): 50 CAPSULE, EXTENDED RELEASE ORAL at 14:34

## 2019-06-02 RX ADMIN — HEPARIN SODIUM 5000 UNIT(S): 5000 INJECTION INTRAVENOUS; SUBCUTANEOUS at 22:25

## 2019-06-02 NOTE — PROGRESS NOTE ADULT - PROBLEM SELECTOR PLAN 4
- patient with prior hep B core Ab reactive and Surface Ab positive   - will restart viread as per home regimen given that patient is on TKI

## 2019-06-02 NOTE — PROGRESS NOTE ADULT - PROBLEM SELECTOR PLAN 1
- pt presented with 5 days of left lower abdominal pain with no clinical signs of infection or obstruction, suggestive of exophytic mass seen on CT as likely cause  -Onc consult appreciated, rec to have medical treatment with her Oncologist  - Awaiting surgical oncology ,Dr Montejo to see pt.   - if no surgical intervention offered, likely home with her oncologist f/u and [facility] [place] [facility] for 2nd opinion and ? clinical trial as per Onc consult rec.  - advance diet as tolerated  - recent (+)UA/Cx with GBS, patient asymptomatic, repeat UA pending

## 2019-06-02 NOTE — PROGRESS NOTE ADULT - PROBLEM SELECTOR PLAN 3
GIST metastatic to liver  - hx of GIST diagnosed in 2017 s/p resection and previously on Gleevec which was discontinued 2/2 edema, now on tasigna with CT scans indicative of worsening metastatic disease   - Onc Consult input appreciated  - Surg Onc consult as above for evaluation  - previously hepatitis serologies with both hep B core and surface antibodies positive, will continue with viread as per home regimen given that patient is on TKI

## 2019-06-02 NOTE — PROGRESS NOTE ADULT - PROBLEM SELECTOR PLAN 2
(+) hypophosphatemia and hypokalemia.  Will replete Potassium and phos  Will check BMP and phos in AM

## 2019-06-02 NOTE — PROGRESS NOTE ADULT - PROBLEM SELECTOR PLAN 6
- hx of RA with left knee pain on plaquenil  - given hx of vision blurriness for the past several months - - will hold off on restarting plaquenil  - opthalmology consult appreciated. unlikely due to Plaquenil  -Outpatient opthal f/u

## 2019-06-02 NOTE — PROGRESS NOTE ADULT - SUBJECTIVE AND OBJECTIVE BOX
Patient is a 68y old  Female who presents with a chief complaint of abdominal pain (01 Jun 2019 18:19)      SUBJECTIVE / OVERNIGHT EVENTS: Feels better. No new complaints. Pain controlled with medication.     MEDICATIONS  (STANDING):  amLODIPine   Tablet 5 milliGRAM(s) Oral daily  artificial tears (preservative free) Ophthalmic Solution 1 Drop(s) Both EYES four times a day  dextrose 5% + sodium chloride 0.45%. 1000 milliLiter(s) (75 mL/Hr) IV Continuous <Continuous>  heparin  Injectable 5000 Unit(s) SubCutaneous every 8 hours  lisinopril 20 milliGRAM(s) Oral daily  tenofovir disoproxil fumarate (VIREAD) 300 milliGRAM(s) Oral daily    MEDICATIONS  (PRN):  acetaminophen   Tablet .. 650 milliGRAM(s) Oral every 6 hours PRN Moderate Pain (4 - 6)  ALBUTerol    90 MICROgram(s) HFA Inhaler 2 Puff(s) Inhalation every 6 hours PRN Shortness of Breath and/or Wheezing  lidocaine   Patch 1 Patch Transdermal every 24 hours PRN Right knee pain  lidocaine   Patch 1 Patch Transdermal every 24 hours PRN Left knee pain  morphine  - Injectable 2 milliGRAM(s) IV Push every 4 hours PRN Moderate Pain (4 - 6)  morphine  - Injectable 4 milliGRAM(s) IV Push every 4 hours PRN Severe Pain (7 - 10)  ondansetron Injectable 4 milliGRAM(s) IV Push every 6 hours PRN Nausea and/or Vomiting      Vital Signs Last 24 Hrs  T(C): 37.1 (02 Jun 2019 05:33), Max: 37.2 (01 Jun 2019 14:49)  T(F): 98.7 (02 Jun 2019 05:33), Max: 99 (01 Jun 2019 14:49)  HR: 54 (02 Jun 2019 05:33) (54 - 61)  BP: 138/58 (02 Jun 2019 05:33) (138/58 - 153/74)  BP(mean): --  RR: 18 (02 Jun 2019 05:33) (18 - 18)  SpO2: 100% (02 Jun 2019 05:33) (100% - 100%)  CAPILLARY BLOOD GLUCOSE        I&O's Summary      PHYSICAL EXAM:  GENERAL: NAD, well-developed  HEAD:  Atraumatic, Normocephalic  EYES: EOMI, PERRLA, conjunctiva and sclera clear  NECK: Supple, No JVD  CHEST/LUNG: Clear to auscultation bilaterally; No wheeze  HEART: Regular rate and rhythm; No murmurs, rubs, or gallops  ABDOMEN: Soft, mildly tender at epigastric region, no guarding or rebound, Nondistended; Bowel sounds present  EXTREMITIES:  2+ Peripheral Pulses, No clubbing, cyanosis, or edema  PSYCH: AAOx3  NEUROLOGY: non-focal  SKIN: No rashes or lesions    LABS:                        12.2   6.19  )-----------( 204      ( 01 Jun 2019 06:05 )             37.4     06-01    134<L>  |  98  |  10  ----------------------------<  104<H>  3.3<L>   |  19<L>  |  0.53    Ca    7.3<L>      01 Jun 2019 06:05  Phos  1.7     06-01  Mg     2.1     06-01    TPro  6.7  /  Alb  4.1  /  TBili  0.6  /  DBili  x   /  AST  22  /  ALT  12  /  AlkPhos  43  06-01    PT/INR - ( 01 Jun 2019 06:05 )   PT: 12.7 SEC;   INR: 1.11          PTT - ( 01 Jun 2019 06:05 )  PTT:27.1 SEC          RADIOLOGY & ADDITIONAL TESTS:    Imaging Personally Reviewed:    Consultant(s) Notes Reviewed:  Oncology    Care Discussed with Consultants/Other Providers:

## 2019-06-03 ENCOUNTER — APPOINTMENT (OUTPATIENT)
Dept: VASCULAR SURGERY | Facility: CLINIC | Age: 69
End: 2019-06-03

## 2019-06-03 LAB
ANION GAP SERPL CALC-SCNC: 12 MMO/L — SIGNIFICANT CHANGE UP (ref 7–14)
BUN SERPL-MCNC: 9 MG/DL — SIGNIFICANT CHANGE UP (ref 7–23)
CALCIUM SERPL-MCNC: 8.1 MG/DL — LOW (ref 8.4–10.5)
CHLORIDE SERPL-SCNC: 101 MMOL/L — SIGNIFICANT CHANGE UP (ref 98–107)
CO2 SERPL-SCNC: 22 MMOL/L — SIGNIFICANT CHANGE UP (ref 22–31)
CREAT SERPL-MCNC: 0.52 MG/DL — SIGNIFICANT CHANGE UP (ref 0.5–1.3)
GLUCOSE SERPL-MCNC: 105 MG/DL — HIGH (ref 70–99)
MAGNESIUM SERPL-MCNC: 2.1 MG/DL — SIGNIFICANT CHANGE UP (ref 1.6–2.6)
PHOSPHATE SERPL-MCNC: 2.2 MG/DL — LOW (ref 2.5–4.5)
POTASSIUM SERPL-MCNC: 4.3 MMOL/L — SIGNIFICANT CHANGE UP (ref 3.5–5.3)
POTASSIUM SERPL-SCNC: 4.3 MMOL/L — SIGNIFICANT CHANGE UP (ref 3.5–5.3)
SODIUM SERPL-SCNC: 135 MMOL/L — SIGNIFICANT CHANGE UP (ref 135–145)

## 2019-06-03 PROCEDURE — 99233 SBSQ HOSP IP/OBS HIGH 50: CPT

## 2019-06-03 PROCEDURE — 99232 SBSQ HOSP IP/OBS MODERATE 35: CPT

## 2019-06-03 RX ORDER — POLYETHYLENE GLYCOL 3350 17 G/17G
17 POWDER, FOR SOLUTION ORAL DAILY
Refills: 0 | Status: DISCONTINUED | OUTPATIENT
Start: 2019-06-03 | End: 2019-06-12

## 2019-06-03 RX ORDER — SODIUM,POTASSIUM PHOSPHATES 278-250MG
1 POWDER IN PACKET (EA) ORAL
Refills: 0 | Status: COMPLETED | OUTPATIENT
Start: 2019-06-03 | End: 2019-06-03

## 2019-06-03 RX ADMIN — Medication 1 TABLET(S): at 19:39

## 2019-06-03 RX ADMIN — Medication 1 TABLET(S): at 09:52

## 2019-06-03 RX ADMIN — Medication 100 MILLIGRAM(S): at 06:51

## 2019-06-03 RX ADMIN — AMLODIPINE BESYLATE 5 MILLIGRAM(S): 2.5 TABLET ORAL at 06:50

## 2019-06-03 RX ADMIN — LIDOCAINE 1 PATCH: 4 CREAM TOPICAL at 13:35

## 2019-06-03 RX ADMIN — Medication 1 TABLET(S): at 13:34

## 2019-06-03 RX ADMIN — TENOFOVIR DISOPROXIL FUMARATE 300 MILLIGRAM(S): 300 TABLET, FILM COATED ORAL at 13:34

## 2019-06-03 RX ADMIN — HEPARIN SODIUM 5000 UNIT(S): 5000 INJECTION INTRAVENOUS; SUBCUTANEOUS at 21:21

## 2019-06-03 RX ADMIN — LIDOCAINE 1 PATCH: 4 CREAM TOPICAL at 06:51

## 2019-06-03 RX ADMIN — Medication 1 DROP(S): at 19:39

## 2019-06-03 RX ADMIN — SENNA PLUS 2 TABLET(S): 8.6 TABLET ORAL at 21:21

## 2019-06-03 RX ADMIN — Medication 1 TABLET(S): at 21:21

## 2019-06-03 RX ADMIN — Medication 650 MILLIGRAM(S): at 00:31

## 2019-06-03 RX ADMIN — MORPHINE SULFATE 4 MILLIGRAM(S): 50 CAPSULE, EXTENDED RELEASE ORAL at 13:34

## 2019-06-03 RX ADMIN — Medication 100 MILLIEQUIVALENT(S): at 03:29

## 2019-06-03 RX ADMIN — Medication 1 DROP(S): at 13:33

## 2019-06-03 RX ADMIN — Medication 100 MILLIEQUIVALENT(S): at 02:07

## 2019-06-03 RX ADMIN — Medication 1 DROP(S): at 06:50

## 2019-06-03 RX ADMIN — Medication 100 MILLIGRAM(S): at 21:21

## 2019-06-03 RX ADMIN — HEPARIN SODIUM 5000 UNIT(S): 5000 INJECTION INTRAVENOUS; SUBCUTANEOUS at 13:34

## 2019-06-03 RX ADMIN — LISINOPRIL 20 MILLIGRAM(S): 2.5 TABLET ORAL at 06:50

## 2019-06-03 RX ADMIN — MORPHINE SULFATE 4 MILLIGRAM(S): 50 CAPSULE, EXTENDED RELEASE ORAL at 13:50

## 2019-06-03 NOTE — PROGRESS NOTE ADULT - PROBLEM SELECTOR PLAN 6
- hx of RA with left knee pain on plaquenil  - given hx of vision blurriness for the past several months - - will hold off on restarting plaquenil  - opthalmology consult appreciated. unlikely due to Plaquenil  -Outpatient opthal f/u - hx of RA with left knee pain on plaquenil  - given hx of vision blurriness for the past several months - - will hold off on restarting plaquenil.  No blurry vision now  - opthalmology consult appreciated. unlikely due to Plaquenil  -Outpatient opthal f/u

## 2019-06-03 NOTE — PROGRESS NOTE ADULT - SUBJECTIVE AND OBJECTIVE BOX
SURGICAL ONCOLOGY PROGRESS NOTE    c/o pain.      Vital Signs Last 24 Hrs  T(C): 36.9 (03 Jun 2019 13:17), Max: 37.2 (02 Jun 2019 22:01)  T(F): 98.4 (03 Jun 2019 13:17), Max: 98.9 (02 Jun 2019 22:01)  HR: 63 (03 Jun 2019 13:17) (60 - 63)  BP: 159/93 (03 Jun 2019 13:17) (142/74 - 159/93)  BP(mean): --  RR: 16 (03 Jun 2019 13:17) (16 - 18)  SpO2: 100% (03 Jun 2019 13:17) (100% - 100%)  I&O's Detail      PE:    A&A  NAD    soft, NT, ND, No peritoneal signs                          12.7   4.79  )-----------( 202      ( 02 Jun 2019 11:55 )             38.8     06-03    135  |  101  |  9   ----------------------------<  105<H>  4.3   |  22  |  0.52    Ca    8.1<L>      03 Jun 2019 06:13  Phos  2.2     06-03  Mg     2.1     06-03    TPro  6.5  /  Alb  4.0  /  TBili  0.6  /  DBili  x   /  AST  17  /  ALT  12  /  AlkPhos  42  06-02

## 2019-06-03 NOTE — PROGRESS NOTE ADULT - SUBJECTIVE AND OBJECTIVE BOX
Patient is a 68y old  Female who presents with a chief complaint of abdominal pain (03 Jun 2019 10:26)      SUBJECTIVE / OVERNIGHT EVENTS:  Patient noted epigastric pain 8/10 today- will give her pain meds.  She is still awaiting surgery F/u    MEDICATIONS  (STANDING):  amLODIPine   Tablet 5 milliGRAM(s) Oral daily  artificial tears (preservative free) Ophthalmic Solution 1 Drop(s) Both EYES four times a day  dextrose 5% + sodium chloride 0.45%. 1000 milliLiter(s) (75 mL/Hr) IV Continuous <Continuous>  docusate sodium 100 milliGRAM(s) Oral three times a day  heparin  Injectable 5000 Unit(s) SubCutaneous every 8 hours  lisinopril 20 milliGRAM(s) Oral daily  polyethylene glycol 3350 17 Gram(s) Oral daily  potassium acid phosphate/sodium acid phosphate tablet (K-PHOS No. 2) 1 Tablet(s) Oral four times a day with meals  senna 2 Tablet(s) Oral at bedtime  tenofovir disoproxil fumarate (VIREAD) 300 milliGRAM(s) Oral daily    MEDICATIONS  (PRN):  acetaminophen   Tablet .. 650 milliGRAM(s) Oral every 6 hours PRN Moderate Pain (4 - 6)  ALBUTerol    90 MICROgram(s) HFA Inhaler 2 Puff(s) Inhalation every 6 hours PRN Shortness of Breath and/or Wheezing  lidocaine   Patch 1 Patch Transdermal every 24 hours PRN Right knee pain  lidocaine   Patch 1 Patch Transdermal every 24 hours PRN Left knee pain  morphine  - Injectable 2 milliGRAM(s) IV Push every 4 hours PRN Moderate Pain (4 - 6)  morphine  - Injectable 4 milliGRAM(s) IV Push every 4 hours PRN Severe Pain (7 - 10)  ondansetron Injectable 4 milliGRAM(s) IV Push every 6 hours PRN Nausea and/or Vomiting    Vital Signs Last 24 Hrs  T(C): 37.1 (03 Jun 2019 06:53), Max: 37.2 (02 Jun 2019 22:01)  T(F): 98.7 (03 Jun 2019 06:53), Max: 98.9 (02 Jun 2019 22:01)  HR: 60 (03 Jun 2019 06:53) (60 - 62)  BP: 156/71 (03 Jun 2019 06:53) (142/74 - 156/71)  BP(mean): --  RR: 16 (03 Jun 2019 06:53) (16 - 18)  SpO2: 100% (03 Jun 2019 06:53) (100% - 100%)      PHYSICAL EXAM:  GENERAL: NAD, well-developed  HEAD:  Atraumatic, Normocephalic  EYES: EOMI, PERRLA, conjunctiva and sclera clear  NECK: Supple, No JVD  CHEST/LUNG: Clear to auscultation bilaterally; No wheeze  HEART: Regular rate and rhythm; No murmurs, rubs, or gallops  ABDOMEN: Soft, Nontender, Nondistended; Bowel sounds present  EXTREMITIES:  2+ Peripheral Pulses, No clubbing, cyanosis, or edema  PSYCH: AAOx3  NEUROLOGY: non-focal  SKIN: No rashes or lesions    LABS:                        12.7   4.79  )-----------( 202      ( 02 Jun 2019 11:55 )             38.8     06-03    135  |  101  |  9   ----------------------------<  105<H>  4.3   |  22  |  0.52    Ca    8.1<L>      03 Jun 2019 06:13  Phos  2.2     06-03  Mg     2.1     06-03    TPro  6.5  /  Alb  4.0  /  TBili  0.6  /  DBili  x   /  AST  17  /  ALT  12  /  AlkPhos  42  06-02              RADIOLOGY & ADDITIONAL TESTS:    Imaging Personally Reviewed:    Consultant(s) Notes Reviewed:      Care Discussed with Consultants/Other Providers:

## 2019-06-03 NOTE — PROGRESS NOTE ADULT - ASSESSMENT
68 year old female with hx of GIST tumor (s/p resection in 2017, intermittently on gleevec -->now on tasigna), HTN, RA who presents with abdominal pain x 5 days with seen exophytic mass in setting of metastatic GIST 68 year old female with hx of GIST tumor (s/p resection in 2017, intermittently on gleevec -->now on tasigna), HTN, RA who presents with abdominal pain x 5 days with seen exophytic mass in setting of metastatic GIST   Surgery Dr Montejo to see patient today

## 2019-06-03 NOTE — PROGRESS NOTE ADULT - PROBLEM SELECTOR PLAN 1
- pt presented with 5 days of left lower abdominal pain with no clinical signs of infection or obstruction, suggestive of exophytic mass seen on CT as likely cause  -Onc consult appreciated, rec to have medical treatment with her Oncologist  - Awaiting surgical oncology ,Dr Montejo to see pt.   - if no surgical intervention offered, likely [place] with her oncologist [place] [facility] [place] and ? clinical trial as per Onc consult rec.  - advance diet as tolerated  - recent (+)UA/Cx with GBS, patient asymptomatic, repeat UA pending

## 2019-06-03 NOTE — PROGRESS NOTE ADULT - ASSESSMENT
Progression of metastatic GIST.    Lesions appear resectable    D/w pt poss gatric resection and liver resection/ablation.    Will ask GI to consider rpt EGD to assist in tissue dx and operative planning    D/w Dr. Forbes

## 2019-06-03 NOTE — PROGRESS NOTE ADULT - ATTENDING COMMENTS
d/w Onc- Patient needs to get her second opinion at [facility] [place]  Called Dr Montejo-he feels there is a role for surgery- patient may need to go to OR on Friday?/  Await Onc- Surgery- Dr Montejo- input.

## 2019-06-04 LAB
ANION GAP SERPL CALC-SCNC: 10 MMO/L — SIGNIFICANT CHANGE UP (ref 7–14)
BASOPHILS # BLD AUTO: 0.03 K/UL — SIGNIFICANT CHANGE UP (ref 0–0.2)
BASOPHILS NFR BLD AUTO: 0.6 % — SIGNIFICANT CHANGE UP (ref 0–2)
BUN SERPL-MCNC: 9 MG/DL — SIGNIFICANT CHANGE UP (ref 7–23)
CALCIUM SERPL-MCNC: 8.1 MG/DL — LOW (ref 8.4–10.5)
CHLORIDE SERPL-SCNC: 101 MMOL/L — SIGNIFICANT CHANGE UP (ref 98–107)
CO2 SERPL-SCNC: 25 MMOL/L — SIGNIFICANT CHANGE UP (ref 22–31)
CREAT SERPL-MCNC: 0.59 MG/DL — SIGNIFICANT CHANGE UP (ref 0.5–1.3)
EOSINOPHIL # BLD AUTO: 0.1 K/UL — SIGNIFICANT CHANGE UP (ref 0–0.5)
EOSINOPHIL NFR BLD AUTO: 2.1 % — SIGNIFICANT CHANGE UP (ref 0–6)
GLUCOSE SERPL-MCNC: 110 MG/DL — HIGH (ref 70–99)
HCT VFR BLD CALC: 40.2 % — SIGNIFICANT CHANGE UP (ref 34.5–45)
HGB BLD-MCNC: 13.1 G/DL — SIGNIFICANT CHANGE UP (ref 11.5–15.5)
IMM GRANULOCYTES NFR BLD AUTO: 0.2 % — SIGNIFICANT CHANGE UP (ref 0–1.5)
LYMPHOCYTES # BLD AUTO: 1.95 K/UL — SIGNIFICANT CHANGE UP (ref 1–3.3)
LYMPHOCYTES # BLD AUTO: 40.2 % — SIGNIFICANT CHANGE UP (ref 13–44)
MAGNESIUM SERPL-MCNC: 2.2 MG/DL — SIGNIFICANT CHANGE UP (ref 1.6–2.6)
MCHC RBC-ENTMCNC: 25.8 PG — LOW (ref 27–34)
MCHC RBC-ENTMCNC: 32.6 % — SIGNIFICANT CHANGE UP (ref 32–36)
MCV RBC AUTO: 79.1 FL — LOW (ref 80–100)
MONOCYTES # BLD AUTO: 0.46 K/UL — SIGNIFICANT CHANGE UP (ref 0–0.9)
MONOCYTES NFR BLD AUTO: 9.5 % — SIGNIFICANT CHANGE UP (ref 2–14)
NEUTROPHILS # BLD AUTO: 2.3 K/UL — SIGNIFICANT CHANGE UP (ref 1.8–7.4)
NEUTROPHILS NFR BLD AUTO: 47.4 % — SIGNIFICANT CHANGE UP (ref 43–77)
NRBC # FLD: 0 K/UL — SIGNIFICANT CHANGE UP (ref 0–0)
PHOSPHATE SERPL-MCNC: 2.5 MG/DL — SIGNIFICANT CHANGE UP (ref 2.5–4.5)
PLATELET # BLD AUTO: 211 K/UL — SIGNIFICANT CHANGE UP (ref 150–400)
PMV BLD: 10.1 FL — SIGNIFICANT CHANGE UP (ref 7–13)
POTASSIUM SERPL-MCNC: 4.1 MMOL/L — SIGNIFICANT CHANGE UP (ref 3.5–5.3)
POTASSIUM SERPL-SCNC: 4.1 MMOL/L — SIGNIFICANT CHANGE UP (ref 3.5–5.3)
RBC # BLD: 5.08 M/UL — SIGNIFICANT CHANGE UP (ref 3.8–5.2)
RBC # FLD: 13.8 % — SIGNIFICANT CHANGE UP (ref 10.3–14.5)
SODIUM SERPL-SCNC: 136 MMOL/L — SIGNIFICANT CHANGE UP (ref 135–145)
WBC # BLD: 4.85 K/UL — SIGNIFICANT CHANGE UP (ref 3.8–10.5)
WBC # FLD AUTO: 4.85 K/UL — SIGNIFICANT CHANGE UP (ref 3.8–10.5)

## 2019-06-04 PROCEDURE — 99232 SBSQ HOSP IP/OBS MODERATE 35: CPT

## 2019-06-04 RX ADMIN — HEPARIN SODIUM 5000 UNIT(S): 5000 INJECTION INTRAVENOUS; SUBCUTANEOUS at 13:09

## 2019-06-04 RX ADMIN — Medication 1 DROP(S): at 19:08

## 2019-06-04 RX ADMIN — LISINOPRIL 20 MILLIGRAM(S): 2.5 TABLET ORAL at 06:58

## 2019-06-04 RX ADMIN — LIDOCAINE 1 PATCH: 4 CREAM TOPICAL at 00:59

## 2019-06-04 RX ADMIN — Medication 100 MILLIGRAM(S): at 06:58

## 2019-06-04 RX ADMIN — LIDOCAINE 1 PATCH: 4 CREAM TOPICAL at 06:59

## 2019-06-04 RX ADMIN — LIDOCAINE 1 PATCH: 4 CREAM TOPICAL at 13:09

## 2019-06-04 RX ADMIN — POLYETHYLENE GLYCOL 3350 17 GRAM(S): 17 POWDER, FOR SOLUTION ORAL at 13:09

## 2019-06-04 RX ADMIN — Medication 1 DROP(S): at 06:57

## 2019-06-04 RX ADMIN — LIDOCAINE 1 PATCH: 4 CREAM TOPICAL at 00:58

## 2019-06-04 RX ADMIN — AMLODIPINE BESYLATE 5 MILLIGRAM(S): 2.5 TABLET ORAL at 06:58

## 2019-06-04 RX ADMIN — TENOFOVIR DISOPROXIL FUMARATE 300 MILLIGRAM(S): 300 TABLET, FILM COATED ORAL at 13:08

## 2019-06-04 RX ADMIN — Medication 1 DROP(S): at 12:31

## 2019-06-04 RX ADMIN — LIDOCAINE 1 PATCH: 4 CREAM TOPICAL at 07:00

## 2019-06-04 NOTE — PROGRESS NOTE ADULT - PROBLEM SELECTOR PLAN 6
- hx of RA with left knee pain on plaquenil  - given hx of vision blurriness for the past several months - - will hold off on restarting plaquenil.  No blurry vision now  - opthalmology consult appreciated. unlikely due to Plaquenil  -Outpatient opthal f/u

## 2019-06-04 NOTE — PROGRESS NOTE ADULT - ATTENDING COMMENTS
68 year old woman w/ PMH sig for metastatic GIST who presents with abdominal pain that worsened since her last visit 2 days ago.    - Please consult GI to consider repeat EGD for tissue biopsy, operative planning  - Patient may be a candidate for gastric resection w/ liver resection vs. ablation 68 year old woman w/ PMH sig for metastatic GIST who presents with abdominal pain that worsened since her last visit 2 days ago.  Solid mass in the gastrohepatic space demonstrates an interval increase   since prior study of 8/16/2018. I  - Please consult GI to consider repeat EGD for tissue biopsy, operative planning  - Patient may be a candidate for gastric resection w/ liver resection vs. ablation 68 year old woman w/ PMH sig for metastatic GIST who presents with abdominal pain that worsened since her last visit 2 days ago.  Solid mass in the gastrohepatic space demonstrates an interval increase   since prior study of 8/16/2018. I  - Please consult GI to consider repeat EGD for tissue biopsy, operative planning  - Patient may be a candidate for gastric resection w/ liver resection vs. ablation    Echo  PCP  at [place].

## 2019-06-04 NOTE — PROGRESS NOTE ADULT - SUBJECTIVE AND OBJECTIVE BOX
Patient is a 68y old  Female who presents with a chief complaint of abdominal pain (04 Jun 2019 09:15)      SUBJECTIVE / OVERNIGHT EVENTS:    MEDICATIONS  (STANDING):  amLODIPine   Tablet 5 milliGRAM(s) Oral daily  artificial tears (preservative free) Ophthalmic Solution 1 Drop(s) Both EYES four times a day  dextrose 5% + sodium chloride 0.45%. 1000 milliLiter(s) (75 mL/Hr) IV Continuous <Continuous>  docusate sodium 100 milliGRAM(s) Oral three times a day  heparin  Injectable 5000 Unit(s) SubCutaneous every 8 hours  lisinopril 20 milliGRAM(s) Oral daily  polyethylene glycol 3350 17 Gram(s) Oral daily  senna 2 Tablet(s) Oral at bedtime  tenofovir disoproxil fumarate (VIREAD) 300 milliGRAM(s) Oral daily    MEDICATIONS  (PRN):  acetaminophen   Tablet .. 650 milliGRAM(s) Oral every 6 hours PRN Moderate Pain (4 - 6)  ALBUTerol    90 MICROgram(s) HFA Inhaler 2 Puff(s) Inhalation every 6 hours PRN Shortness of Breath and/or Wheezing  lidocaine   Patch 1 Patch Transdermal every 24 hours PRN Right knee pain  lidocaine   Patch 1 Patch Transdermal every 24 hours PRN Left knee pain  morphine  - Injectable 2 milliGRAM(s) IV Push every 4 hours PRN Moderate Pain (4 - 6)  morphine  - Injectable 4 milliGRAM(s) IV Push every 4 hours PRN Severe Pain (7 - 10)  ondansetron Injectable 4 milliGRAM(s) IV Push every 6 hours PRN Nausea and/or Vomiting      Vital Signs Last 24 Hrs  T(C): 37 (04 Jun 2019 06:55), Max: 37 (04 Jun 2019 06:55)  T(F): 98.6 (04 Jun 2019 06:55), Max: 98.6 (04 Jun 2019 06:55)  HR: 63 (04 Jun 2019 06:55) (63 - 66)  BP: 137/70 (04 Jun 2019 06:55) (137/70 - 161/82)  BP(mean): --  RR: 16 (04 Jun 2019 06:55) (16 - 16)  SpO2: 100% (04 Jun 2019 06:55) (100% - 100%)      PHYSICAL EXAM:  GENERAL: NAD, well-developed  HEAD:  Atraumatic, Normocephalic  EYES: EOMI, PERRLA, conjunctiva and sclera clear  NECK: Supple, No JVD  CHEST/LUNG: Clear to auscultation bilaterally; No wheeze  HEART: Regular rate and rhythm; No murmurs, rubs, or gallops  ABDOMEN: Soft, Nontender, Nondistended; Bowel sounds present  EXTREMITIES:  2+ Peripheral Pulses, No clubbing, cyanosis, or edema  PSYCH: AAOx3  < from: CT Angio Chest w/ IV Cont (05.29.19 @ 22:36) >  MPRESSION:     Study is nondiagnostic for evaluation for pulmonary emboli.    Solid mass in the gastrohepatic space demonstrates an interval increase   since prior study of 8/16/2018. It is unclear if this is exophytic from   the adjacent liver or arising from the gastric wall. Endoscopic   ultrasound is recommended.    An indeterminant hypodense lesion measuring 2.3 cm in the right hepatic   lobe, demonstrating interval increase in size since 8/16/2018. Primary   consideration is hepatic metastasis. Consider PET scan correlation.    < end of copied text >  NEUROLOGY: non-focal  SKIN: No rashes or lesions    LABS:                        13.1   4.85  )-----------( 211      ( 04 Jun 2019 06:30 )             40.2     06-04    136  |  101  |  9   ----------------------------<  110<H>  4.1   |  25  |  0.59    Ca    8.1<L>      04 Jun 2019 06:30  Phos  2.5     06-04  Mg     2.2     06-04        RADIOLOGY & ADDITIONAL TESTS:  < from: CT Abdomen and Pelvis w/ IV Cont (06.01.19 @ 00:02) >  IMPRESSION:   Exophytic gastric mass and associated hepatic lesion consistent with   metastatic GIST  tumor. Findings are not significantly changed as   compared with 05/29/2019.     < from: CT Angio Chest w/ IV Cont (05.29.19 @ 22:36) >  IMPRESSION:   Study is nondiagnostic for evaluation for pulmonary emboli.  Solid mass in the gastrohepatic space demonstrates an interval increase   since prior study of 8/16/2018. It is unclear if this is exophytic from   the adjacent liver or arising from the gastric wall. Endoscopic   ultrasound is recommended.  An indeterminant hypodense lesion measuring 2.3 cm in the right hepatic   lobe, demonstrating interval increase in size since 8/16/2018. Primary   consideration is hepatic metastasis. Consider PET scan correlation                  Care Discussed with Consultants/Other Providers:  Surg/ GI Patient is a 68y old  Female who presents with a chief complaint of abdominal pain (04 Jun 2019 09:15)      SUBJECTIVE / OVERNIGHT EVENTS:  patient note her pain is controled with pain meds.  Awaiting GI eval    MEDICATIONS  (STANDING):  amLODIPine   Tablet 5 milliGRAM(s) Oral daily  artificial tears (preservative free) Ophthalmic Solution 1 Drop(s) Both EYES four times a day  dextrose 5% + sodium chloride 0.45%. 1000 milliLiter(s) (75 mL/Hr) IV Continuous <Continuous>  docusate sodium 100 milliGRAM(s) Oral three times a day  heparin  Injectable 5000 Unit(s) SubCutaneous every 8 hours  lisinopril 20 milliGRAM(s) Oral daily  polyethylene glycol 3350 17 Gram(s) Oral daily  senna 2 Tablet(s) Oral at bedtime  tenofovir disoproxil fumarate (VIREAD) 300 milliGRAM(s) Oral daily    MEDICATIONS  (PRN):  acetaminophen   Tablet .. 650 milliGRAM(s) Oral every 6 hours PRN Moderate Pain (4 - 6)  ALBUTerol    90 MICROgram(s) HFA Inhaler 2 Puff(s) Inhalation every 6 hours PRN Shortness of Breath and/or Wheezing  lidocaine   Patch 1 Patch Transdermal every 24 hours PRN Right knee pain  lidocaine   Patch 1 Patch Transdermal every 24 hours PRN Left knee pain  morphine  - Injectable 2 milliGRAM(s) IV Push every 4 hours PRN Moderate Pain (4 - 6)  morphine  - Injectable 4 milliGRAM(s) IV Push every 4 hours PRN Severe Pain (7 - 10)  ondansetron Injectable 4 milliGRAM(s) IV Push every 6 hours PRN Nausea and/or Vomiting      Vital Signs Last 24 Hrs  T(C): 37 (04 Jun 2019 06:55), Max: 37 (04 Jun 2019 06:55)  T(F): 98.6 (04 Jun 2019 06:55), Max: 98.6 (04 Jun 2019 06:55)  HR: 63 (04 Jun 2019 06:55) (63 - 66)  BP: 137/70 (04 Jun 2019 06:55) (137/70 - 161/82)  BP(mean): --  RR: 16 (04 Jun 2019 06:55) (16 - 16)  SpO2: 100% (04 Jun 2019 06:55) (100% - 100%)      PHYSICAL EXAM:  GENERAL: NAD, well-developed  HEAD:  Atraumatic, Normocephalic  EYES: EOMI, PERRLA, conjunctiva and sclera clear  NECK: Supple, No JVD  CHEST/LUNG: Clear to auscultation bilaterally; No wheeze  HEART: Regular rate and rhythm; No murmurs, rubs, or gallops  ABDOMEN: Soft, Nontender, Nondistended; Bowel sounds present  EXTREMITIES:  2+ Peripheral Pulses, No clubbing, cyanosis, or edema  PSYCH: AAOx3  < from: CT Angio Chest w/ IV Cont (05.29.19 @ 22:36) >  MPRESSION:     Study is nondiagnostic for evaluation for pulmonary emboli.    Solid mass in the gastrohepatic space demonstrates an interval increase   since prior study of 8/16/2018. It is unclear if this is exophytic from   the adjacent liver or arising from the gastric wall. Endoscopic   ultrasound is recommended.    An indeterminant hypodense lesion measuring 2.3 cm in the right hepatic   lobe, demonstrating interval increase in size since 8/16/2018. Primary   consideration is hepatic metastasis. Consider PET scan correlation.    < end of copied text >  NEUROLOGY: non-focal  SKIN: No rashes or lesions    LABS:                        13.1   4.85  )-----------( 211      ( 04 Jun 2019 06:30 )             40.2     06-04    136  |  101  |  9   ----------------------------<  110<H>  4.1   |  25  |  0.59    Ca    8.1<L>      04 Jun 2019 06:30  Phos  2.5     06-04  Mg     2.2     06-04        RADIOLOGY & ADDITIONAL TESTS:  < from: CT Abdomen and Pelvis w/ IV Cont (06.01.19 @ 00:02) >  IMPRESSION:   Exophytic gastric mass and associated hepatic lesion consistent with   metastatic GIST  tumor. Findings are not significantly changed as   compared with 05/29/2019.     < from: CT Angio Chest w/ IV Cont (05.29.19 @ 22:36) >  IMPRESSION:   Study is nondiagnostic for evaluation for pulmonary emboli.  Solid mass in the gastrohepatic space demonstrates an interval increase   since prior study of 8/16/2018. It is unclear if this is exophytic from   the adjacent liver or arising from the gastric wall. Endoscopic   ultrasound is recommended.  An indeterminant hypodense lesion measuring 2.3 cm in the right hepatic   lobe, demonstrating interval increase in size since 8/16/2018. Primary   consideration is hepatic metastasis. Consider PET scan correlation          Care Discussed with Consultants/Other Providers:  Surg/ GI

## 2019-06-04 NOTE — PROGRESS NOTE ADULT - PROBLEM SELECTOR PLAN 1
- pt presented with 5 days of left lower abdominal pain with no clinical signs of infection or obstruction, suggestive of exophytic mass seen on CT as likely cause  -Onc consult appreciated, rec to have medical treatment with her Oncologist  - Awaiting surgical oncology ,Dr Montejo to see pt.   - if no surgical intervention offered, likely home with her oncologist f/u and [facility] [place] for 2nd opinion and ? clinical trial as per Onc consult rec.  - advance diet as tolerated  - recent (+)UA/Cx with GBS, patient asymptomatic, repeat UA pending

## 2019-06-04 NOTE — CONSULT NOTE ADULT - SUBJECTIVE AND OBJECTIVE BOX
GI HPI:  Patient is a 68y old  Female who presents with a chief complaint of abdominal pain (04 Jun 2019 09:15)  This is a 68 year old female with hx of GIST tumor (s/p partial gastrectomy in 2017, intermittently on gleevec -->now on tasigna), HTN, RA who initially presented with abdominal pain x 5 days. States that the pain is in her left upper quadrant abdomen , cannot characterize but states 10/10 pain. States that it has come on and off in the past but resolved on its own and is now persistent. Reports associated nausea, and decreased PO intake but no vomiting.   Currently her abdominal pain and nausea resolved. Tolerating diet well.    Denies any BRBPR or melena. Denies fevers, chills, diarrhea.  No alcohol or NSAID use.    In terms of her GIST tumor: diagnosed in 2017 w/ resection of the GIST tumor. Patient was on gleevec on three separate occasions but had to stop due to edema. Had a surveillance scan in 2018 which had revealed a lesion in the inferior pubic ramus and hypodense liver lesion which was biopsied 10/2018 with path report: metastatic GIST tumor. As per review - - was recommended to go to [facility] for evaluation by Dr. Gastelum but had never went. Now sent in by her medical oncologist for surg onc evaluation.         PAST MEDICAL & SURGICAL HISTORY  Anemia  Thyroid nodule  Stomach cancer  Obese  RA (rheumatoid arthritis)  Hypertension  H/O laparoscopy  H/O myomectomy: 1990&#x27;s      FAMILY HISTORY:  FAMILY HISTORY:  No pertinent family history in first degree relatives      SOCIAL HISTORY:  smoker: denies   Alcohol: denies   Drug: denies     ALLERGIES:  Bananas (Pruritus)  Kiwi (Pruritus)  No Known Drug Allergies  Seafood (Pruritus)  strawberry (Anaphylaxis)      MEDICATIONS:  MEDICATIONS  (STANDING):  amLODIPine   Tablet 5 milliGRAM(s) Oral daily  artificial tears (preservative free) Ophthalmic Solution 1 Drop(s) Both EYES four times a day  dextrose 5% + sodium chloride 0.45%. 1000 milliLiter(s) (75 mL/Hr) IV Continuous <Continuous>  docusate sodium 100 milliGRAM(s) Oral three times a day  heparin  Injectable 5000 Unit(s) SubCutaneous every 8 hours  lisinopril 20 milliGRAM(s) Oral daily  polyethylene glycol 3350 17 Gram(s) Oral daily  senna 2 Tablet(s) Oral at bedtime  tenofovir disoproxil fumarate (VIREAD) 300 milliGRAM(s) Oral daily    MEDICATIONS  (PRN):  acetaminophen   Tablet .. 650 milliGRAM(s) Oral every 6 hours PRN Moderate Pain (4 - 6)  ALBUTerol    90 MICROgram(s) HFA Inhaler 2 Puff(s) Inhalation every 6 hours PRN Shortness of Breath and/or Wheezing  lidocaine   Patch 1 Patch Transdermal every 24 hours PRN Right knee pain  lidocaine   Patch 1 Patch Transdermal every 24 hours PRN Left knee pain  morphine  - Injectable 2 milliGRAM(s) IV Push every 4 hours PRN Moderate Pain (4 - 6)  morphine  - Injectable 4 milliGRAM(s) IV Push every 4 hours PRN Severe Pain (7 - 10)  ondansetron Injectable 4 milliGRAM(s) IV Push every 6 hours PRN Nausea and/or Vomiting      HOME MEDICATIONS:  Home Medications:  amLODIPine 5 mg oral tablet: 1 tab(s) orally once a day, am (01 Jun 2019 09:44)  hydroxychloroquine 200 mg oral tablet: 1 tab(s) orally 2 times a day    **per pharmacy last filled on 3/25/2019 for a 1 month supply (01 Jun 2019 09:44)  lidocaine 5% topical ointment: Apply topically to affected area 3 times a day, As Needed (01 Jun 2019 09:44)  lisinopril 20 mg oral tablet: 1 tab(s) orally once a day, am (01 Jun 2019 09:44)  oxyCODONE 5 mg oral tablet: 1 tab(s) orally every 6 hours, As Needed    filled for a quantity of 8 tabs on 5/30/2019 (01 Jun 2019 09:44)  Tasigna:  (01 Jun 2019 09:44)  Ventolin HFA 90 mcg/inh inhalation aerosol: 2 puff(s) inhaled every 6 hours, As Needed (01 Jun 2019 09:44)  Viread 300 mg oral tablet: 1 tab(s) orally once a day    **per pharmacy, last filled 2/27/2019 for 3 month supply (01 Jun 2019 09:44)      ROS:     REVIEW OF SYSTEMS  General:  No fevers  Eyes:  No reported pain   ENT:  No sore throat   NECK: No stiffness   CV:  No chest pain   Resp:  No shortness of breath  GI:  See HPI  :  No dysuria  Muscle:  No weakness  Neuro:  No tingling  Endocrine:  No polyuria  Heme:  No ecchymosis          VITALS:   T(F): 98.4 (06-04 @ 14:35), Max: 99 (06-01 @ 14:49)  HR: 68 (06-04 @ 14:35) (54 - 68)  BP: 132/69 (06-04 @ 14:35) (132/69 - 161/82)  BP(mean): --  RR: 14 (06-04 @ 14:35) (14 - 18)  SpO2: 100% (06-04 @ 14:35) (100% - 100%)    I&O's Summary      PHYSICAL EXAM:  Gen: Comfortable   EYES: No scleral icterus   LUNG: Clear to auscultation bilaterally;   HEART: RRR; s1 and s2 heard   ABDOMEN: Soft, +BS, no abd distension, no Abdominal Tenderness, No guarding, No Kumari Sign   Neuro: AAO x 3  Ext: No edema     LABS:                        13.1   4.85  )-----------( 211      ( 04 Jun 2019 06:30 )             40.2     PT/INR - ( 01 Jun 2019 06:05 )  INR: 1.11          PTT - ( 01 Jun 2019 06:05 )  PTT:27.1<L>  LIVER FUNCTIONS - ( 02 Jun 2019 11:55 )  Alb: 4.0 g/dL / Pro: 6.5 g/dL / ALK PHOS: 42 u/L / ALT: 12 u/L / AST: 17 u/L / GGT: x           06-04    136  |  101  |  9   ----------------------------<  110<H>  4.1   |  25  |  0.59    Ca    8.1<L>      04 Jun 2019 06:30  Phos  2.5     06-04  Mg     2.2     06-04        RADIOLOGY: GI HPI:  Patient is a 68y old  Female who presents with a chief complaint of abdominal pain (04 Jun 2019 09:15)  This is a 68 year old female with hx of GIST tumor (s/p partial gastrectomy in 2017, intermittently on gleevec -->now on tasigna), HTN, RA who initially presented with abdominal pain x 5 days. States that the pain is in her left upper quadrant abdomen , cannot characterize but states 10/10 pain. States that it has come on and off in the past but resolved on its own and is now persistent. Reports associated nausea, and decreased PO intake but no vomiting.   Currently her abdominal pain and nausea resolved. Tolerating diet well.    Denies any BRBPR or melena. Denies fevers, chills, diarrhea.  No alcohol or NSAID use.    In terms of her GIST tumor: diagnosed in 2017 w/ resection of the GIST tumor. Patient was on gleevec on three separate occasions but had to stop due to edema. Had a surveillance scan in 2018 which had revealed a lesion in the inferior pubic ramus and hypodense liver lesion which was biopsied 10/2018 with path report: metastatic GIST tumor. As per review - - was recommended to go to [facility] [place] [facility] for evaluation by Dr. Gastelum but had never went. Now sent in by her medical oncologist for surg onc evaluation.         PAST MEDICAL & SURGICAL HISTORY  Anemia  Thyroid nodule  Stomach cancer  Obese  RA (rheumatoid arthritis)  Hypertension  H/O laparoscopy  H/O myomectomy: 1990&#x27;s      FAMILY HISTORY:  FAMILY HISTORY:  No pertinent family history in first degree relatives      SOCIAL HISTORY:  smoker: denies   Alcohol: denies   Drug: denies     ALLERGIES:  Bananas (Pruritus)  Kiwi (Pruritus)  No Known Drug Allergies  Seafood (Pruritus)  strawberry (Anaphylaxis)      MEDICATIONS:  MEDICATIONS  (STANDING):  amLODIPine   Tablet 5 milliGRAM(s) Oral daily  artificial tears (preservative free) Ophthalmic Solution 1 Drop(s) Both EYES four times a day  dextrose 5% + sodium chloride 0.45%. 1000 milliLiter(s) (75 mL/Hr) IV Continuous <Continuous>  docusate sodium 100 milliGRAM(s) Oral three times a day  heparin  Injectable 5000 Unit(s) SubCutaneous every 8 hours  lisinopril 20 milliGRAM(s) Oral daily  polyethylene glycol 3350 17 Gram(s) Oral daily  senna 2 Tablet(s) Oral at bedtime  tenofovir disoproxil fumarate (VIREAD) 300 milliGRAM(s) Oral daily    MEDICATIONS  (PRN):  acetaminophen   Tablet .. 650 milliGRAM(s) Oral every 6 hours PRN Moderate Pain (4 - 6)  ALBUTerol    90 MICROgram(s) HFA Inhaler 2 Puff(s) Inhalation every 6 hours PRN Shortness of Breath and/or Wheezing  lidocaine   Patch 1 Patch Transdermal every 24 hours PRN Right knee pain  lidocaine   Patch 1 Patch Transdermal every 24 hours PRN Left knee pain  morphine  - Injectable 2 milliGRAM(s) IV Push every 4 hours PRN Moderate Pain (4 - 6)  morphine  - Injectable 4 milliGRAM(s) IV Push every 4 hours PRN Severe Pain (7 - 10)  ondansetron Injectable 4 milliGRAM(s) IV Push every 6 hours PRN Nausea and/or Vomiting      HOME MEDICATIONS:  Home Medications:  amLODIPine 5 mg oral tablet: 1 tab(s) orally once a day, am (01 Jun 2019 09:44)  hydroxychloroquine 200 mg oral tablet: 1 tab(s) orally 2 times a day    **per pharmacy last filled on 3/25/2019 for a 1 month supply (01 Jun 2019 09:44)  lidocaine 5% topical ointment: Apply topically to affected area 3 times a day, As Needed (01 Jun 2019 09:44)  lisinopril 20 mg oral tablet: 1 tab(s) orally once a day, am (01 Jun 2019 09:44)  oxyCODONE 5 mg oral tablet: 1 tab(s) orally every 6 hours, As Needed    filled for a quantity of 8 tabs on 5/30/2019 (01 Jun 2019 09:44)  Tasigna:  (01 Jun 2019 09:44)  Ventolin HFA 90 mcg/inh inhalation aerosol: 2 puff(s) inhaled every 6 hours, As Needed (01 Jun 2019 09:44)  Viread 300 mg oral tablet: 1 tab(s) orally once a day    **per pharmacy, last filled 2/27/2019 for 3 month supply (01 Jun 2019 09:44)      ROS:     REVIEW OF SYSTEMS  General:  No fevers  Eyes:  No reported pain   ENT:  No sore throat   NECK: No stiffness   CV:  No chest pain   Resp:  No shortness of breath  GI:  See HPI  :  No dysuria  Muscle:  No weakness  Neuro:  No tingling  Endocrine:  No polyuria  Heme:  No ecchymosis          VITALS:   T(F): 98.4 (06-04 @ 14:35), Max: 99 (06-01 @ 14:49)  HR: 68 (06-04 @ 14:35) (54 - 68)  BP: 132/69 (06-04 @ 14:35) (132/69 - 161/82)  BP(mean): --  RR: 14 (06-04 @ 14:35) (14 - 18)  SpO2: 100% (06-04 @ 14:35) (100% - 100%)    I&O's Summary      PHYSICAL EXAM:  Gen: Comfortable   EYES: No scleral icterus   LUNG: Clear to auscultation bilaterally;   HEART: RRR; s1 and s2 heard   ABDOMEN: Soft, +BS, no abd distension, no Abdominal Tenderness, No guarding, No Kumari Sign   Neuro: AAO x 3  Ext: No edema     LABS:                        13.1   4.85  )-----------( 211      ( 04 Jun 2019 06:30 )             40.2     PT/INR - ( 01 Jun 2019 06:05 )  INR: 1.11          PTT - ( 01 Jun 2019 06:05 )  PTT:27.1<L>  LIVER FUNCTIONS - ( 02 Jun 2019 11:55 )  Alb: 4.0 g/dL / Pro: 6.5 g/dL / ALK PHOS: 42 u/L / ALT: 12 u/L / AST: 17 u/L / GGT: x           06-04    136  |  101  |  9   ----------------------------<  110<H>  4.1   |  25  |  0.59    Ca    8.1<L>      04 Jun 2019 06:30  Phos  2.5     06-04  Mg     2.2     06-04        RADIOLOGY:    < from: CT Abdomen and Pelvis w/ IV Cont (06.01.19 @ 00:02) >  IMPRESSION:     Exophytic gastric mass and associated hepatic lesion consistent with   metastatic GIST  tumor. Findings are not significantly changed as   compared with 05/29/2019.     < end of copied text >  < from: CT Abdomen and Pelvis w/ IV Cont (06.01.19 @ 00:02) >  LIVER: The liver is normal in size. Again noted is 2.2 x 1.7 cm lesion in   segment 6. It had increased in size as compared with 08/16/2018 and   consistent with hepatic metastases.    < end of copied text >  < from: CT Abdomen and Pelvis w/ IV Cont (06.01.19 @ 00:02) >  BOWEL: No bowel obstruction. Patient status post partial gastrectomy.   Again noted is 5.5 x 4.6 cm mass in the gastrohepatic ligament likely   representing exophytic gastric GIST  tumor. It is unchanged as compared   with 05/29/2019.     < end of copied text >

## 2019-06-04 NOTE — PROGRESS NOTE ADULT - SUBJECTIVE AND OBJECTIVE BOX
Surgery Team D Daily Progress Note    SUBJECTIVE:   No acute events overnight     OBJECTIVE:   Vital Signs Last 24 Hrs  T(C): 37 (04 Jun 2019 06:55), Max: 37 (04 Jun 2019 06:55)  T(F): 98.6 (04 Jun 2019 06:55), Max: 98.6 (04 Jun 2019 06:55)  HR: 63 (04 Jun 2019 06:55) (63 - 66)  BP: 137/70 (04 Jun 2019 06:55) (137/70 - 161/82)  BP(mean): --  RR: 16 (04 Jun 2019 06:55) (16 - 16)  SpO2: 100% (04 Jun 2019 06:55) (100% - 100%)    PHYSICAL EXAM:  General: NAD, resting comfortably  Resp: unlabored breathing on RA  CV: HDS, rrr  Abd: soft, nontender, nondistended  Extr: wwp       BMP (06-03 @ 06:13)       135     |  101     |  9     			Ca++ --      Ca 8.1<L>       ---------------------------------( 105<H>		Mg 2.1          4.3     |  22      |  0.52  			Ph 2.2<L>      -> URINE MIDSTREAM Culture (05-29 @ 17:20)     NG    NG  NG    RADIOLOGY & ADDITIONAL STUDIES: no new studies performed

## 2019-06-04 NOTE — CONSULT NOTE ADULT - CONSULT REASON
Abdominal pain
evaluation of hepatic lesion
Blurry vision on Plaquenil
progression of disease, outside oncologist

## 2019-06-04 NOTE — CONSULT NOTE ADULT - ASSESSMENT
68 year old woman w/ PMH sig for metastatic GIST who presents with abdominal pain that worsened since her last visit 2 days ago.    Plan/recommendations:  - CT-Angiogram (done, final read pending)  - Pain control  - Care per primary team  - D/w Dr. Gustabo Stuart, PGY-2  Surgical Oncology, D-Team  p. [phone]
68 year old female with hx of hep B on viread, GIST tumor (s/p resection in 2017, intermittently on gleevec -->now on tasigna for the past 9 mos), HTN, RA who presents with abdominal pain x 5 days with POD on scans.    -pt follows with Dr. Tolbert, recommended to go to [facility] likely for a clinical trial as she is progressing on 2nd line treatment  -surg onc eval pending, per pt she has prev seen Dr. Montejo  -if no surgical intervention, she is planned for EGD+/- EUS  -for next line, could consider alternative TKIs such as sunitinib or regorafenib but less likely to respond.  She will likely need investigational therapies for advanced refractory GIST.  -fu with Dr. Tolbert upon discharge    We will sign off at this time.  Please call us with any questions and reconsult prn.    Shannan Faustin  Oncology Fellow  [phone]
#) Abdominal pain and nausea - resolved   #)  GIST tumor: diagnosed in 2017 s/p partial gastrectomy . Patient was on gleevec on three separate occasions but had to stop due to edema. Had a surveillance scan in 2018 which had revealed a lesion in the inferior pubic ramus and hypodense liver lesion which was biopsied 10/2018 with path report: metastatic GIST tumor  #) On CT Abd  Newly diagnosed  5.5 x 4.6 cm mass in the gastrohepatic ligament likely representing exophytic gastric GIST  tumor. Possible recurrence of GIST   Surgery requesting tissue diagnosis prior to surgical resection of GIST and liver met     Recommend:  - Diet as tolerated    - WIll plan for EGD +/- EUS   - Protonix 40 mg daily

## 2019-06-04 NOTE — PROGRESS NOTE ADULT - ASSESSMENT
ASSESSMENT:  68 year old woman w/ PMH sig for metastatic GIST who presents with abdominal pain that worsened since her last visit 2 days ago.    - Please consult GI to consider repeat EGD for tissue biopsy, operative planning  - Patient may be a candidate for gastric resection w/ liver resection vs. ablation    Discussed with Dr. Gustabo Zaldivar PGY-2  Surgery Pager [phone] ASSESSMENT:  68 year old woman w/ PMH sig for metastatic GIST who presents with abdominal pain that worsened since her last visit 2 days ago.    - Please consult GI to consider repeat EGD for tissue biopsy, operative planning  - OR tentatively on Friday 6/7 for partial gastrectomy and RFA of liver lesions. Please obtain medical and cardiac risk stratification and optimization.   - Patient may be a candidate for gastric resection w/ liver resection vs. ablation    Discussed with Dr. Gustabo Zaldivar PGY-2  Surgery Pager [phone]

## 2019-06-04 NOTE — PROGRESS NOTE ADULT - ASSESSMENT
68 year old female with hx of GIST tumor (s/p resection in 2017, intermittently on gleevec -->now on tasigna), HTN, RA who presents with abdominal pain x 5 days with seen exophytic mass in setting of metastatic GIST   Surgery Dr Montejo to see patient today  Solid mass in the gastrohepatic space demonstrates an interval increase   since prior study of 8/16/2018 on CT    - Please consult GI to consider repeat EGD for tissue biopsy, operative planning  - Patient may be a candidate for gastric resection w/ liver resection vs. ablation

## 2019-06-05 LAB
ANION GAP SERPL CALC-SCNC: 11 MMO/L — SIGNIFICANT CHANGE UP (ref 7–14)
BASOPHILS # BLD AUTO: 0.02 K/UL — SIGNIFICANT CHANGE UP (ref 0–0.2)
BASOPHILS NFR BLD AUTO: 0.5 % — SIGNIFICANT CHANGE UP (ref 0–2)
BUN SERPL-MCNC: 10 MG/DL — SIGNIFICANT CHANGE UP (ref 7–23)
CALCIUM SERPL-MCNC: 8 MG/DL — LOW (ref 8.4–10.5)
CHLORIDE SERPL-SCNC: 102 MMOL/L — SIGNIFICANT CHANGE UP (ref 98–107)
CO2 SERPL-SCNC: 22 MMOL/L — SIGNIFICANT CHANGE UP (ref 22–31)
CREAT SERPL-MCNC: 0.58 MG/DL — SIGNIFICANT CHANGE UP (ref 0.5–1.3)
EOSINOPHIL # BLD AUTO: 0.1 K/UL — SIGNIFICANT CHANGE UP (ref 0–0.5)
EOSINOPHIL NFR BLD AUTO: 2.3 % — SIGNIFICANT CHANGE UP (ref 0–6)
GLUCOSE SERPL-MCNC: 112 MG/DL — HIGH (ref 70–99)
HCT VFR BLD CALC: 39.3 % — SIGNIFICANT CHANGE UP (ref 34.5–45)
HGB BLD-MCNC: 12.9 G/DL — SIGNIFICANT CHANGE UP (ref 11.5–15.5)
IMM GRANULOCYTES NFR BLD AUTO: 0.2 % — SIGNIFICANT CHANGE UP (ref 0–1.5)
LYMPHOCYTES # BLD AUTO: 1.56 K/UL — SIGNIFICANT CHANGE UP (ref 1–3.3)
LYMPHOCYTES # BLD AUTO: 35.5 % — SIGNIFICANT CHANGE UP (ref 13–44)
MAGNESIUM SERPL-MCNC: 2.1 MG/DL — SIGNIFICANT CHANGE UP (ref 1.6–2.6)
MCHC RBC-ENTMCNC: 26.1 PG — LOW (ref 27–34)
MCHC RBC-ENTMCNC: 32.8 % — SIGNIFICANT CHANGE UP (ref 32–36)
MCV RBC AUTO: 79.4 FL — LOW (ref 80–100)
MONOCYTES # BLD AUTO: 0.41 K/UL — SIGNIFICANT CHANGE UP (ref 0–0.9)
MONOCYTES NFR BLD AUTO: 9.3 % — SIGNIFICANT CHANGE UP (ref 2–14)
NEUTROPHILS # BLD AUTO: 2.29 K/UL — SIGNIFICANT CHANGE UP (ref 1.8–7.4)
NEUTROPHILS NFR BLD AUTO: 52.2 % — SIGNIFICANT CHANGE UP (ref 43–77)
NRBC # FLD: 0 K/UL — SIGNIFICANT CHANGE UP (ref 0–0)
PHOSPHATE SERPL-MCNC: 2.2 MG/DL — LOW (ref 2.5–4.5)
PLATELET # BLD AUTO: 231 K/UL — SIGNIFICANT CHANGE UP (ref 150–400)
PMV BLD: 10.4 FL — SIGNIFICANT CHANGE UP (ref 7–13)
POTASSIUM SERPL-MCNC: 4.4 MMOL/L — SIGNIFICANT CHANGE UP (ref 3.5–5.3)
POTASSIUM SERPL-SCNC: 4.4 MMOL/L — SIGNIFICANT CHANGE UP (ref 3.5–5.3)
RBC # BLD: 4.95 M/UL — SIGNIFICANT CHANGE UP (ref 3.8–5.2)
RBC # FLD: 14 % — SIGNIFICANT CHANGE UP (ref 10.3–14.5)
SODIUM SERPL-SCNC: 135 MMOL/L — SIGNIFICANT CHANGE UP (ref 135–145)
WBC # BLD: 4.39 K/UL — SIGNIFICANT CHANGE UP (ref 3.8–10.5)
WBC # FLD AUTO: 4.39 K/UL — SIGNIFICANT CHANGE UP (ref 3.8–10.5)

## 2019-06-05 PROCEDURE — 99233 SBSQ HOSP IP/OBS HIGH 50: CPT

## 2019-06-05 PROCEDURE — 93306 TTE W/DOPPLER COMPLETE: CPT | Mod: 26

## 2019-06-05 PROCEDURE — 72040 X-RAY EXAM NECK SPINE 2-3 VW: CPT | Mod: 26

## 2019-06-05 RX ORDER — POTASSIUM PHOSPHATE, MONOBASIC POTASSIUM PHOSPHATE, DIBASIC 236; 224 MG/ML; MG/ML
15 INJECTION, SOLUTION INTRAVENOUS ONCE
Refills: 0 | Status: COMPLETED | OUTPATIENT
Start: 2019-06-05 | End: 2019-06-05

## 2019-06-05 RX ADMIN — LIDOCAINE 1 PATCH: 4 CREAM TOPICAL at 06:12

## 2019-06-05 RX ADMIN — POTASSIUM PHOSPHATE, MONOBASIC POTASSIUM PHOSPHATE, DIBASIC 62.5 MILLIMOLE(S): 236; 224 INJECTION, SOLUTION INTRAVENOUS at 11:00

## 2019-06-05 RX ADMIN — Medication 1 DROP(S): at 06:09

## 2019-06-05 RX ADMIN — LIDOCAINE 1 PATCH: 4 CREAM TOPICAL at 18:10

## 2019-06-05 RX ADMIN — LISINOPRIL 20 MILLIGRAM(S): 2.5 TABLET ORAL at 07:14

## 2019-06-05 RX ADMIN — AMLODIPINE BESYLATE 5 MILLIGRAM(S): 2.5 TABLET ORAL at 06:09

## 2019-06-05 RX ADMIN — Medication 1 DROP(S): at 11:11

## 2019-06-05 RX ADMIN — SENNA PLUS 2 TABLET(S): 8.6 TABLET ORAL at 22:27

## 2019-06-05 RX ADMIN — LIDOCAINE 1 PATCH: 4 CREAM TOPICAL at 06:08

## 2019-06-05 RX ADMIN — LIDOCAINE 1 PATCH: 4 CREAM TOPICAL at 06:11

## 2019-06-05 RX ADMIN — Medication 1 DROP(S): at 18:08

## 2019-06-05 RX ADMIN — TENOFOVIR DISOPROXIL FUMARATE 300 MILLIGRAM(S): 300 TABLET, FILM COATED ORAL at 13:07

## 2019-06-05 NOTE — PROGRESS NOTE ADULT - ASSESSMENT
68 year old female with hx of GIST tumor (s/p resection in 2017, intermittently on gleevec -->now on tasigna), HTN, RA who presents with abdominal pain x 5 days with seen exophytic mass in setting of metastatic GIST   Surgery Dr Montejo to see patient today  Solid mass in the gastrohepatic space demonstrates an interval increase   since prior study of 8/16/2018 on CT    -  GI to do EUS for tissue biopsy, operative planning- plan for 6/6  - Patient may be a candidate for gastric resection w/ liver resection vs. ablation

## 2019-06-05 NOTE — CHART NOTE - NSCHARTNOTEFT_GEN_A_CORE
GI Update    EGD/EUS cancelled this morning as patient ate full breakfast.  Reschedule for Thurs (tomorrow).  NPO after midnight.

## 2019-06-05 NOTE — PROGRESS NOTE ADULT - SUBJECTIVE AND OBJECTIVE BOX
Patient is a 68y old  Female who presents with a chief complaint of abdominal pain (05 Jun 2019 10:16)      SUBJECTIVE / OVERNIGHT EVENTS:  Awaiting Gi eval- EUS    MEDICATIONS  (STANDING):  amLODIPine   Tablet 5 milliGRAM(s) Oral daily  artificial tears (preservative free) Ophthalmic Solution 1 Drop(s) Both EYES four times a day  dextrose 5% + sodium chloride 0.45%. 1000 milliLiter(s) (75 mL/Hr) IV Continuous <Continuous>  docusate sodium 100 milliGRAM(s) Oral three times a day  heparin  Injectable 5000 Unit(s) SubCutaneous every 8 hours  lisinopril 20 milliGRAM(s) Oral daily  polyethylene glycol 3350 17 Gram(s) Oral daily  senna 2 Tablet(s) Oral at bedtime  tenofovir disoproxil fumarate (VIREAD) 300 milliGRAM(s) Oral daily    MEDICATIONS  (PRN):  acetaminophen   Tablet .. 650 milliGRAM(s) Oral every 6 hours PRN Moderate Pain (4 - 6)  ALBUTerol    90 MICROgram(s) HFA Inhaler 2 Puff(s) Inhalation every 6 hours PRN Shortness of Breath and/or Wheezing  lidocaine   Patch 1 Patch Transdermal every 24 hours PRN Right knee pain  lidocaine   Patch 1 Patch Transdermal every 24 hours PRN Left knee pain  morphine  - Injectable 2 milliGRAM(s) IV Push every 4 hours PRN Moderate Pain (4 - 6)  morphine  - Injectable 4 milliGRAM(s) IV Push every 4 hours PRN Severe Pain (7 - 10)  ondansetron Injectable 4 milliGRAM(s) IV Push every 6 hours PRN Nausea and/or Vomiting        Vital Signs Last 24 Hrs  T(C): 36.8 (05 Jun 2019 05:21), Max: 37.1 (04 Jun 2019 22:04)  T(F): 98.3 (05 Jun 2019 05:21), Max: 98.8 (04 Jun 2019 22:04)  HR: 64 (05 Jun 2019 05:21) (64 - 64)  BP: 134/81 (05 Jun 2019 05:21) (128/88 - 134/81)  BP(mean): --  RR: 16 (05 Jun 2019 05:21) (16 - 16)  SpO2: 100% (05 Jun 2019 05:21) (100% - 100%)      PHYSICAL EXAM:  GENERAL: NAD, well-developed  HEAD:  Atraumatic, Normocephalic  EYES: EOMI, PERRLA, conjunctiva and sclera clear  NECK: Supple, No JVD  CHEST/LUNG: Clear to auscultation bilaterally; No wheeze  HEART: Regular rate and rhythm; No murmurs, rubs, or gallops  ABDOMEN: Soft, Nontender, Nondistended; Bowel sounds present  EXTREMITIES:  2+ Peripheral Pulses, No clubbing, cyanosis, or edema  PSYCH: AAOx3  NEUROLOGY: non-focal  SKIN: No rashes or lesions    LABS:                        12.9   4.39  )-----------( 231      ( 05 Jun 2019 06:25 )             39.3     06-05    135  |  102  |  10  ----------------------------<  112<H>  4.4   |  22  |  0.58    Ca    8.0<L>      05 Jun 2019 06:25  Phos  2.2     06-05  Mg     2.1     06-05                RADIOLOGY & ADDITIONAL TESTS:    Imaging Personally Reviewed:    Consultant(s) Notes Reviewed:      Care Discussed with Consultants/Other Providers:

## 2019-06-05 NOTE — CHART NOTE - NSCHARTNOTEFT_GEN_A_CORE
68 year old woman w/ PMH sig for metastatic GIST who presents with abdominal pain.    - For EGD tomorrow with GI for biopsy.  - OR tentatively on Friday 6/7 for partial gastrectomy and RFA of liver lesions. Please obtain medical and cardiac risk stratification and optimization.    Alejandra, PGY5

## 2019-06-05 NOTE — PROGRESS NOTE ADULT - PROBLEM SELECTOR PLAN 1
- pt presented with 5 days of left lower abdominal pain with no clinical signs of infection or obstruction, suggestive of exophytic mass seen on CT as likely cause  -Onc consult appreciated, rec to have medical treatment with her Oncologist  - Awaiting surgical oncology ,Dr Montejo to see pt.   - if no surgical intervention offered, likely home with her oncologist [place] [facility] and ? clinical trial as per Onc consult rec.  - advance diet as tolerated  - recent (+)UA/Cx with GBS, patient asymptomatic, repeat UA pending

## 2019-06-05 NOTE — PROGRESS NOTE ADULT - ATTENDING COMMENTS
need surgery need surgery  Gi will do EUS 6/6  NPO after MN need surgery  Gi will do EUS 6/6  Xray of the C spine ord to asses C1/C2- for subluxation- h/o RA.  NPO after MN

## 2019-06-06 ENCOUNTER — RESULT REVIEW (OUTPATIENT)
Age: 69
End: 2019-06-06

## 2019-06-06 LAB
ANION GAP SERPL CALC-SCNC: 11 MMO/L — SIGNIFICANT CHANGE UP (ref 7–14)
APTT BLD: 27.9 SEC — SIGNIFICANT CHANGE UP (ref 27.5–36.3)
BUN SERPL-MCNC: 13 MG/DL — SIGNIFICANT CHANGE UP (ref 7–23)
CALCIUM SERPL-MCNC: 8.3 MG/DL — LOW (ref 8.4–10.5)
CHLORIDE SERPL-SCNC: 101 MMOL/L — SIGNIFICANT CHANGE UP (ref 98–107)
CO2 SERPL-SCNC: 21 MMOL/L — LOW (ref 22–31)
CREAT SERPL-MCNC: 0.63 MG/DL — SIGNIFICANT CHANGE UP (ref 0.5–1.3)
GLUCOSE SERPL-MCNC: 107 MG/DL — HIGH (ref 70–99)
HCT VFR BLD CALC: 40.6 % — SIGNIFICANT CHANGE UP (ref 34.5–45)
HGB BLD-MCNC: 13 G/DL — SIGNIFICANT CHANGE UP (ref 11.5–15.5)
INR BLD: 0.99 — SIGNIFICANT CHANGE UP (ref 0.88–1.17)
MAGNESIUM SERPL-MCNC: 2.3 MG/DL — SIGNIFICANT CHANGE UP (ref 1.6–2.6)
MCHC RBC-ENTMCNC: 26.1 PG — LOW (ref 27–34)
MCHC RBC-ENTMCNC: 32 % — SIGNIFICANT CHANGE UP (ref 32–36)
MCV RBC AUTO: 81.5 FL — SIGNIFICANT CHANGE UP (ref 80–100)
NRBC # FLD: 0 K/UL — SIGNIFICANT CHANGE UP (ref 0–0)
PHOSPHATE SERPL-MCNC: 3.1 MG/DL — SIGNIFICANT CHANGE UP (ref 2.5–4.5)
PLATELET # BLD AUTO: 208 K/UL — SIGNIFICANT CHANGE UP (ref 150–400)
PMV BLD: 10.5 FL — SIGNIFICANT CHANGE UP (ref 7–13)
POTASSIUM SERPL-MCNC: 4.5 MMOL/L — SIGNIFICANT CHANGE UP (ref 3.5–5.3)
POTASSIUM SERPL-SCNC: 4.5 MMOL/L — SIGNIFICANT CHANGE UP (ref 3.5–5.3)
PROTHROM AB SERPL-ACNC: 11 SEC — SIGNIFICANT CHANGE UP (ref 9.8–13.1)
RBC # BLD: 4.98 M/UL — SIGNIFICANT CHANGE UP (ref 3.8–5.2)
RBC # FLD: 14.4 % — SIGNIFICANT CHANGE UP (ref 10.3–14.5)
SODIUM SERPL-SCNC: 133 MMOL/L — LOW (ref 135–145)
WBC # BLD: 4.7 K/UL — SIGNIFICANT CHANGE UP (ref 3.8–10.5)
WBC # FLD AUTO: 4.7 K/UL — SIGNIFICANT CHANGE UP (ref 3.8–10.5)

## 2019-06-06 PROCEDURE — 88342 IMHCHEM/IMCYTCHM 1ST ANTB: CPT | Mod: 26

## 2019-06-06 PROCEDURE — 88341 IMHCHEM/IMCYTCHM EA ADD ANTB: CPT | Mod: 26

## 2019-06-06 PROCEDURE — 72149 MRI LUMBAR SPINE W/DYE: CPT | Mod: 26

## 2019-06-06 PROCEDURE — 72196 MRI PELVIS W/DYE: CPT | Mod: 26

## 2019-06-06 PROCEDURE — 43242 EGD US FINE NEEDLE BX/ASPIR: CPT | Mod: GC

## 2019-06-06 PROCEDURE — 88305 TISSUE EXAM BY PATHOLOGIST: CPT | Mod: 26

## 2019-06-06 PROCEDURE — 99233 SBSQ HOSP IP/OBS HIGH 50: CPT

## 2019-06-06 PROCEDURE — 99232 SBSQ HOSP IP/OBS MODERATE 35: CPT

## 2019-06-06 RX ORDER — MORPHINE SULFATE 50 MG/1
4 CAPSULE, EXTENDED RELEASE ORAL EVERY 4 HOURS
Refills: 0 | Status: DISCONTINUED | OUTPATIENT
Start: 2019-06-06 | End: 2019-06-12

## 2019-06-06 RX ORDER — MORPHINE SULFATE 50 MG/1
2 CAPSULE, EXTENDED RELEASE ORAL EVERY 4 HOURS
Refills: 0 | Status: DISCONTINUED | OUTPATIENT
Start: 2019-06-06 | End: 2019-06-12

## 2019-06-06 RX ORDER — HEPARIN SODIUM 5000 [USP'U]/ML
5000 INJECTION INTRAVENOUS; SUBCUTANEOUS EVERY 8 HOURS
Refills: 0 | Status: DISCONTINUED | OUTPATIENT
Start: 2019-06-06 | End: 2019-06-12

## 2019-06-06 RX ADMIN — LISINOPRIL 20 MILLIGRAM(S): 2.5 TABLET ORAL at 07:11

## 2019-06-06 RX ADMIN — SENNA PLUS 2 TABLET(S): 8.6 TABLET ORAL at 21:28

## 2019-06-06 RX ADMIN — Medication 1 DROP(S): at 07:11

## 2019-06-06 RX ADMIN — Medication 1 DROP(S): at 01:05

## 2019-06-06 RX ADMIN — TENOFOVIR DISOPROXIL FUMARATE 300 MILLIGRAM(S): 300 TABLET, FILM COATED ORAL at 18:19

## 2019-06-06 RX ADMIN — AMLODIPINE BESYLATE 5 MILLIGRAM(S): 2.5 TABLET ORAL at 07:11

## 2019-06-06 RX ADMIN — LIDOCAINE 1 PATCH: 4 CREAM TOPICAL at 21:29

## 2019-06-06 RX ADMIN — HEPARIN SODIUM 5000 UNIT(S): 5000 INJECTION INTRAVENOUS; SUBCUTANEOUS at 21:29

## 2019-06-06 RX ADMIN — Medication 1 DROP(S): at 18:16

## 2019-06-06 RX ADMIN — LIDOCAINE 1 PATCH: 4 CREAM TOPICAL at 21:28

## 2019-06-06 NOTE — PROGRESS NOTE ADULT - SUBJECTIVE AND OBJECTIVE BOX
Patient is a 68y old  Female who presents with a chief complaint of abdominal pain (06 Jun 2019 08:07)      SUBJECTIVE / OVERNIGHT EVENTS:    MEDICATIONS  (STANDING):  amLODIPine   Tablet 5 milliGRAM(s) Oral daily  artificial tears (preservative free) Ophthalmic Solution 1 Drop(s) Both EYES four times a day  dextrose 5% + sodium chloride 0.45%. 1000 milliLiter(s) (75 mL/Hr) IV Continuous <Continuous>  docusate sodium 100 milliGRAM(s) Oral three times a day  lisinopril 20 milliGRAM(s) Oral daily  polyethylene glycol 3350 17 Gram(s) Oral daily  senna 2 Tablet(s) Oral at bedtime  tenofovir disoproxil fumarate (VIREAD) 300 milliGRAM(s) Oral daily    MEDICATIONS  (PRN):  acetaminophen   Tablet .. 650 milliGRAM(s) Oral every 6 hours PRN Moderate Pain (4 - 6)  ALBUTerol    90 MICROgram(s) HFA Inhaler 2 Puff(s) Inhalation every 6 hours PRN Shortness of Breath and/or Wheezing  lidocaine   Patch 1 Patch Transdermal every 24 hours PRN Right knee pain  lidocaine   Patch 1 Patch Transdermal every 24 hours PRN Left knee pain  morphine  - Injectable 2 milliGRAM(s) IV Push every 4 hours PRN Moderate Pain (4 - 6)  morphine  - Injectable 4 milliGRAM(s) IV Push every 4 hours PRN Severe Pain (7 - 10)  ondansetron Injectable 4 milliGRAM(s) IV Push every 6 hours PRN Nausea and/or Vomiting        CAPILLARY BLOOD GLUCOSE        I&O's Summary      PHYSICAL EXAM:  GENERAL: NAD, well-developed  HEAD:  Atraumatic, Normocephalic  EYES: EOMI, PERRLA, conjunctiva and sclera clear  NECK: Supple, No JVD  CHEST/LUNG: Clear to auscultation bilaterally; No wheeze  HEART: Regular rate and rhythm; No murmurs, rubs, or gallops  ABDOMEN: Soft, Nontender, Nondistended; Bowel sounds present  EXTREMITIES:  2+ Peripheral Pulses, No clubbing, cyanosis, or edema  PSYCH: AAOx3  NEUROLOGY: non-focal  SKIN: No rashes or lesions    LABS:                        13.0   4.70  )-----------( 208      ( 06 Jun 2019 06:30 )             40.6     06-06    133<L>  |  101  |  13  ----------------------------<  107<H>  4.5   |  21<L>  |  0.63    Ca    8.3<L>      06 Jun 2019 06:30  Phos  3.1     06-06  Mg     2.3     06-06      PT/INR - ( 06 Jun 2019 06:30 )   PT: 11.0 SEC;   INR: 0.99          PTT - ( 06 Jun 2019 06:30 )  PTT:27.9 SEC          RADIOLOGY & ADDITIONAL TESTS:      Imaging Personally Reviewed:    Consultant(s) Notes Reviewed:      Care Discussed with Consultants/Other Providers: Patient is a 68y old  Female who presents with a chief complaint of abdominal pain (06 Jun 2019 08:07)      SUBJECTIVE / OVERNIGHT EVENTS:      MEDICATIONS  (STANDING):  amLODIPine   Tablet 5 milliGRAM(s) Oral daily  artificial tears (preservative free) Ophthalmic Solution 1 Drop(s) Both EYES four times a day  dextrose 5% + sodium chloride 0.45%. 1000 milliLiter(s) (75 mL/Hr) IV Continuous <Continuous>  docusate sodium 100 milliGRAM(s) Oral three times a day  lisinopril 20 milliGRAM(s) Oral daily  polyethylene glycol 3350 17 Gram(s) Oral daily  senna 2 Tablet(s) Oral at bedtime  tenofovir disoproxil fumarate (VIREAD) 300 milliGRAM(s) Oral daily    MEDICATIONS  (PRN):  acetaminophen   Tablet .. 650 milliGRAM(s) Oral every 6 hours PRN Moderate Pain (4 - 6)  ALBUTerol    90 MICROgram(s) HFA Inhaler 2 Puff(s) Inhalation every 6 hours PRN Shortness of Breath and/or Wheezing  lidocaine   Patch 1 Patch Transdermal every 24 hours PRN Right knee pain  lidocaine   Patch 1 Patch Transdermal every 24 hours PRN Left knee pain  morphine  - Injectable 2 milliGRAM(s) IV Push every 4 hours PRN Moderate Pain (4 - 6)  morphine  - Injectable 4 milliGRAM(s) IV Push every 4 hours PRN Severe Pain (7 - 10)  ondansetron Injectable 4 milliGRAM(s) IV Push every 6 hours PRN Nausea and/or Vomiting        CAPILLARY BLOOD GLUCOSE        I&O's Summary      PHYSICAL EXAM:  GENERAL: NAD, well-developed  HEAD:  Atraumatic, Normocephalic  EYES: EOMI, PERRLA, conjunctiva and sclera clear  NECK: Supple, No JVD  CHEST/LUNG: Clear to auscultation bilaterally; No wheeze  HEART: Regular rate and rhythm; No murmurs, rubs, or gallops  ABDOMEN: Soft, Nontender, Nondistended; Bowel sounds present  EXTREMITIES:  2+ Peripheral Pulses, No clubbing, cyanosis, or edema  PSYCH: AAOx3  NEUROLOGY: non-focal  SKIN: No rashes or lesions    LABS:                        13.0   4.70  )-----------( 208      ( 06 Jun 2019 06:30 )             40.6     06-06    133<L>  |  101  |  13  ----------------------------<  107<H>  4.5   |  21<L>  |  0.63    Ca    8.3<L>      06 Jun 2019 06:30  Phos  3.1     06-06  Mg     2.3     06-06      PT/INR - ( 06 Jun 2019 06:30 )   PT: 11.0 SEC;   INR: 0.99          PTT - ( 06 Jun 2019 06:30 )  PTT:27.9 SEC          RADIOLOGY & ADDITIONAL TESTS:      Imaging Personally Reviewed:    Consultant(s) Notes Reviewed:      Care Discussed with Consultants/Other Providers: Patient is a 68y old  Female who presents with a chief complaint of abdominal pain (06 Jun 2019 08:07)      SUBJECTIVE / OVERNIGHT EVENTS:  Comfortable      MEDICATIONS  (STANDING):  amLODIPine   Tablet 5 milliGRAM(s) Oral daily  artificial tears (preservative free) Ophthalmic Solution 1 Drop(s) Both EYES four times a day  dextrose 5% + sodium chloride 0.45%. 1000 milliLiter(s) (75 mL/Hr) IV Continuous <Continuous>  docusate sodium 100 milliGRAM(s) Oral three times a day  lisinopril 20 milliGRAM(s) Oral daily  polyethylene glycol 3350 17 Gram(s) Oral daily  senna 2 Tablet(s) Oral at bedtime  tenofovir disoproxil fumarate (VIREAD) 300 milliGRAM(s) Oral daily    MEDICATIONS  (PRN):  acetaminophen   Tablet .. 650 milliGRAM(s) Oral every 6 hours PRN Moderate Pain (4 - 6)  ALBUTerol    90 MICROgram(s) HFA Inhaler 2 Puff(s) Inhalation every 6 hours PRN Shortness of Breath and/or Wheezing  lidocaine   Patch 1 Patch Transdermal every 24 hours PRN Right knee pain  lidocaine   Patch 1 Patch Transdermal every 24 hours PRN Left knee pain  morphine  - Injectable 2 milliGRAM(s) IV Push every 4 hours PRN Moderate Pain (4 - 6)  morphine  - Injectable 4 milliGRAM(s) IV Push every 4 hours PRN Severe Pain (7 - 10)  ondansetron Injectable 4 milliGRAM(s) IV Push every 6 hours PRN Nausea and/or Vomiting      Vital Signs Last 24 Hrs  T(C): 36.8 (06 Jun 2019 06:31), Max: 37.1 (06 Jun 2019 02:40)  T(F): 98.2 (06 Jun 2019 06:31), Max: 98.7 (06 Jun 2019 02:40)  HR: 65 (06 Jun 2019 06:31) (65 - 73)  BP: 143/68 (06 Jun 2019 06:31) (117/62 - 143/68)  BP(mean): --  RR: 18 (06 Jun 2019 06:31) (16 - 18)  SpO2: 100% (06 Jun 2019 06:31) (99% - 100%)      PHYSICAL EXAM:  GENERAL: NAD, well-developed  HEAD:  Atraumatic, Normocephalic  EYES: EOMI, PERRLA, conjunctiva and sclera clear  NECK: Supple, No JVD  CHEST/LUNG: Clear to auscultation bilaterally; No wheeze  HEART: Regular rate and rhythm; No murmurs, rubs, or gallops  ABDOMEN: Soft, Nontender, Nondistended; Bowel sounds present  EXTREMITIES:  2+ Peripheral Pulses, No clubbing, cyanosis, or edema  PSYCH: AAOx3  NEUROLOGY: non-focal  SKIN: No rashes or lesions    LABS:                        13.0   4.70  )-----------( 208      ( 06 Jun 2019 06:30 )             40.6     06-06    133<L>  |  101  |  13  ----------------------------<  107<H>  4.5   |  21<L>  |  0.63    Ca    8.3<L>      06 Jun 2019 06:30  Phos  3.1     06-06  Mg     2.3     06-06      PT/INR - ( 06 Jun 2019 06:30 )   PT: 11.0 SEC;   INR: 0.99          PTT - ( 06 Jun 2019 06:30 )  PTT:27.9 SEC          RADIOLOGY & ADDITIONAL TESTS:      Imaging Personally Reviewed:    Consultant(s) Notes Reviewed:      Care Discussed with Consultants/Other Providers: Patient is a 68y old  Female who presents with a chief complaint of abdominal pain (06 Jun 2019 08:07)      SUBJECTIVE / OVERNIGHT EVENTS:  Comfortable- going for EUS      MEDICATIONS  (STANDING):  amLODIPine   Tablet 5 milliGRAM(s) Oral daily  artificial tears (preservative free) Ophthalmic Solution 1 Drop(s) Both EYES four times a day  dextrose 5% + sodium chloride 0.45%. 1000 milliLiter(s) (75 mL/Hr) IV Continuous <Continuous>  docusate sodium 100 milliGRAM(s) Oral three times a day  lisinopril 20 milliGRAM(s) Oral daily  polyethylene glycol 3350 17 Gram(s) Oral daily  senna 2 Tablet(s) Oral at bedtime  tenofovir disoproxil fumarate (VIREAD) 300 milliGRAM(s) Oral daily    MEDICATIONS  (PRN):  acetaminophen   Tablet .. 650 milliGRAM(s) Oral every 6 hours PRN Moderate Pain (4 - 6)  ALBUTerol    90 MICROgram(s) HFA Inhaler 2 Puff(s) Inhalation every 6 hours PRN Shortness of Breath and/or Wheezing  lidocaine   Patch 1 Patch Transdermal every 24 hours PRN Right knee pain  lidocaine   Patch 1 Patch Transdermal every 24 hours PRN Left knee pain  morphine  - Injectable 2 milliGRAM(s) IV Push every 4 hours PRN Moderate Pain (4 - 6)  morphine  - Injectable 4 milliGRAM(s) IV Push every 4 hours PRN Severe Pain (7 - 10)  ondansetron Injectable 4 milliGRAM(s) IV Push every 6 hours PRN Nausea and/or Vomiting      Vital Signs Last 24 Hrs  T(C): 36.8 (06 Jun 2019 06:31), Max: 37.1 (06 Jun 2019 02:40)  T(F): 98.2 (06 Jun 2019 06:31), Max: 98.7 (06 Jun 2019 02:40)  HR: 65 (06 Jun 2019 06:31) (65 - 73)  BP: 143/68 (06 Jun 2019 06:31) (117/62 - 143/68)  BP(mean): --  RR: 18 (06 Jun 2019 06:31) (16 - 18)  SpO2: 100% (06 Jun 2019 06:31) (99% - 100%)      PHYSICAL EXAM:  GENERAL: NAD, well-developed  HEAD:  Atraumatic, Normocephalic  EYES: EOMI, PERRLA, conjunctiva and sclera clear  NECK: Supple, No JVD  CHEST/LUNG: Clear to auscultation bilaterally; No wheeze  HEART: Regular rate and rhythm; No murmurs, rubs, or gallops  ABDOMEN: Soft, Nontender, Nondistended; Bowel sounds present  EXTREMITIES:  2+ Peripheral Pulses, No clubbing, cyanosis, or edema  PSYCH: AAOx3  NEUROLOGY: non-focal  SKIN: No rashes or lesions    LABS:                        13.0   4.70  )-----------( 208      ( 06 Jun 2019 06:30 )             40.6     06-06    133<L>  |  101  |  13  ----------------------------<  107<H>  4.5   |  21<L>  |  0.63    Ca    8.3<L>      06 Jun 2019 06:30  Phos  3.1     06-06  Mg     2.3     06-06      PT/INR - ( 06 Jun 2019 06:30 )   PT: 11.0 SEC;   INR: 0.99          PTT - ( 06 Jun 2019 06:30 )  PTT:27.9 SEC          RADIOLOGY & ADDITIONAL TESTS:      Imaging Personally Reviewed:    Consultant(s) Notes Reviewed:      Care Discussed with Consultants/Other Providers:

## 2019-06-06 NOTE — PROGRESS NOTE ADULT - PROBLEM SELECTOR PLAN 1
- pt presented with 5 days of left lower abdominal pain with no clinical signs of infection or obstruction, suggestive of exophytic mass seen on CT as likely cause  -Onc consult appreciated, rec to have medical treatment with her Oncologist  - Awaiting surgical oncology ,Dr Montejo to see pt.   - if no surgical intervention offered, likely home with her oncologist f/u and [facility] for 2nd opinion and ? clinical trial as per Onc consult rec.  - advance diet as tolerated  - recent (+)UA/Cx with GBS, patient asymptomatic, repeat UA pending

## 2019-06-06 NOTE — PROGRESS NOTE ADULT - PROBLEM SELECTOR PLAN 2
(+) hypophosphatemia and hypokalemia.  Will replete Potassium and phos  Will check BMP and phos in AM (+) hypophosphatemia and hypokalemia.  Will replete Potassium and phos  Will check BMP and phos in AM  C-spine xray- 6/5 Done- awaiting results- prelim result- no subluxation

## 2019-06-06 NOTE — PROGRESS NOTE ADULT - ATTENDING COMMENTS
Pt seen on 6/6.  Given concern for bony lesions -- will cancel surgery pending MRI of the lumbar spine and pelvis

## 2019-06-06 NOTE — PROGRESS NOTE ADULT - SUBJECTIVE AND OBJECTIVE BOX
Surgery Team D Daily Progress Note    SUBJECTIVE:   No acute events overnight. Patient did not have EGD yesterday, planned again for today.     OBJECTIVE:   Vital Signs Last 24 Hrs  T(C): 36.8 (06 Jun 2019 06:31), Max: 37 (05 Jun 2019 15:05)  T(F): 98.2 (06 Jun 2019 06:31), Max: 98.6 (05 Jun 2019 15:05)  HR: 65 (06 Jun 2019 06:31) (65 - 73)  BP: 143/68 (06 Jun 2019 06:31) (117/62 - 143/68)  BP(mean): --  RR: 18 (06 Jun 2019 06:31) (16 - 18)  SpO2: 100% (06 Jun 2019 06:31) (99% - 100%)    PHYSICAL EXAM:  General: NAD, resting comfortably  Resp: unlabored breathing on RA  CV: HDS, rrr  Abd: soft, nontender, nondistended  Extr: wwp     CBC (06-06 @ 06:30)                          13.0                     4.70    )--------------(  208        --    % Neuts, --    % Lymphs, ANC: --                              40.6    CBC (06-05 @ 06:25)                          12.9                     4.39    )--------------(  231        52.2  % Neuts, 35.5  % Lymphs, ANC: 2.29                            39.3      BMP (06-06 @ 06:30)       133<L>  |  101     |  13    			Ca++ --      Ca 8.3<L>       ---------------------------------( 107<H>		Mg 2.3          4.5     |  21<L>   |  0.63  			Ph 3.1     BMP (06-05 @ 06:25)       135     |  102     |  10    			Ca++ --      Ca 8.0<L>       ---------------------------------( 112<H>		Mg 2.1          4.4     |  22      |  0.58  			Ph 2.2<L>      Coags (06-06 @ 06:30)  aPTT 27.9 / INR 0.99 / PT 11.0    -> URINE MIDSTREAM Culture (05-29 @ 17:20)     NG    NG  NG    RADIOLOGY & ADDITIONAL STUDIES: no new studies performed

## 2019-06-06 NOTE — CHART NOTE - NSCHARTNOTEFT_GEN_A_CORE
Call received from Dr ANTOINETTE Dela Cruz- kaela- oncology  Patient has a questionably rashi met[place]  They will check MRI of L spine and Pelvic Bone- if mets are present, ptient would not be a candidate for surgical resection of mass.  -f/u MRI Lspine and Pelvic Bone

## 2019-06-06 NOTE — PROGRESS NOTE ADULT - ASSESSMENT
68 year old female with hx of GIST tumor (s/p resection in 2017, intermittently on gleevec -->now on tasigna), HTN, RA who presents with abdominal pain x 5 days with seen exophytic mass in setting of metastatic GIST   Surgery Dr Montejo to see patient today  Solid mass in the gastrohepatic space demonstrates an interval increase   since prior study of 8/16/2018 on CT    -  GI to do EUS for tissue biopsy, operative planning- plan for 6/6  - Patient may be a candidate for gastric resection w/ liver resection vs. ablation 68 year old female with hx of GIST tumor (s/p resection in 2017, intermittently on gleevec -->now on tasigna), HTN, RA who presents with abdominal pain x 5 days with seen exophytic mass in setting of metastatic GIST   Surgery Dr Montejo to see patient today  Solid mass in the gastrohepatic space demonstrates an interval increase   since prior study of 8/16/2018 on CT    -  GI to do EUS for tissue biopsy, operative planning- plan for 6/6  - Patient may be a candidate for gastric resection w/ liver resection vs. ablation    C-spine xray- 6/5 Done- awaiting results 68 year old female with hx of GIST tumor (s/p resection in 2017, intermittently on gleevec -->now on tasigna), HTN, RA who presents with abdominal pain x 5 days with seen exophytic mass in setting of metastatic GIST   Surgery Dr Montejo to see patient today  Solid mass in the gastrohepatic space demonstrates an interval increase   since prior study of 8/16/2018 on CT    -  GI to do EUS for tissue biopsy, operative planning- plan for 6/6  - Patient may be a candidate for gastric resection w/ liver resection vs. ablation    C-spine xray- 6/5 Done- awaiting results- prelim result- no subluxation

## 2019-06-07 ENCOUNTER — APPOINTMENT (OUTPATIENT)
Dept: SURGICAL ONCOLOGY | Facility: HOSPITAL | Age: 69
End: 2019-06-07

## 2019-06-07 ENCOUNTER — TRANSCRIPTION ENCOUNTER (OUTPATIENT)
Age: 69
End: 2019-06-07

## 2019-06-07 LAB
ANION GAP SERPL CALC-SCNC: 15 MMO/L — HIGH (ref 7–14)
APTT BLD: 27.9 SEC — SIGNIFICANT CHANGE UP (ref 27.5–36.3)
BUN SERPL-MCNC: 14 MG/DL — SIGNIFICANT CHANGE UP (ref 7–23)
CALCIUM SERPL-MCNC: 9.5 MG/DL — SIGNIFICANT CHANGE UP (ref 8.4–10.5)
CHLORIDE SERPL-SCNC: 101 MMOL/L — SIGNIFICANT CHANGE UP (ref 98–107)
CO2 SERPL-SCNC: 20 MMOL/L — LOW (ref 22–31)
CREAT SERPL-MCNC: 0.67 MG/DL — SIGNIFICANT CHANGE UP (ref 0.5–1.3)
GLUCOSE SERPL-MCNC: 111 MG/DL — HIGH (ref 70–99)
HCT VFR BLD CALC: 38.1 % — SIGNIFICANT CHANGE UP (ref 34.5–45)
HGB BLD-MCNC: 12 G/DL — SIGNIFICANT CHANGE UP (ref 11.5–15.5)
INR BLD: 1.04 — SIGNIFICANT CHANGE UP (ref 0.88–1.17)
MAGNESIUM SERPL-MCNC: 2.2 MG/DL — SIGNIFICANT CHANGE UP (ref 1.6–2.6)
MCHC RBC-ENTMCNC: 25.8 PG — LOW (ref 27–34)
MCHC RBC-ENTMCNC: 31.5 % — LOW (ref 32–36)
MCV RBC AUTO: 81.9 FL — SIGNIFICANT CHANGE UP (ref 80–100)
NRBC # FLD: 0 K/UL — SIGNIFICANT CHANGE UP (ref 0–0)
PHOSPHATE SERPL-MCNC: 4.1 MG/DL — SIGNIFICANT CHANGE UP (ref 2.5–4.5)
PLATELET # BLD AUTO: 175 K/UL — SIGNIFICANT CHANGE UP (ref 150–400)
PMV BLD: 10.4 FL — SIGNIFICANT CHANGE UP (ref 7–13)
POTASSIUM SERPL-MCNC: 4.7 MMOL/L — SIGNIFICANT CHANGE UP (ref 3.5–5.3)
POTASSIUM SERPL-SCNC: 4.7 MMOL/L — SIGNIFICANT CHANGE UP (ref 3.5–5.3)
PROTHROM AB SERPL-ACNC: 11.6 SEC — SIGNIFICANT CHANGE UP (ref 9.8–13.1)
RBC # BLD: 4.65 M/UL — SIGNIFICANT CHANGE UP (ref 3.8–5.2)
RBC # FLD: 14.2 % — SIGNIFICANT CHANGE UP (ref 10.3–14.5)
SODIUM SERPL-SCNC: 136 MMOL/L — SIGNIFICANT CHANGE UP (ref 135–145)
WBC # BLD: 4.84 K/UL — SIGNIFICANT CHANGE UP (ref 3.8–10.5)
WBC # FLD AUTO: 4.84 K/UL — SIGNIFICANT CHANGE UP (ref 3.8–10.5)

## 2019-06-07 PROCEDURE — 99232 SBSQ HOSP IP/OBS MODERATE 35: CPT

## 2019-06-07 PROCEDURE — 99232 SBSQ HOSP IP/OBS MODERATE 35: CPT | Mod: GC

## 2019-06-07 RX ORDER — ACETAMINOPHEN 500 MG
1000 TABLET ORAL ONCE
Refills: 0 | Status: COMPLETED | OUTPATIENT
Start: 2019-06-07 | End: 2019-06-08

## 2019-06-07 RX ADMIN — LIDOCAINE 1 PATCH: 4 CREAM TOPICAL at 21:50

## 2019-06-07 RX ADMIN — AMLODIPINE BESYLATE 5 MILLIGRAM(S): 2.5 TABLET ORAL at 05:51

## 2019-06-07 RX ADMIN — Medication 1 DROP(S): at 05:51

## 2019-06-07 RX ADMIN — LIDOCAINE 1 PATCH: 4 CREAM TOPICAL at 09:03

## 2019-06-07 RX ADMIN — LIDOCAINE 1 PATCH: 4 CREAM TOPICAL at 09:02

## 2019-06-07 RX ADMIN — LIDOCAINE 1 PATCH: 4 CREAM TOPICAL at 08:01

## 2019-06-07 RX ADMIN — LISINOPRIL 20 MILLIGRAM(S): 2.5 TABLET ORAL at 05:51

## 2019-06-07 RX ADMIN — TENOFOVIR DISOPROXIL FUMARATE 300 MILLIGRAM(S): 300 TABLET, FILM COATED ORAL at 14:00

## 2019-06-07 RX ADMIN — Medication 1 DROP(S): at 14:00

## 2019-06-07 RX ADMIN — Medication 1 DROP(S): at 21:50

## 2019-06-07 NOTE — DIETITIAN INITIAL EVALUATION ADULT. - OTHER INFO
Initial Dietitian Evaluation 2/2 to extended length of stay. 67y/o female with metastatic gastrointestinal stromal tumor (GIST) admitted with abdominal pain, nausea, and vomiting x 5days PTA. Met with patient at bedside. Reports tolerating diet well. Doesn't like taste of certain hospital food. Selecting items as desired from personalized menu. Consuming 50-75% at this time. Patient denies any nausea/vomiting/diarrhea/constipation or difficulty chewing and swallowing. Allergic Bananas, Kiwi, ,Seafood, and Strawberry-same implemented, no other allergies reported. Discussed importance of having balanced meals, nutrient and protein dense foods. Patient requesting for po supplement Ensure.

## 2019-06-07 NOTE — DIETITIAN INITIAL EVALUATION ADULT. - PROBLEM SELECTOR PLAN 5
- hx of RA with left knee pain on plaquenil  - given hx of vision blurriness for the past several months - - will hold off on restarting plaquenil  - opthalmology consult prior to reinitiation

## 2019-06-07 NOTE — PROGRESS NOTE ADULT - ASSESSMENT
Impression:  68 year old woman w/ PMH sig for metastatic GIST who presents with likely recurrent GIST.    #Recurrent GIST, s/p EGD/EUS with biopsy    Recs:  - followup path from [facility]  - followup repeat imaging to assess for bony involvement  - further care per primary team    Please call GI back with any questions.

## 2019-06-07 NOTE — DIETITIAN INITIAL EVALUATION ADULT. - PROBLEM SELECTOR PLAN 6
DVT PPx: HSQ in anticipation of possible surgical intervention    Rowena Kathleen  PGY2  [phone]

## 2019-06-07 NOTE — DIETITIAN INITIAL EVALUATION ADULT. - NS AS NUTRI INTERV STRATEGIES
Please Encourage po intake, assist with meals and menu selections, provide alternatives PRN./Social support

## 2019-06-07 NOTE — DISCHARGE NOTE PROVIDER - CARE PROVIDER_API CALL
Jose Montejo)  Surgery  [address] [place] [address]  Phone: [phone]  Fax: [phone]  Follow Up Time:

## 2019-06-07 NOTE — PROGRESS NOTE ADULT - SUBJECTIVE AND OBJECTIVE BOX
Patient is a 68y old  Female who presents with a chief complaint of abdominal pain (07 Jun 2019 06:16)      SUBJECTIVE / OVERNIGHT EVENTS:  Pain is controlled.  Need MRI results to look for mets- surgery on hold until such results    MEDICATIONS  (STANDING):  amLODIPine   Tablet 5 milliGRAM(s) Oral daily  artificial tears (preservative free) Ophthalmic Solution 1 Drop(s) Both EYES four times a day  dextrose 5% + sodium chloride 0.45%. 1000 milliLiter(s) (75 mL/Hr) IV Continuous <Continuous>  docusate sodium 100 milliGRAM(s) Oral three times a day  heparin  Injectable 5000 Unit(s) SubCutaneous every 8 hours  lisinopril 20 milliGRAM(s) Oral daily  polyethylene glycol 3350 17 Gram(s) Oral daily  senna 2 Tablet(s) Oral at bedtime  tenofovir disoproxil fumarate (VIREAD) 300 milliGRAM(s) Oral daily    MEDICATIONS  (PRN):  acetaminophen   Tablet .. 650 milliGRAM(s) Oral every 6 hours PRN Moderate Pain (4 - 6)  ALBUTerol    90 MICROgram(s) HFA Inhaler 2 Puff(s) Inhalation every 6 hours PRN Shortness of Breath and/or Wheezing  lidocaine   Patch 1 Patch Transdermal every 24 hours PRN Right knee pain  lidocaine   Patch 1 Patch Transdermal every 24 hours PRN Left knee pain  morphine  - Injectable 2 milliGRAM(s) IV Push every 4 hours PRN Moderate Pain (4 - 6)  morphine  - Injectable 4 milliGRAM(s) IV Push every 4 hours PRN Severe Pain (7 - 10)  ondansetron Injectable 4 milliGRAM(s) IV Push every 6 hours PRN Nausea and/or Vomiting      Vital Signs Last 24 Hrs  T(C): 37 (07 Jun 2019 05:45), Max: 37.2 (06 Jun 2019 16:11)  T(F): 98.6 (07 Jun 2019 05:45), Max: 98.9 (06 Jun 2019 16:11)  HR: 65 (07 Jun 2019 05:45) (65 - 69)  BP: 138/63 (07 Jun 2019 05:45) (125/90 - 138/63)  BP(mean): --  RR: 18 (07 Jun 2019 05:45) (18 - 18)  SpO2: 99% (07 Jun 2019 05:45) (98% - 100%)      PHYSICAL EXAM:  GENERAL: NAD, well-developed  HEAD:  Atraumatic, Normocephalic  EYES: EOMI, PERRLA, conjunctiva and sclera clear  NECK: Supple, No JVD  CHEST/LUNG: Clear to auscultation bilaterally; No wheeze  HEART: Regular rate and rhythm; No murmurs, rubs, or gallops  ABDOMEN: Soft, Nontender, Nondistended; Bowel sounds present  EXTREMITIES:  2+ Peripheral Pulses, No clubbing, cyanosis, or edema  PSYCH: AAOx3  NEUROLOGY: non-focal  SKIN: No rashes or lesions    LABS:                        12.0   4.84  )-----------( 175      ( 07 Jun 2019 06:00 )             38.1     06-07    136  |  101  |  14  ----------------------------<  111<H>  4.7   |  20<L>  |  0.67    Ca    9.5      07 Jun 2019 06:00  Phos  4.1     06-07  Mg     2.2     06-07      PT/INR - ( 07 Jun 2019 06:00 )   PT: 11.6 SEC;   INR: 1.04          PTT - ( 07 Jun 2019 06:00 )  PTT:27.9 SEC          RADIOLOGY & ADDITIONAL TESTS:  < from: MR Lumbar Spine w/ IV Cont (06.06.19 @ 20:32) >  Impression: Degenerative changes as described above.          Care Discussed with Consultants/Other Providers:  Dr Montejo

## 2019-06-07 NOTE — DIETITIAN INITIAL EVALUATION ADULT. - PERTINENT MEDS FT
MEDICATIONS  (STANDING):  amLODIPine   Tablet 5 milliGRAM(s) Oral daily  artificial tears (preservative free) Ophthalmic Solution 1 Drop(s) Both EYES four times a day  dextrose 5% + sodium chloride 0.45%. 1000 milliLiter(s) (75 mL/Hr) IV Continuous <Continuous>  docusate sodium 100 milliGRAM(s) Oral three times a day  heparin  Injectable 5000 Unit(s) SubCutaneous every 8 hours  lisinopril 20 milliGRAM(s) Oral daily  polyethylene glycol 3350 17 Gram(s) Oral daily  senna 2 Tablet(s) Oral at bedtime  tenofovir disoproxil fumarate (VIREAD) 300 milliGRAM(s) Oral daily    MEDICATIONS  (PRN):  acetaminophen   Tablet .. 650 milliGRAM(s) Oral every 6 hours PRN Moderate Pain (4 - 6)  ALBUTerol    90 MICROgram(s) HFA Inhaler 2 Puff(s) Inhalation every 6 hours PRN Shortness of Breath and/or Wheezing  lidocaine   Patch 1 Patch Transdermal every 24 hours PRN Right knee pain  lidocaine   Patch 1 Patch Transdermal every 24 hours PRN Left knee pain  morphine  - Injectable 2 milliGRAM(s) IV Push every 4 hours PRN Moderate Pain (4 - 6)  morphine  - Injectable 4 milliGRAM(s) IV Push every 4 hours PRN Severe Pain (7 - 10)  ondansetron Injectable 4 milliGRAM(s) IV Push every 6 hours PRN Nausea and/or Vomiting

## 2019-06-07 NOTE — DIETITIAN INITIAL EVALUATION ADULT. - ENERGY NEEDS
Height (cm): 160.7 (06 Jun 2019 10:05) Weight (kg): 72.9 (06 Jun 2019 10:05)  BMI (kg/m2): 28.2 (06 Jun 2019 10:05) IBW: 125lbs/56.8kg (+/-10%) %IBW: 128%  No edema or pressure injury per flowsheet.

## 2019-06-07 NOTE — PROGRESS NOTE ADULT - ATTENDING COMMENTS
Pain is controlled.  Need MRI results to look for mets- surgery on hold until such results  MRI of L spine 6/6  Impression: Degenerative changes as described above. Pain is controlled.  Need MRI results to look for mets- surgery on hold until such results  MRI of L spine 6/6  Impression: Degenerative changes as described above.  pelvic MRI result- Pending

## 2019-06-07 NOTE — DIETITIAN INITIAL EVALUATION ADULT. - PROBLEM SELECTOR PLAN 3
- patient with prior hep B core Ab reactive and Surface Ab positive suggestive of chronic infection  - will restart viread as per home regimen given that patient is on TKI

## 2019-06-07 NOTE — DIETITIAN INITIAL EVALUATION ADULT. - ORAL INTAKE PTA
fair/reports usually has good appetite and PO intake. However, declined appetite/po intake over 5 days PTA 2/2 abdominal pain, nausea, and vomiting

## 2019-06-07 NOTE — PROGRESS NOTE ADULT - SUBJECTIVE AND OBJECTIVE BOX
Chief Complaint:  Patient is a 68y old  Female who presents with a chief complaint of abdominal pain (07 Jun 2019 03:21)      Interval Events: Patient is s/p EGD/EUS with FNB of the exophytic mass.  Likely recurrent GIST.  Concern for bony mets; pending repeat imaging.     Allergies:  Bananas (Pruritus)  Kiwi (Pruritus)  No Known Drug Allergies  Seafood (Pruritus)  strawberry (Anaphylaxis)      Hospital Medications:  acetaminophen   Tablet .. 650 milliGRAM(s) Oral every 6 hours PRN  ALBUTerol    90 MICROgram(s) HFA Inhaler 2 Puff(s) Inhalation every 6 hours PRN  amLODIPine   Tablet 5 milliGRAM(s) Oral daily  artificial tears (preservative free) Ophthalmic Solution 1 Drop(s) Both EYES four times a day  dextrose 5% + sodium chloride 0.45%. 1000 milliLiter(s) IV Continuous <Continuous>  docusate sodium 100 milliGRAM(s) Oral three times a day  heparin  Injectable 5000 Unit(s) SubCutaneous every 8 hours  lidocaine   Patch 1 Patch Transdermal every 24 hours PRN  lidocaine   Patch 1 Patch Transdermal every 24 hours PRN  lisinopril 20 milliGRAM(s) Oral daily  morphine  - Injectable 2 milliGRAM(s) IV Push every 4 hours PRN  morphine  - Injectable 4 milliGRAM(s) IV Push every 4 hours PRN  ondansetron Injectable 4 milliGRAM(s) IV Push every 6 hours PRN  polyethylene glycol 3350 17 Gram(s) Oral daily  senna 2 Tablet(s) Oral at bedtime  tenofovir disoproxil fumarate (VIREAD) 300 milliGRAM(s) Oral daily      PMHX/PSHX:  Anemia  Thyroid nodule  Stomach cancer  Obese  RA (rheumatoid arthritis)  Stomach neoplasm  Hypertension  H/O laparoscopy  H/O myomectomy  No significant past surgical history      Family history:  No pertinent family history in first degree relatives      ROS:     General:  No wt loss, fevers, chills, night sweats, fatigue,   Eyes:  Good vision, no reported pain  ENT:  No sore throat, pain, runny nose, dysphagia  CV:  No pain, palpitations, hypo/hypertension  Resp:  No dyspnea, cough, tachypnea, wheezing  GI:  See HPI  :  No pain, bleeding, incontinence, nocturia  Muscle:  No pain, weakness  Neuro:  No weakness, tingling, memory problems  Psych:  No fatigue, insomnia, mood problems, depression  Endocrine:  No polyuria, polydipsia, cold/heat intolerance  Heme:  No petechiae, ecchymosis, easy bruisability  Skin:  No rash, edema    PHYSICAL EXAM:     GENERAL: NAD  HEENT:  NC/AT  CHEST:  Full & symmetric excursion, no increased effort  ABDOMEN:  Soft, non-tender, non-distended, +BS  EXTREMITIES:  no edema  SKIN:  No rash  NEURO:  Alert    Vital Signs:  Vital Signs Last 24 Hrs  T(C): 37 (07 Jun 2019 05:45), Max: 37.2 (06 Jun 2019 16:11)  T(F): 98.6 (07 Jun 2019 05:45), Max: 98.9 (06 Jun 2019 16:11)  HR: 65 (07 Jun 2019 05:45) (65 - 69)  BP: 138/63 (07 Jun 2019 05:45) (125/90 - 143/68)  BP(mean): --  RR: 18 (07 Jun 2019 05:45) (18 - 18)  SpO2: 99% (07 Jun 2019 05:45) (98% - 100%)  Daily     Daily     LABS:                        13.0   4.70  )-----------( 208      ( 06 Jun 2019 06:30 )             40.6     06-06    133<L>  |  101  |  13  ----------------------------<  107<H>  4.5   |  21<L>  |  0.63    Ca    8.3<L>      06 Jun 2019 06:30  Phos  3.1     06-06  Mg     2.3     06-06        PT/INR - ( 06 Jun 2019 06:30 )   PT: 11.0 SEC;   INR: 0.99          PTT - ( 06 Jun 2019 06:30 )  PTT:27.9 SEC        Imaging:    < from: Upper EUS (06.06.19 @ 11:47) >    [facility]  _______________________________________________________________________________  Patient Name: Ronnie Dominguez          Procedure Date: 6/6/2019 11:47 AM  MRN: [id number]                     Account Number: [id number]  YOB: 1950              Admit Type: Outpatient  Room: ENDO 01                         Gender: Female  Attending MD: BETTY MONTEZ MD      _______________________________________________________________________________     Procedure:           Upper EUS  Indications:         68 year old with a GIST. Imaging with a gastrohepatic                        ligament mass and a segment 6 liver lesion. EUS                        scheduled per request of surgery to help delineate                    anatomy prior to planned surgery.  Providers:           BETTY MONTEZ MD  Medicines:           Monitored Anesthesia Care  Complications:       No immediate complications.  Procedure:           After obtaining informed consent, theendoscope was                        passed under direct vision. Throughout the procedure,                        the patient's blood pressure, pulse, and oxygen                        saturations were monitored continuously. The was         introduced through the mouth, and advanced to the second                        part of duodenum.                                                                                   Findings:       EGD:       -The esophagus was normal.       -Thestomach had evidenced of post-surgical anatomy. The stomach had no        evidence of extrinsic compression.       -The duodenal bulb and D2 were normal.       EUS: A linear exam was performed.       -A hypoechoic mass was seen in the gastrohepatic ligament space. It        appeared to be outside the stomach muscle wall but it did abut the        gastric cardia. The mass also did not appear to involve the left lobe of        the liver.       -The segment 6 liver lesion seen on the CT could not beseen on EUS due        to anatomy.       -FNB was performed using a 22-gauge needle. A transgastric approach was        used. Doppler was used to avoid a vascular path. Three passes were taken.                           Impression:          EGD:                       -The esophagus was normal.                       -The stomach had evidenced of post-surgical anatomy. The                        stomach had no evidence of extrinsic compression.                       -The duodenal bulb and D2 were normal.                       EUS: A linear exam was performed.                       -A hypoechoic mass was seen in the gastrohepatic                        ligament space. It appeared to be outside the stomach                        muscle wall but it did abut the gastric cardia. The mass                        also did not appear to involve the left lobe of the                        liver.                       -The segment 6 liver lesion seen on the CT could not be                        seen on EUS due to anatomy.                       --FNB was performed using a 22-gauge needle. A                        transgastric approach was used. Doppler was used to                        avoida vascular path. Three passes were taken.  Recommendation:      - Return patient to [facility] for [facility] care.                       - Follow up cytology.                                                                                     ___________________  BETTY MONTEZ MD  6/6/2019 12:55:44 PM  This report has been signed electronically.  Number of Addenda: 0    Note Initiated On: 6/6/2019 11:47 AM    < end of copied text >

## 2019-06-07 NOTE — PROGRESS NOTE ADULT - ASSESSMENT
68 year old female with hx of GIST tumor (s/p resection in 2017, intermittently on gleevec -->now on tasigna), HTN, RA who presents with abdominal pain x 5 days with seen exophytic mass in setting of metastatic GIST   Surgery Dr Montejo to see patient today  Solid mass in the gastrohepatic space demonstrates an interval increase   since prior study of 8/16/2018 on CT    -  GI to do EUS for tissue biopsy, operative planning- plan for 6/6  - Patient may be a candidate for gastric resection w/ liver resection vs. ablation    C-spine xray- 6/5 Done- awaiting results- prelim result- no subluxation    MRI of L spine 6/6  Impression: Degenerative changes as described above.

## 2019-06-07 NOTE — PROVIDER CONTACT NOTE (OTHER) - SITUATION
Patient refuses any pain meds at this time and states she just "wanted us to know." Patient states pain started in MRI 6/6

## 2019-06-07 NOTE — PROGRESS NOTE ADULT - PROBLEM SELECTOR PLAN 2
(+) hypophosphatemia and hypokalemia.  Will replete Potassium and phos  Will check BMP and phos in AM  C-spine xray- 6/5 Done- awaiting results- prelim result- no subluxation

## 2019-06-07 NOTE — PROGRESS NOTE ADULT - PROBLEM SELECTOR PLAN 1
- pt presented with 5 days of left lower abdominal pain with no clinical signs of infection or obstruction, suggestive of exophytic mass seen on CT as likely cause  -Onc consult appreciated, rec to have medical treatment with her Oncologist  - Awaiting surgical oncology ,Dr Montejo to see pt.   - if no surgical intervention offered, likely home with her oncologist f/u and [facility] for 2nd opinion and ? clinical trial as per Onc consult rec.  - advance diet as tolerated  - recent (+)UA/Cx with GBS, patient asymptomatic, repeat UA pending

## 2019-06-07 NOTE — PROGRESS NOTE ADULT - SUBJECTIVE AND OBJECTIVE BOX
Surgery Team D Daily Progress Note    SUBJECTIVE:   No acute events overnight. Patient did not have EGD yesterday, planned again for today.     OBJECTIVE:   Vital Signs Last 24 Hrs  T(C): 36.9 (06 Jun 2019 21:20), Max: 37.2 (06 Jun 2019 16:11)  T(F): 98.4 (06 Jun 2019 21:20), Max: 98.9 (06 Jun 2019 16:11)  HR: 69 (06 Jun 2019 21:20) (65 - 69)  BP: 132/71 (06 Jun 2019 21:20) (125/90 - 143/68)  BP(mean): --  RR: 18 (06 Jun 2019 21:20) (18 - 18)  SpO2: 100% (06 Jun 2019 21:20) (98% - 100%)    PHYSICAL EXAM:  General: NAD, resting comfortably  Resp: unlabored breathing on RA  CV: HDS, rrr  Abd: soft, nontender, nondistended  Extr: wwp     CBC (06-06 @ 06:30)                          13.0                     4.70    )--------------(  208        --    % Neuts, --    % Lymphs, ANC: --                              40.6      BMP (06-06 @ 06:30)       133<L>  |  101     |  13    			Ca++ --      Ca 8.3<L>       ---------------------------------( 107<H>		Mg 2.3          4.5     |  21<L>   |  0.63  			Ph 3.1       Coags (06-06 @ 06:30)  aPTT 27.9 / INR 0.99 / PT 11.0    > URINE MIDSTREAM Culture (05-29 @ 17:20)     NG    NG  NG Surgery Team D Daily Progress Note    SUBJECTIVE:   No acute events overnight. Patient is now s/p EGD, will follow up pathology results. Patient reports she is tolerating a diet well.     OBJECTIVE:   Vital Signs Last 24 Hrs  T(C): 36.9 (06 Jun 2019 21:20), Max: 37.2 (06 Jun 2019 16:11)  T(F): 98.4 (06 Jun 2019 21:20), Max: 98.9 (06 Jun 2019 16:11)  HR: 69 (06 Jun 2019 21:20) (65 - 69)  BP: 132/71 (06 Jun 2019 21:20) (125/90 - 143/68)  BP(mean): --  RR: 18 (06 Jun 2019 21:20) (18 - 18)  SpO2: 100% (06 Jun 2019 21:20) (98% - 100%)    PHYSICAL EXAM:  General: NAD, resting comfortably  Resp: unlabored breathing on RA  CV: HDS, rrr  Abd: soft, nontender, nondistended  Extr: wwp     CBC (06-06 @ 06:30)                          13.0                     4.70    )--------------(  208        --    % Neuts, --    % Lymphs, ANC: --                              40.6      BMP (06-06 @ 06:30)       133<L>  |  101     |  13    			Ca++ --      Ca 8.3<L>       ---------------------------------( 107<H>		Mg 2.3          4.5     |  21<L>   |  0.63  			Ph 3.1       Coags (06-06 @ 06:30)  aPTT 27.9 / INR 0.99 / PT 11.0    > URINE MIDSTREAM Culture (05-29 @ 17:20)     NG    NG  NG

## 2019-06-07 NOTE — DISCHARGE NOTE PROVIDER - HOSPITAL COURSE
Dx: metastatic GIST p/w abd pain     68 year old female with hx of GIST tumor (s/p resection in 2017, intermittently on gleevec -->now on tasigna), HTN, RA who presents with abdominal pain x 5 days with seen exophytic mass in setting of metastatic GIST             Hospital Course: Dx: metastatic GIST p/w abd pain     68 year old female with hx of GIST tumor (s/p resection in 2017, intermittently on gleevec -->now on tasigna), HTN, RA who presents with abdominal pain x 5 days with seen exophytic mass in setting of metastatic GIST             Hospital Course:        GIST, malignant.      -GIST metastatic to liver    - hx of GIST diagnosed in 2017 s/p resection and previously on Gleevec which was discontinued 2/2 edema, now on tasigna with CT scans indicative of worsening metastatic disease     - Onc Consult: rec 2nd opinion @ [facility] for clinicial trial     - Surg Onc consulted-- Progression of metastatic GIST----------------------------    -6/5-EUS for BX--bx results confirmed GIST     -MRI of Pelvis/Lumbosacral spine--No aggressive osseous lesion. Nonaggressive chondroid lesion in the left greater trochanter. Hemangioma within the right L3 vertebral body.Patchy T2 signal hyperintensity within the pelvis likely reflects red marrow reconversion. Degenerative changes.          Abdominal pain.      - Pain control     - GI and surg onc as above     - recent (+)UA/Cx with GBS, patient asymptomatic, repeat UA bland              Hepatitis B virus infection, unspecified chronicity.      - patient with prior hep B core Ab reactive and Surface Ab positive suggestive of chronic infection    - will restart viread as per home regimen given that patient is on TKI.              Hypertension.      - Essential hypertension    - c/w home regimen antihypertensives: amlodipine and lisinopril.             RA (rheumatoid arthritis).    - hx of RA with left knee pain on plaquenil    - given hx of vision blurriness for the past several months - - will hold off on restarting plaquenil    - opthalmology consulted for blurred vision- unlikely 2/2 plaquenil, outpatient follow up     - Blurred vision resolved, monitor clinically             -Preventive measure. Plan: DVT PPx: HSQ        Dispo: Dx: metastatic GIST p/w abd pain     68 year old female with hx of GIST tumor (s/p resection in 2017, intermittently on gleevec -->now on tasigna), HTN, RA who presents with abdominal pain x 5 days with seen exophytic mass in setting of metastatic GIST             Hospital Course:        GIST, malignant.      -GIST metastatic to liver    - hx of GIST diagnosed in 2017 s/p resection and previously on Gleevec which was discontinued 2/2 edema, now on tasigna with CT scans indicative of worsening metastatic disease     - Onc Consult: rec 2nd opinion @ [facility] for clinicial trial     - Surg Onc consulted-- Progression of metastatic GIST----------------------------    -6/5-EUS for BX--bx results confirmed GIST     -MRI of Pelvis/Lumbosacral spine--No aggressive osseous lesion. Nonaggressive chondroid lesion in the left greater trochanter. Hemangioma within the right L3 vertebral body.Patchy T2 signal hyperintensity within the pelvis likely reflects red marrow reconversion. Degenerative changes.          Abdominal pain.      - Pain control     - GI and surg onc as above     - recent (+)UA/Cx with GBS, patient asymptomatic, repeat UA bland              Hepatitis B virus infection, unspecified chronicity.      - patient with prior hep B core Ab reactive and Surface Ab positive suggestive of chronic infection    - will restart viread as per home regimen given that patient is on TKI.              Hypertension.      - Essential hypertension    - c/w home regimen antihypertensives: amlodipine and lisinopril.             RA (rheumatoid arthritis).    - hx of RA with left knee pain on plaquenil    - given hx of vision blurriness for the past several months - - will hold off on restarting plaquenil    - opthalmology consulted for blurred vision- unlikely 2/2 plaquenil, outpatient follow up     - Blurred vision resolved, monitor clinically             -Preventive measure. Plan: DVT PPx: HSQ        Dispo: Home Dx: metastatic GIST p/w abd pain     68 year old female with hx of GIST tumor (s/p resection in 2017, intermittently on gleevec -->now on tasigna), HTN, RA who presents with abdominal pain x 5 days with seen exophytic mass in setting of metastatic GIST             Hospital Course:        GIST, malignant.      -GIST metastatic to liver    - hx of GIST diagnosed in 2017 s/p resection and previously on Gleevec which was discontinued 2/2 edema, now on tasigna with CT scans indicative of worsening metastatic disease     - Onc Consult: rec 2nd opinion @ [facility] for clinicial trial     - Surg Onc consulted-- Progression of metastatic GIST----------------------------    -6/5-EUS for BX--bx results confirmed GIST     -MRI of Pelvis/Lumbosacral spine--No aggressive osseous lesion. Nonaggressive chondroid lesion in the left greater trochanter. Hemangioma within the right L3 vertebral body.Patchy T2 signal hyperintensity within the pelvis likely reflects red marrow reconversion. Degenerative changes.     -Hold Tisgna until seen by Oncologist              Abdominal pain.      - Pain control     - GI and surg onc as above     - recent (+)UA/Cx with GBS, patient asymptomatic, repeat UA bland              Hepatitis B virus infection, unspecified chronicity.      - patient with prior hep B core Ab reactive and Surface Ab positive suggestive of chronic infection    - will restart viread as per home regimen given that patient is on TKI.              Hypertension.      - Essential hypertension    - c/w home regimen antihypertensives: amlodipine and lisinopril.             RA (rheumatoid arthritis).    - hx of RA with left knee pain on plaquenil    - given hx of vision blurriness for the past several months - - will hold off on restarting plaquenil    - opthalmology consulted for blurred vision- unlikely 2/2 plaquenil, outpatient follow up     - Blurred vision resolved, monitor clinically             -Preventive measure. Plan: DVT PPx: HSQ        Dispo: Home

## 2019-06-07 NOTE — DIETITIAN INITIAL EVALUATION ADULT. - PERTINENT LABORATORY DATA
06-07 Na136 mmol/L Glu 111 mg/dL<H> K+ 4.7 mmol/L Cr  0.67 mg/dL BUN 14 mg/dL 06-07 Phos 4.1 mg/dL 06-02 Alb 4.0 g/dL

## 2019-06-07 NOTE — PROGRESS NOTE ADULT - ASSESSMENT
ASSESSMENT:  68 year old woman w/ PMH sig for metastatic GIST who presents with abdominal pain, pending eval for possible pathologic fracture of R pubic ramus and concern for metastasis to L3. Patient requires further evaluation for concern of bony metastasis prior to any surgical intervention.     - Will postpone surgical intervention at this time  - MRI lumbosacral spine + pelvis completed, pending final radiology read  - Will continue to follow    Discussed with Dr. Gustabo Zaldivar PGY-2  Surgery Pager [phone] ASSESSMENT:  68 year old woman w/ PMH sig for metastatic GIST who presents with abdominal pain, pending eval for possible pathologic fracture of R pubic ramus and concern for metastasis to L3. Patient requires further evaluation for concern of bony metastasis prior to any surgical intervention.     - Will postpone surgical intervention at this time  - MRI lumbosacral spine + pelvis completed, pending final radiology read  - F/u pathology results from EUS 6/6  - Will continue to follow    Discussed with Dr. Gustabo Zaldivar PGY-2  Surgery Pager [phone]

## 2019-06-07 NOTE — PROGRESS NOTE ADULT - SUBJECTIVE AND OBJECTIVE BOX
ANESTHESIA POSTOP CHECK    68y Female POSTOP DAY 1 S/P EGD/EUS    Vital Signs Last 24 Hrs  T(C): 37 (07 Jun 2019 05:45), Max: 37.2 (06 Jun 2019 16:11)  T(F): 98.6 (07 Jun 2019 05:45), Max: 98.9 (06 Jun 2019 16:11)  HR: 65 (07 Jun 2019 05:45) (65 - 69)  BP: 138/63 (07 Jun 2019 05:45) (125/90 - 138/63)  BP(mean): --  RR: 18 (07 Jun 2019 05:45) (18 - 18)  SpO2: 99% (07 Jun 2019 05:45) (98% - 100%)  I&O's Summary      [x ] NO APPARENT ANESTHESIA COMPLICATIONS      Comments:

## 2019-06-07 NOTE — DISCHARGE NOTE PROVIDER - NSDCCPCAREPLAN_GEN_ALL_CORE_FT
PRINCIPAL DISCHARGE DIAGNOSIS  Diagnosis: GIST, malignant  Assessment and Plan of Treatment:       SECONDARY DISCHARGE DIAGNOSES  Diagnosis: Hypertension  Assessment and Plan of Treatment: Hypertension PRINCIPAL DISCHARGE DIAGNOSIS  Diagnosis: GIST, malignant  Assessment and Plan of Treatment: Outpatient follow up with surgical oncology Dr. Montejo.      SECONDARY DISCHARGE DIAGNOSES  Diagnosis: Hepatitis B virus infection, unspecified chronicity  Assessment and Plan of Treatment: Continue viread    Diagnosis: Hypertension  Assessment and Plan of Treatment: Continue current blood pressure medication regimen as directed. Monitor for any visual changes, headaches or dizziness.  Monitor blood pressure regularly.  Follow up with your PCP for further management for high blood pressure, please call to make appointment within 1 week of discharge

## 2019-06-07 NOTE — DIETITIAN INITIAL EVALUATION ADULT. - PROBLEM SELECTOR PLAN 2
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

## 2019-06-08 LAB
ANION GAP SERPL CALC-SCNC: 14 MMO/L — SIGNIFICANT CHANGE UP (ref 7–14)
BASOPHILS # BLD AUTO: 0.02 K/UL — SIGNIFICANT CHANGE UP (ref 0–0.2)
BASOPHILS NFR BLD AUTO: 0.4 % — SIGNIFICANT CHANGE UP (ref 0–2)
BUN SERPL-MCNC: 12 MG/DL — SIGNIFICANT CHANGE UP (ref 7–23)
CALCIUM SERPL-MCNC: 8.6 MG/DL — SIGNIFICANT CHANGE UP (ref 8.4–10.5)
CHLORIDE SERPL-SCNC: 99 MMOL/L — SIGNIFICANT CHANGE UP (ref 98–107)
CO2 SERPL-SCNC: 22 MMOL/L — SIGNIFICANT CHANGE UP (ref 22–31)
CREAT SERPL-MCNC: 0.59 MG/DL — SIGNIFICANT CHANGE UP (ref 0.5–1.3)
EOSINOPHIL # BLD AUTO: 0.15 K/UL — SIGNIFICANT CHANGE UP (ref 0–0.5)
EOSINOPHIL NFR BLD AUTO: 3.2 % — SIGNIFICANT CHANGE UP (ref 0–6)
GLUCOSE SERPL-MCNC: 105 MG/DL — HIGH (ref 70–99)
HCT VFR BLD CALC: 37.3 % — SIGNIFICANT CHANGE UP (ref 34.5–45)
HGB BLD-MCNC: 11.9 G/DL — SIGNIFICANT CHANGE UP (ref 11.5–15.5)
IMM GRANULOCYTES NFR BLD AUTO: 0.2 % — SIGNIFICANT CHANGE UP (ref 0–1.5)
LYMPHOCYTES # BLD AUTO: 1.73 K/UL — SIGNIFICANT CHANGE UP (ref 1–3.3)
LYMPHOCYTES # BLD AUTO: 36.8 % — SIGNIFICANT CHANGE UP (ref 13–44)
MCHC RBC-ENTMCNC: 25.9 PG — LOW (ref 27–34)
MCHC RBC-ENTMCNC: 31.9 % — LOW (ref 32–36)
MCV RBC AUTO: 81.3 FL — SIGNIFICANT CHANGE UP (ref 80–100)
MONOCYTES # BLD AUTO: 0.49 K/UL — SIGNIFICANT CHANGE UP (ref 0–0.9)
MONOCYTES NFR BLD AUTO: 10.4 % — SIGNIFICANT CHANGE UP (ref 2–14)
NEUTROPHILS # BLD AUTO: 2.3 K/UL — SIGNIFICANT CHANGE UP (ref 1.8–7.4)
NEUTROPHILS NFR BLD AUTO: 49 % — SIGNIFICANT CHANGE UP (ref 43–77)
NRBC # FLD: 0 K/UL — SIGNIFICANT CHANGE UP (ref 0–0)
PLATELET # BLD AUTO: 196 K/UL — SIGNIFICANT CHANGE UP (ref 150–400)
PMV BLD: 10.6 FL — SIGNIFICANT CHANGE UP (ref 7–13)
POTASSIUM SERPL-MCNC: 3.9 MMOL/L — SIGNIFICANT CHANGE UP (ref 3.5–5.3)
POTASSIUM SERPL-SCNC: 3.9 MMOL/L — SIGNIFICANT CHANGE UP (ref 3.5–5.3)
RBC # BLD: 4.59 M/UL — SIGNIFICANT CHANGE UP (ref 3.8–5.2)
RBC # FLD: 14.1 % — SIGNIFICANT CHANGE UP (ref 10.3–14.5)
SODIUM SERPL-SCNC: 135 MMOL/L — SIGNIFICANT CHANGE UP (ref 135–145)
WBC # BLD: 4.7 K/UL — SIGNIFICANT CHANGE UP (ref 3.8–10.5)
WBC # FLD AUTO: 4.7 K/UL — SIGNIFICANT CHANGE UP (ref 3.8–10.5)

## 2019-06-08 PROCEDURE — 99233 SBSQ HOSP IP/OBS HIGH 50: CPT

## 2019-06-08 RX ADMIN — SENNA PLUS 2 TABLET(S): 8.6 TABLET ORAL at 22:48

## 2019-06-08 RX ADMIN — Medication 1 DROP(S): at 06:30

## 2019-06-08 RX ADMIN — Medication 1 DROP(S): at 17:51

## 2019-06-08 RX ADMIN — Medication 400 MILLIGRAM(S): at 00:53

## 2019-06-08 RX ADMIN — HEPARIN SODIUM 5000 UNIT(S): 5000 INJECTION INTRAVENOUS; SUBCUTANEOUS at 06:29

## 2019-06-08 RX ADMIN — Medication 1 DROP(S): at 13:37

## 2019-06-08 RX ADMIN — TENOFOVIR DISOPROXIL FUMARATE 300 MILLIGRAM(S): 300 TABLET, FILM COATED ORAL at 12:01

## 2019-06-08 RX ADMIN — LIDOCAINE 1 PATCH: 4 CREAM TOPICAL at 22:49

## 2019-06-08 RX ADMIN — LISINOPRIL 20 MILLIGRAM(S): 2.5 TABLET ORAL at 06:29

## 2019-06-08 RX ADMIN — LIDOCAINE 1 PATCH: 4 CREAM TOPICAL at 10:07

## 2019-06-08 RX ADMIN — HEPARIN SODIUM 5000 UNIT(S): 5000 INJECTION INTRAVENOUS; SUBCUTANEOUS at 22:48

## 2019-06-08 RX ADMIN — Medication 1000 MILLIGRAM(S): at 01:50

## 2019-06-08 RX ADMIN — AMLODIPINE BESYLATE 5 MILLIGRAM(S): 2.5 TABLET ORAL at 06:28

## 2019-06-08 RX ADMIN — LIDOCAINE 1 PATCH: 4 CREAM TOPICAL at 06:28

## 2019-06-08 RX ADMIN — LIDOCAINE 1 PATCH: 4 CREAM TOPICAL at 06:27

## 2019-06-08 NOTE — PROGRESS NOTE ADULT - ATTENDING COMMENTS
Pain is controlled. Pain is controlled.  DC planning because MRI pelvis is negative for any acute metastatic disease can follow-up with the oncologist and the surgical team as outpatient

## 2019-06-08 NOTE — PROGRESS NOTE ADULT - SUBJECTIVE AND OBJECTIVE BOX
Patient is a 68y old  Female who presents with a chief complaint of abdominal pain (07 Jun 2019 06:16)      Patient seen and examined at the bedside she stated she is feeling much better denies any chest pain palpitations or nausea or vomiting  MRI reviewed      1.  No aggressive osseous lesion.  2.  Nonaggressive chondroid lesion in the left greater trochanter.   Hemangioma within the right L3 vertebral body.  3.  Patchy T2 signal hyperintensity within the pelvis likely reflects red   marrow reconversion.  4.  Degenerative changes as above.        MEDICATIONS  (STANDING):  amLODIPine   Tablet 5 milliGRAM(s) Oral daily  artificial tears (preservative free) Ophthalmic Solution 1 Drop(s) Both EYES four times a day  dextrose 5% + sodium chloride 0.45%. 1000 milliLiter(s) (75 mL/Hr) IV Continuous <Continuous>  docusate sodium 100 milliGRAM(s) Oral three times a day  heparin  Injectable 5000 Unit(s) SubCutaneous every 8 hours  lisinopril 20 milliGRAM(s) Oral daily  polyethylene glycol 3350 17 Gram(s) Oral daily  senna 2 Tablet(s) Oral at bedtime  tenofovir disoproxil fumarate (VIREAD) 300 milliGRAM(s) Oral daily    MEDICATIONS  (PRN):  acetaminophen   Tablet .. 650 milliGRAM(s) Oral every 6 hours PRN Moderate Pain (4 - 6)  ALBUTerol    90 MICROgram(s) HFA Inhaler 2 Puff(s) Inhalation every 6 hours PRN Shortness of Breath and/or Wheezing  lidocaine   Patch 1 Patch Transdermal every 24 hours PRN Right knee pain  lidocaine   Patch 1 Patch Transdermal every 24 hours PRN Left knee pain  morphine  - Injectable 2 milliGRAM(s) IV Push every 4 hours PRN Moderate Pain (4 - 6)  morphine  - Injectable 4 milliGRAM(s) IV Push every 4 hours PRN Severe Pain (7 - 10)  ondansetron Injectable 4 milliGRAM(s) IV Push every 6 hours PRN Nausea and/or Vomiting        Vital Signs Last 24 Hrs  T(C): 36.6 (08 Jun 2019 06:22), Max: 36.9 (07 Jun 2019 14:53)  T(F): 97.9 (08 Jun 2019 06:22), Max: 98.4 (07 Jun 2019 14:53)  HR: 65 (08 Jun 2019 06:22) (65 - 68)  BP: 127/61 (08 Jun 2019 06:22) (124/65 - 141/72)  BP(mean): --  RR: 16 (08 Jun 2019 06:22) (16 - 17)  SpO2: 100% (08 Jun 2019 06:22) (100% - 100%)      PHYSICAL EXAM:  GENERAL: NAD, well-developed  HEAD:  Atraumatic, Normocephalic  EYES: EOMI, PERRLA, conjunctiva and sclera clear  NECK: Supple, No JVD  CHEST/LUNG: Clear to auscultation bilaterally; No wheeze  HEART: Regular rate and rhythm; No murmurs, rubs, or gallops  ABDOMEN: Soft, Nontender, Nondistended; Bowel sounds present  EXTREMITIES:  2+ Peripheral Pulses, No clubbing, cyanosis, or edema  PSYCH: AAOx3  NEUROLOGY: non-focal  SKIN: No rashes or lesions    LABS:                                              11.9   4.70  )-----------( 196      ( 08 Jun 2019 06:15 )             37.3          06-08    135  |  99  |  12  ----------------------------<  105<H>  3.9   |  22  |  0.59    Ca    8.6      08 Jun 2019 06:15  Phos  4.1     06-07  Mg     2.2     06-07      MRI :    IMPRESSION:    1.  No aggressive osseous lesion.  2.  Nonaggressive chondroid lesion in the left greater trochanter.   Hemangioma within the right L3 vertebral body.  3.  Patchy T2 signal hyperintensity within the pelvis likely reflects red   marrow reconversion.  4.  Degenerative changes as above.

## 2019-06-08 NOTE — PROGRESS NOTE ADULT - PROBLEM SELECTOR PLAN 1
- pt presented with 5 days of left lower abdominal pain with no clinical signs of infection or obstruction, suggestive of exophytic mass seen on CT as likely cause  -Onc consult appreciated, rec to have medical treatment with her Oncologist  - Awaiting surgical oncology ,Dr Montejo to see pt.   - if no surgical intervention offered, likely home with her oncologist f/u and [facility] [place] for 2nd opinion and ? clinical trial as per Onc consult rec.  - advance diet as tolerated  - recent (+)UA/Cx with GBS, patient asymptomatic, repeat UA pending

## 2019-06-09 LAB
ALBUMIN SERPL ELPH-MCNC: 3.9 G/DL — SIGNIFICANT CHANGE UP (ref 3.3–5)
ALP SERPL-CCNC: 47 U/L — SIGNIFICANT CHANGE UP (ref 40–120)
ALT FLD-CCNC: 13 U/L — SIGNIFICANT CHANGE UP (ref 4–33)
ANION GAP SERPL CALC-SCNC: 12 MMO/L — SIGNIFICANT CHANGE UP (ref 7–14)
AST SERPL-CCNC: 19 U/L — SIGNIFICANT CHANGE UP (ref 4–32)
BASOPHILS # BLD AUTO: 0.01 K/UL — SIGNIFICANT CHANGE UP (ref 0–0.2)
BASOPHILS NFR BLD AUTO: 0.2 % — SIGNIFICANT CHANGE UP (ref 0–2)
BILIRUB SERPL-MCNC: 0.3 MG/DL — SIGNIFICANT CHANGE UP (ref 0.2–1.2)
BUN SERPL-MCNC: 11 MG/DL — SIGNIFICANT CHANGE UP (ref 7–23)
CALCIUM SERPL-MCNC: 9 MG/DL — SIGNIFICANT CHANGE UP (ref 8.4–10.5)
CHLORIDE SERPL-SCNC: 102 MMOL/L — SIGNIFICANT CHANGE UP (ref 98–107)
CO2 SERPL-SCNC: 22 MMOL/L — SIGNIFICANT CHANGE UP (ref 22–31)
CREAT SERPL-MCNC: 0.57 MG/DL — SIGNIFICANT CHANGE UP (ref 0.5–1.3)
EOSINOPHIL # BLD AUTO: 0.11 K/UL — SIGNIFICANT CHANGE UP (ref 0–0.5)
EOSINOPHIL NFR BLD AUTO: 2.3 % — SIGNIFICANT CHANGE UP (ref 0–6)
GLUCOSE SERPL-MCNC: 118 MG/DL — HIGH (ref 70–99)
HCT VFR BLD CALC: 37.3 % — SIGNIFICANT CHANGE UP (ref 34.5–45)
HGB BLD-MCNC: 11.9 G/DL — SIGNIFICANT CHANGE UP (ref 11.5–15.5)
IMM GRANULOCYTES NFR BLD AUTO: 0.4 % — SIGNIFICANT CHANGE UP (ref 0–1.5)
LYMPHOCYTES # BLD AUTO: 1.12 K/UL — SIGNIFICANT CHANGE UP (ref 1–3.3)
LYMPHOCYTES # BLD AUTO: 23.7 % — SIGNIFICANT CHANGE UP (ref 13–44)
MAGNESIUM SERPL-MCNC: 2.1 MG/DL — SIGNIFICANT CHANGE UP (ref 1.6–2.6)
MCHC RBC-ENTMCNC: 26.2 PG — LOW (ref 27–34)
MCHC RBC-ENTMCNC: 31.9 % — LOW (ref 32–36)
MCV RBC AUTO: 82 FL — SIGNIFICANT CHANGE UP (ref 80–100)
MONOCYTES # BLD AUTO: 0.52 K/UL — SIGNIFICANT CHANGE UP (ref 0–0.9)
MONOCYTES NFR BLD AUTO: 11 % — SIGNIFICANT CHANGE UP (ref 2–14)
NEUTROPHILS # BLD AUTO: 2.94 K/UL — SIGNIFICANT CHANGE UP (ref 1.8–7.4)
NEUTROPHILS NFR BLD AUTO: 62.4 % — SIGNIFICANT CHANGE UP (ref 43–77)
NRBC # FLD: 0 K/UL — SIGNIFICANT CHANGE UP (ref 0–0)
PHOSPHATE SERPL-MCNC: 3 MG/DL — SIGNIFICANT CHANGE UP (ref 2.5–4.5)
PLATELET # BLD AUTO: 181 K/UL — SIGNIFICANT CHANGE UP (ref 150–400)
PMV BLD: 10.8 FL — SIGNIFICANT CHANGE UP (ref 7–13)
POTASSIUM SERPL-MCNC: 4 MMOL/L — SIGNIFICANT CHANGE UP (ref 3.5–5.3)
POTASSIUM SERPL-SCNC: 4 MMOL/L — SIGNIFICANT CHANGE UP (ref 3.5–5.3)
PROT SERPL-MCNC: 6.4 G/DL — SIGNIFICANT CHANGE UP (ref 6–8.3)
RBC # BLD: 4.55 M/UL — SIGNIFICANT CHANGE UP (ref 3.8–5.2)
RBC # FLD: 14.2 % — SIGNIFICANT CHANGE UP (ref 10.3–14.5)
SODIUM SERPL-SCNC: 136 MMOL/L — SIGNIFICANT CHANGE UP (ref 135–145)
WBC # BLD: 4.72 K/UL — SIGNIFICANT CHANGE UP (ref 3.8–10.5)
WBC # FLD AUTO: 4.72 K/UL — SIGNIFICANT CHANGE UP (ref 3.8–10.5)

## 2019-06-09 PROCEDURE — 99233 SBSQ HOSP IP/OBS HIGH 50: CPT

## 2019-06-09 PROCEDURE — 99232 SBSQ HOSP IP/OBS MODERATE 35: CPT

## 2019-06-09 RX ADMIN — LIDOCAINE 1 PATCH: 4 CREAM TOPICAL at 11:24

## 2019-06-09 RX ADMIN — LIDOCAINE 1 PATCH: 4 CREAM TOPICAL at 07:00

## 2019-06-09 RX ADMIN — Medication 1 DROP(S): at 06:44

## 2019-06-09 RX ADMIN — Medication 1 DROP(S): at 00:39

## 2019-06-09 RX ADMIN — SENNA PLUS 2 TABLET(S): 8.6 TABLET ORAL at 21:33

## 2019-06-09 RX ADMIN — Medication 1 DROP(S): at 21:32

## 2019-06-09 RX ADMIN — AMLODIPINE BESYLATE 5 MILLIGRAM(S): 2.5 TABLET ORAL at 06:42

## 2019-06-09 RX ADMIN — LIDOCAINE 1 PATCH: 4 CREAM TOPICAL at 21:33

## 2019-06-09 RX ADMIN — LISINOPRIL 20 MILLIGRAM(S): 2.5 TABLET ORAL at 06:43

## 2019-06-09 RX ADMIN — Medication 1 DROP(S): at 13:01

## 2019-06-09 RX ADMIN — HEPARIN SODIUM 5000 UNIT(S): 5000 INJECTION INTRAVENOUS; SUBCUTANEOUS at 21:33

## 2019-06-09 NOTE — PROGRESS NOTE ADULT - ASSESSMENT
68 year old woman w/ PMH sig for metastatic GIST who presents with abdominal pain, with additional concern for possible pathologic fracture of R pubic ramus and possible metastasis to L3.    - MRI lumbosacral spine final read appreciated, will review with attending and discuss possible surgical timing pending review of MRI.  - Will f/u pathology results from EGD/EUS on 6/6.  - Will continue to follow.    Seen and examined with Dr. Cross.    D Team Surgery  [phone]

## 2019-06-09 NOTE — PROGRESS NOTE ADULT - SUBJECTIVE AND OBJECTIVE BOX
Surgery Progress Note    SUBJECTIVE: Pt seen and examined at bedside. Patient comfortable and in no-apparent distress. No nausea, vomiting, diarrhea. Pain is controlled. Tolerating diet.    Vital Signs Last 24 Hrs  T(C): 37 (09 Jun 2019 13:59), Max: 37 (09 Jun 2019 13:59)  T(F): 98.6 (09 Jun 2019 13:59), Max: 98.6 (09 Jun 2019 13:59)  HR: 65 (09 Jun 2019 13:59) (65 - 68)  BP: 118/60 (09 Jun 2019 13:59) (118/60 - 132/75)  BP(mean): --  RR: 17 (09 Jun 2019 13:59) (17 - 18)  SpO2: 100% (09 Jun 2019 13:59) (96% - 100%)    Physical Exam:  General Appearance: in NAD  Respiratory: No labored breathing  CV: Pulse regularly present  Abdomen: Soft, nontender, nondistended    LABS:                        11.9   4.72  )-----------( 181      ( 09 Jun 2019 06:41 )             37.3     06-09    136  |  102  |  11  ----------------------------<  118<H>  4.0   |  22  |  0.57    Ca    9.0      09 Jun 2019 06:41  Phos  3.0     06-09  Mg     2.1     06-09    TPro  6.4  /  Alb  3.9  /  TBili  0.3  /  DBili  x   /  AST  19  /  ALT  13  /  AlkPhos  47  06-09          INs and OUTs:

## 2019-06-09 NOTE — PROGRESS NOTE ADULT - PROBLEM SELECTOR PLAN 1
- pt presented with 5 days of left lower abdominal pain with no clinical signs of infection or obstruction, suggestive of exophytic mass seen on CT as likely cause  -Onc consult appreciated, rec to have medical treatment with her Oncologist  - Awaiting surgical oncology ,Dr Montejo to see pt.   - if no surgical intervention offered, likely home with her oncologist [place] [facility] [place] and ? clinical trial as per Onc consult rec.  - advance diet as tolerated  - recent (+)UA/Cx with GBS, patient asymptomatic, repeat UA pending

## 2019-06-09 NOTE — PROGRESS NOTE ADULT - SUBJECTIVE AND OBJECTIVE BOX
Patient is a 68y old  Female who presents with a chief complaint of abdominal pain (07 Jun 2019 06:16)      Patient seen and examined at the bedside feeling much better denies any fever, chills,    MRI does not show any acute lesion  Pain is much better              MEDICATIONS  (STANDING):  amLODIPine   Tablet 5 milliGRAM(s) Oral daily  artificial tears (preservative free) Ophthalmic Solution 1 Drop(s) Both EYES four times a day  dextrose 5% + sodium chloride 0.45%. 1000 milliLiter(s) (75 mL/Hr) IV Continuous <Continuous>  docusate sodium 100 milliGRAM(s) Oral three times a day  heparin  Injectable 5000 Unit(s) SubCutaneous every 8 hours  lisinopril 20 milliGRAM(s) Oral daily  polyethylene glycol 3350 17 Gram(s) Oral daily  senna 2 Tablet(s) Oral at bedtime  tenofovir disoproxil fumarate (VIREAD) 300 milliGRAM(s) Oral daily    MEDICATIONS  (PRN):  acetaminophen   Tablet .. 650 milliGRAM(s) Oral every 6 hours PRN Moderate Pain (4 - 6)  ALBUTerol    90 MICROgram(s) HFA Inhaler 2 Puff(s) Inhalation every 6 hours PRN Shortness of Breath and/or Wheezing  lidocaine   Patch 1 Patch Transdermal every 24 hours PRN Right knee pain  lidocaine   Patch 1 Patch Transdermal every 24 hours PRN Left knee pain  morphine  - Injectable 2 milliGRAM(s) IV Push every 4 hours PRN Moderate Pain (4 - 6)  morphine  - Injectable 4 milliGRAM(s) IV Push every 4 hours PRN Severe Pain (7 - 10)  ondansetron Injectable 4 milliGRAM(s) IV Push every 6 hours PRN Nausea and/or Vomiting    Vital Signs Last 24 Hrs  T(C): 36.9 (09 Jun 2019 06:37), Max: 36.9 (08 Jun 2019 15:03)  T(F): 98.4 (09 Jun 2019 06:37), Max: 98.4 (08 Jun 2019 15:03)  HR: 65 (09 Jun 2019 06:37) (61 - 68)  BP: 123/60 (09 Jun 2019 06:37) (123/60 - 132/75)  BP(mean): --  RR: 17 (09 Jun 2019 06:37) (17 - 18)  SpO2: 96% (09 Jun 2019 06:37) (96% - 100%)    PHYSICAL EXAM:  GENERAL: NAD, well-developed  HEAD:  Atraumatic, Normocephalic  EYES: EOMI, PERRLA, conjunctiva and sclera clear  NECK: Supple, No JVD  CHEST/LUNG: Clear to auscultation bilaterally; No wheeze  HEART: Regular rate and rhythm; No murmurs, rubs, or gallops  ABDOMEN: Soft, Nontender, Nondistended; Bowel sounds present  EXTREMITIES:  2+ Peripheral Pulses, No clubbing, cyanosis, or edema  PSYCH: AAOx3  NEUROLOGY: non-focal  SKIN: No rashes or lesions    LABS:                                                              11.9   4.72  )-----------( 181      ( 09 Jun 2019 06:41 )             37.3       06-09    136  |  102  |  11  ----------------------------<  118<H>  4.0   |  22  |  0.57    Ca    9.0      09 Jun 2019 06:41  Phos  3.0     06-09  Mg     2.1     06-09    TPro  6.4  /  Alb  3.9  /  TBili  0.3  /  DBili  x   /  AST  19  /  ALT  13  /  AlkPhos  47  06-09      MRI :    IMPRESSION:    1.  No aggressive osseous lesion.  2.  Nonaggressive chondroid lesion in the left greater trochanter.   Hemangioma within the right L3 vertebral body.  3.  Patchy T2 signal hyperintensity within the pelvis likely reflects red   marrow reconversion.  4.  Degenerative changes as above. Patient is a 68y old  Female who presents with a chief complaint of abdominal pain (07 Jun 2019 06:16)      Patient seen and examined at the bedside feeling much better denies any fever, chills,    MRI does not show any acute lesion  Pain is much better  Patient stated that surgery wants them to be here under less that he EUS result came back  So they can determine whether she needs a surgery or not.              MEDICATIONS  (STANDING):  amLODIPine   Tablet 5 milliGRAM(s) Oral daily  artificial tears (preservative free) Ophthalmic Solution 1 Drop(s) Both EYES four times a day  dextrose 5% + sodium chloride 0.45%. 1000 milliLiter(s) (75 mL/Hr) IV Continuous <Continuous>  docusate sodium 100 milliGRAM(s) Oral three times a day  heparin  Injectable 5000 Unit(s) SubCutaneous every 8 hours  lisinopril 20 milliGRAM(s) Oral daily  polyethylene glycol 3350 17 Gram(s) Oral daily  senna 2 Tablet(s) Oral at bedtime  tenofovir disoproxil fumarate (VIREAD) 300 milliGRAM(s) Oral daily    MEDICATIONS  (PRN):  acetaminophen   Tablet .. 650 milliGRAM(s) Oral every 6 hours PRN Moderate Pain (4 - 6)  ALBUTerol    90 MICROgram(s) HFA Inhaler 2 Puff(s) Inhalation every 6 hours PRN Shortness of Breath and/or Wheezing  lidocaine   Patch 1 Patch Transdermal every 24 hours PRN Right knee pain  lidocaine   Patch 1 Patch Transdermal every 24 hours PRN Left knee pain  morphine  - Injectable 2 milliGRAM(s) IV Push every 4 hours PRN Moderate Pain (4 - 6)  morphine  - Injectable 4 milliGRAM(s) IV Push every 4 hours PRN Severe Pain (7 - 10)  ondansetron Injectable 4 milliGRAM(s) IV Push every 6 hours PRN Nausea and/or Vomiting    Vital Signs Last 24 Hrs  T(C): 36.9 (09 Jun 2019 06:37), Max: 36.9 (08 Jun 2019 15:03)  T(F): 98.4 (09 Jun 2019 06:37), Max: 98.4 (08 Jun 2019 15:03)  HR: 65 (09 Jun 2019 06:37) (61 - 68)  BP: 123/60 (09 Jun 2019 06:37) (123/60 - 132/75)  BP(mean): --  RR: 17 (09 Jun 2019 06:37) (17 - 18)  SpO2: 96% (09 Jun 2019 06:37) (96% - 100%)    PHYSICAL EXAM:  GENERAL: NAD, well-developed  HEAD:  Atraumatic, Normocephalic  EYES: EOMI, PERRLA, conjunctiva and sclera clear  NECK: Supple, No JVD  CHEST/LUNG: Clear to auscultation bilaterally; No wheeze  HEART: Regular rate and rhythm; No murmurs, rubs, or gallops  ABDOMEN: Soft, Nontender, Nondistended; Bowel sounds present  EXTREMITIES:  2+ Peripheral Pulses, No clubbing, cyanosis, or edema  PSYCH: AAOx3  NEUROLOGY: non-focal  SKIN: No rashes or lesions    LABS:                                                              11.9   4.72  )-----------( 181      ( 09 Jun 2019 06:41 )             37.3       06-09    136  |  102  |  11  ----------------------------<  118<H>  4.0   |  22  |  0.57    Ca    9.0      09 Jun 2019 06:41  Phos  3.0     06-09  Mg     2.1     06-09    TPro  6.4  /  Alb  3.9  /  TBili  0.3  /  DBili  x   /  AST  19  /  ALT  13  /  AlkPhos  47  06-09      MRI :    IMPRESSION:    1.  No aggressive osseous lesion.  2.  Nonaggressive chondroid lesion in the left greater trochanter.   Hemangioma within the right L3 vertebral body.  3.  Patchy T2 signal hyperintensity within the pelvis likely reflects red   marrow reconversion.  4.  Degenerative changes as above.

## 2019-06-10 LAB
HIV COMBO RESULT: SIGNIFICANT CHANGE UP
HIV1+2 AB SPEC QL: SIGNIFICANT CHANGE UP

## 2019-06-10 PROCEDURE — 99232 SBSQ HOSP IP/OBS MODERATE 35: CPT

## 2019-06-10 RX ADMIN — LIDOCAINE 1 PATCH: 4 CREAM TOPICAL at 23:04

## 2019-06-10 RX ADMIN — Medication 1 DROP(S): at 06:33

## 2019-06-10 RX ADMIN — LIDOCAINE 1 PATCH: 4 CREAM TOPICAL at 06:25

## 2019-06-10 RX ADMIN — LISINOPRIL 20 MILLIGRAM(S): 2.5 TABLET ORAL at 06:33

## 2019-06-10 RX ADMIN — Medication 1 DROP(S): at 13:42

## 2019-06-10 RX ADMIN — AMLODIPINE BESYLATE 5 MILLIGRAM(S): 2.5 TABLET ORAL at 06:33

## 2019-06-10 RX ADMIN — TENOFOVIR DISOPROXIL FUMARATE 300 MILLIGRAM(S): 300 TABLET, FILM COATED ORAL at 13:42

## 2019-06-10 RX ADMIN — LIDOCAINE 1 PATCH: 4 CREAM TOPICAL at 09:16

## 2019-06-10 RX ADMIN — Medication 1 DROP(S): at 23:05

## 2019-06-10 NOTE — PROGRESS NOTE ADULT - SUBJECTIVE AND OBJECTIVE BOX
Chief Complaint: Patient is a 68y old  Female who presents with a chief complaint of abdominal pain (10 Irving 2019 06:30)    INTERVAL HPI/OVERNIGHT EVENTS:   Patient without acute issues overnight, denies abdominal pain, fevers/chills, nausea/vomiting.      MEDICATIONS  (STANDING):  amLODIPine   Tablet 5 milliGRAM(s) Oral daily  artificial tears (preservative free) Ophthalmic Solution 1 Drop(s) Both EYES four times a day  dextrose 5% + sodium chloride 0.45%. 1000 milliLiter(s) (75 mL/Hr) IV Continuous <Continuous>  docusate sodium 100 milliGRAM(s) Oral three times a day  heparin  Injectable 5000 Unit(s) SubCutaneous every 8 hours  lisinopril 20 milliGRAM(s) Oral daily  polyethylene glycol 3350 17 Gram(s) Oral daily  senna 2 Tablet(s) Oral at bedtime  tenofovir disoproxil fumarate (VIREAD) 300 milliGRAM(s) Oral daily    MEDICATIONS  (PRN):  acetaminophen   Tablet .. 650 milliGRAM(s) Oral every 6 hours PRN Moderate Pain (4 - 6)  ALBUTerol    90 MICROgram(s) HFA Inhaler 2 Puff(s) Inhalation every 6 hours PRN Shortness of Breath and/or Wheezing  lidocaine   Patch 1 Patch Transdermal every 24 hours PRN Right knee pain  lidocaine   Patch 1 Patch Transdermal every 24 hours PRN Left knee pain  morphine  - Injectable 2 milliGRAM(s) IV Push every 4 hours PRN Moderate Pain (4 - 6)  morphine  - Injectable 4 milliGRAM(s) IV Push every 4 hours PRN Severe Pain (7 - 10)  ondansetron Injectable 4 milliGRAM(s) IV Push every 6 hours PRN Nausea and/or Vomiting      Vital Signs Last 24 Hrs  T(C): 36.8 (10 Irving 2019 14:42), Max: 37.1 (09 Jun 2019 21:07)  T(F): 98.3 (10 Irving 2019 14:42), Max: 98.7 (09 Jun 2019 21:07)  HR: 63 (10 Irving 2019 14:42) (63 - 73)  BP: 120/60 (10 Irving 2019 14:42) (120/60 - 156/74)  BP(mean): --  RR: 16 (10 Irving 2019 14:42) (16 - 18)  SpO2: 100% (10 Irving 2019 14:42) (99% - 100%)    I&O's Detail  CAPILLARY BLOOD GLUCOSE    PHYSICAL EXAM:    GENERAL: NAD  Pulm: CTA b/l  CV: S1&S2+, rrr  ABDOMEN: bs+, soft, nt, nd,   EXTREMITIES:  2+ peripheral pulses, no appreciable edema in b/l LE  Neuro: Awake, alert and oriented x 3     LABS:                        11.9   4.72  )-----------( 181      ( 09 Jun 2019 06:41 )             37.3     06-09    136  |  102  |  11  ----------------------------<  118<H>  4.0   |  22  |  0.57    Ca    9.0      09 Jun 2019 06:41  Phos  3.0     06-09  Mg     2.1     06-09    TPro  6.4  /  Alb  3.9  /  TBili  0.3  /  DBili  x   /  AST  19  /  ALT  13  /  AlkPhos  47  06-09    LIVER FUNCTIONS - ( 09 Jun 2019 06:41 )  Alb: 3.9 g/dL / Pro: 6.4 g/dL / ALK PHOS: 47 u/L / ALT: 13 u/L / AST: 19 u/L / GGT: x     / T. Bili 0.3 mg/dL / D. Bili x         Microbiology:    RADIOLOGY & ADDITIONAL TESTS:    Imaging Personally Reviewed:     Consultant(s) Notes Reviewed:    Surgery resident Dr. Zaldivar - patient likely a candidate for surgery given MRI bone negative, however awaiting final pathology from EGD prior to surgery    Care Discussed with Consultants/Other Providers

## 2019-06-10 NOTE — PROGRESS NOTE ADULT - SUBJECTIVE AND OBJECTIVE BOX
Surgery D Team Daily Progress Note    SUBJECTIVE:   No acute events overnight, denies N/V/D.     OBJECTIVE:   Vital Signs Last 24 Hrs  T(C): 37.1 (09 Jun 2019 21:07), Max: 37.1 (09 Jun 2019 21:07)  T(F): 98.7 (09 Jun 2019 21:07), Max: 98.7 (09 Jun 2019 21:07)  HR: 73 (09 Jun 2019 21:07) (65 - 73)  BP: 126/59 (09 Jun 2019 21:07) (118/60 - 126/59)  BP(mean): --  RR: 17 (09 Jun 2019 21:07) (17 - 17)  SpO2: 99% (09 Jun 2019 21:07) (96% - 100%)    PHYSICAL EXAM:  General Appearance: in NAD  Respiratory: No labored breathing  CV: Pulse regularly present  Abdomen: Soft, nontender, nondistended    CBC (06-09 @ 06:41)                          11.9                     4.72    )--------------(  181        62.4  % Neuts, 23.7  % Lymphs, ANC: 2.94                            37.3      BMP (06-09 @ 06:41)       136     |  102     |  11    			Ca++ --      Ca 9.0          ---------------------------------( 118<H>		Mg 2.1          4.0     |  22      |  0.57  			Ph 3.0       LFTs (06-09 @ 06:41)      TPro 6.4 / Alb 3.9 / TBili 0.3 / DBili -- / AST 19 / ALT 13 / AlkPhos 47    RADIOLOGY & ADDITIONAL STUDIES: no new studies performed

## 2019-06-10 NOTE — PROGRESS NOTE ADULT - ASSESSMENT
68 year old female with hx of GIST tumor (s/p resection in 2017, intermittently on gleevec -->now on tasigna), HTN, RA who presents with abdominal pain x 5 days with seen exophytic mass in setting of metastatic GIST   Surgery Dr Montejo following  Solid mass in the gastrohepatic space demonstrates an interval increase   since prior study of 8/16/2018 on CT    -  GI completed EUS for tissue biopsy on 6/6/19, pathology pending  - Patient may be a candidate for gastric resection w/ liver resection vs. ablation    C-spine xray- 6/5 Done    MRI of L spine 6/6  Impression: Degenerative changes, no aggressive osseous lesions

## 2019-06-10 NOTE — PROGRESS NOTE ADULT - ASSESSMENT
68 year old woman w/ PMH sig for metastatic GIST who presents with abdominal pain, with additional concern for possible pathologic fracture of R pubic ramus and possible metastasis to L3.    - MRI lumbosacral spine final read reviewed  - Will f/u pathology results from EGD/EUS on 6/6.  - Surgical planning pending above  - Will continue to follow    Mary Zaldivar PGY-2  Surgery Pager [phone]

## 2019-06-10 NOTE — CHART NOTE - NSCHARTNOTEFT_GEN_A_CORE
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

## 2019-06-10 NOTE — PROGRESS NOTE ADULT - PROBLEM SELECTOR PLAN 1
- pt presented with 5 days of left lower abdominal pain with no clinical signs of infection or obstruction, suggestive of exophytic mass seen on CT as likely cause  -Onc consult appreciated, rec to have medical treatment with her Oncologist  - Surgical oncology consult appreciated, patient is likely a surgical candidate, surgery is pending pathology read from EGD with biopsies on 6/6/19   - if no surgical intervention offered, likely home with her oncologist f/u and [facility] [place] and ? clinical trial as per Onc consult rec.  - advance diet as tolerated

## 2019-06-11 DIAGNOSIS — E87.6 HYPOKALEMIA: ICD-10-CM

## 2019-06-11 LAB
ANION GAP SERPL CALC-SCNC: 14 MMO/L — SIGNIFICANT CHANGE UP (ref 7–14)
BASOPHILS # BLD AUTO: 0.02 K/UL — SIGNIFICANT CHANGE UP (ref 0–0.2)
BASOPHILS NFR BLD AUTO: 0.4 % — SIGNIFICANT CHANGE UP (ref 0–2)
BUN SERPL-MCNC: 12 MG/DL — SIGNIFICANT CHANGE UP (ref 7–23)
CALCIUM SERPL-MCNC: 8.5 MG/DL — SIGNIFICANT CHANGE UP (ref 8.4–10.5)
CHLORIDE SERPL-SCNC: 101 MMOL/L — SIGNIFICANT CHANGE UP (ref 98–107)
CO2 SERPL-SCNC: 21 MMOL/L — LOW (ref 22–31)
CREAT SERPL-MCNC: 0.55 MG/DL — SIGNIFICANT CHANGE UP (ref 0.5–1.3)
EOSINOPHIL # BLD AUTO: 0.15 K/UL — SIGNIFICANT CHANGE UP (ref 0–0.5)
EOSINOPHIL NFR BLD AUTO: 3.3 % — SIGNIFICANT CHANGE UP (ref 0–6)
GLUCOSE SERPL-MCNC: 110 MG/DL — HIGH (ref 70–99)
HBV CORE AB SER-ACNC: REACTIVE — SIGNIFICANT CHANGE UP
HBV SURFACE AB SER-ACNC: REACTIVE — SIGNIFICANT CHANGE UP
HBV SURFACE AG SER-ACNC: NONREACTIVE — SIGNIFICANT CHANGE UP
HCT VFR BLD CALC: 35.4 % — SIGNIFICANT CHANGE UP (ref 34.5–45)
HCV AB S/CO SERPL IA: 0.08 S/CO — SIGNIFICANT CHANGE UP (ref 0–0.99)
HCV AB SERPL-IMP: SIGNIFICANT CHANGE UP
HCV RNA SERPL NAA DL=5-ACNC: NOT DETECTED IU/ML — SIGNIFICANT CHANGE UP
HCV RNA SPEC NAA+PROBE-LOG IU: SIGNIFICANT CHANGE UP LOGIU/ML
HGB BLD-MCNC: 11.2 G/DL — LOW (ref 11.5–15.5)
IMM GRANULOCYTES NFR BLD AUTO: 0.2 % — SIGNIFICANT CHANGE UP (ref 0–1.5)
LYMPHOCYTES # BLD AUTO: 1.29 K/UL — SIGNIFICANT CHANGE UP (ref 1–3.3)
LYMPHOCYTES # BLD AUTO: 28.5 % — SIGNIFICANT CHANGE UP (ref 13–44)
MAGNESIUM SERPL-MCNC: 2 MG/DL — SIGNIFICANT CHANGE UP (ref 1.6–2.6)
MCHC RBC-ENTMCNC: 25.7 PG — LOW (ref 27–34)
MCHC RBC-ENTMCNC: 31.6 % — LOW (ref 32–36)
MCV RBC AUTO: 81.4 FL — SIGNIFICANT CHANGE UP (ref 80–100)
MONOCYTES # BLD AUTO: 0.48 K/UL — SIGNIFICANT CHANGE UP (ref 0–0.9)
MONOCYTES NFR BLD AUTO: 10.6 % — SIGNIFICANT CHANGE UP (ref 2–14)
NEUTROPHILS # BLD AUTO: 2.57 K/UL — SIGNIFICANT CHANGE UP (ref 1.8–7.4)
NEUTROPHILS NFR BLD AUTO: 57 % — SIGNIFICANT CHANGE UP (ref 43–77)
NRBC # FLD: 0 K/UL — SIGNIFICANT CHANGE UP (ref 0–0)
PHOSPHATE SERPL-MCNC: 3 MG/DL — SIGNIFICANT CHANGE UP (ref 2.5–4.5)
PLATELET # BLD AUTO: 194 K/UL — SIGNIFICANT CHANGE UP (ref 150–400)
PMV BLD: 10.6 FL — SIGNIFICANT CHANGE UP (ref 7–13)
POTASSIUM SERPL-MCNC: 4.2 MMOL/L — SIGNIFICANT CHANGE UP (ref 3.5–5.3)
POTASSIUM SERPL-SCNC: 4.2 MMOL/L — SIGNIFICANT CHANGE UP (ref 3.5–5.3)
RBC # BLD: 4.35 M/UL — SIGNIFICANT CHANGE UP (ref 3.8–5.2)
RBC # FLD: 14.1 % — SIGNIFICANT CHANGE UP (ref 10.3–14.5)
SODIUM SERPL-SCNC: 136 MMOL/L — SIGNIFICANT CHANGE UP (ref 135–145)
WBC # BLD: 4.52 K/UL — SIGNIFICANT CHANGE UP (ref 3.8–10.5)
WBC # FLD AUTO: 4.52 K/UL — SIGNIFICANT CHANGE UP (ref 3.8–10.5)

## 2019-06-11 PROCEDURE — 99232 SBSQ HOSP IP/OBS MODERATE 35: CPT

## 2019-06-11 RX ADMIN — POLYETHYLENE GLYCOL 3350 17 GRAM(S): 17 POWDER, FOR SOLUTION ORAL at 13:27

## 2019-06-11 RX ADMIN — TENOFOVIR DISOPROXIL FUMARATE 300 MILLIGRAM(S): 300 TABLET, FILM COATED ORAL at 13:27

## 2019-06-11 RX ADMIN — HEPARIN SODIUM 5000 UNIT(S): 5000 INJECTION INTRAVENOUS; SUBCUTANEOUS at 13:28

## 2019-06-11 RX ADMIN — Medication 1 DROP(S): at 17:58

## 2019-06-11 RX ADMIN — Medication 100 MILLIGRAM(S): at 13:27

## 2019-06-11 RX ADMIN — Medication 1 DROP(S): at 05:18

## 2019-06-11 RX ADMIN — LIDOCAINE 1 PATCH: 4 CREAM TOPICAL at 13:28

## 2019-06-11 RX ADMIN — LIDOCAINE 1 PATCH: 4 CREAM TOPICAL at 07:16

## 2019-06-11 RX ADMIN — AMLODIPINE BESYLATE 5 MILLIGRAM(S): 2.5 TABLET ORAL at 05:18

## 2019-06-11 RX ADMIN — LISINOPRIL 20 MILLIGRAM(S): 2.5 TABLET ORAL at 05:18

## 2019-06-11 RX ADMIN — Medication 1 DROP(S): at 13:27

## 2019-06-11 NOTE — PROGRESS NOTE ADULT - SUBJECTIVE AND OBJECTIVE BOX
Chief Complaint: Patient is a 68y old  Female who presents with a chief complaint of abdominal pain (11 Jun 2019 05:18)    INTERVAL HPI/OVERNIGHT EVENTS:   Without acute events overnight.    MEDICATIONS  (STANDING):  amLODIPine   Tablet 5 milliGRAM(s) Oral daily  artificial tears (preservative free) Ophthalmic Solution 1 Drop(s) Both EYES four times a day  dextrose 5% + sodium chloride 0.45%. 1000 milliLiter(s) (75 mL/Hr) IV Continuous <Continuous>  docusate sodium 100 milliGRAM(s) Oral three times a day  heparin  Injectable 5000 Unit(s) SubCutaneous every 8 hours  lisinopril 20 milliGRAM(s) Oral daily  polyethylene glycol 3350 17 Gram(s) Oral daily  senna 2 Tablet(s) Oral at bedtime  tenofovir disoproxil fumarate (VIREAD) 300 milliGRAM(s) Oral daily    MEDICATIONS  (PRN):  acetaminophen   Tablet .. 650 milliGRAM(s) Oral every 6 hours PRN Moderate Pain (4 - 6)  ALBUTerol    90 MICROgram(s) HFA Inhaler 2 Puff(s) Inhalation every 6 hours PRN Shortness of Breath and/or Wheezing  lidocaine   Patch 1 Patch Transdermal every 24 hours PRN Right knee pain  lidocaine   Patch 1 Patch Transdermal every 24 hours PRN Left knee pain  morphine  - Injectable 2 milliGRAM(s) IV Push every 4 hours PRN Moderate Pain (4 - 6)  morphine  - Injectable 4 milliGRAM(s) IV Push every 4 hours PRN Severe Pain (7 - 10)  ondansetron Injectable 4 milliGRAM(s) IV Push every 6 hours PRN Nausea and/or Vomiting      Vital Signs Last 24 Hrs  T(C): 36.7 (11 Jun 2019 05:17), Max: 36.8 (10 Irving 2019 14:42)  T(F): 98.1 (11 Jun 2019 05:17), Max: 98.3 (10 Irving 2019 14:42)  HR: 76 (11 Jun 2019 05:17) (63 - 76)  BP: 154/80 (11 Jun 2019 05:17) (120/60 - 154/80)  BP(mean): --  RR: 16 (11 Jun 2019 05:17) (16 - 18)  SpO2: 100% (11 Jun 2019 05:17) (100% - 100%)    PHYSICAL EXAM  GEN:  CV:  CHEST:  ABD:  EXT:    LABS:                        11.2   4.52  )-----------( 194      ( 11 Jun 2019 07:25 )             35.4     06-11    136  |  101  |  12  ----------------------------<  110<H>  4.2   |  21<L>  |  0.55    Ca    8.5      11 Jun 2019 07:25  Phos  3.0     06-11  Mg     2.0     06-11    LIVER FUNCTIONS - ( 09 Jun 2019 06:41 )  Alb: 3.9 g/dL / Pro: 6.4 g/dL / ALK PHOS: 47 u/L / ALT: 13 u/L / AST: 19 u/L / GGT: x     / T. Bili 0.3 mg/dL / D. Bili x         Microbiology:    RADIOLOGY & ADDITIONAL TESTS:    Imaging Personally Reviewed:     Consultant(s) Notes Reviewed:    Surgery Resident Dr. Zaldivar - plans are pending results of EGD biopsy     Care Discussed with Consultants/Other Providers Chief Complaint: Patient is a 68y old  Female who presents with a chief complaint of abdominal pain (11 Jun 2019 05:18)    INTERVAL HPI/OVERNIGHT EVENTS:   Without acute events overnight.    MEDICATIONS  (STANDING):  amLODIPine   Tablet 5 milliGRAM(s) Oral daily  artificial tears (preservative free) Ophthalmic Solution 1 Drop(s) Both EYES four times a day  dextrose 5% + sodium chloride 0.45%. 1000 milliLiter(s) (75 mL/Hr) IV Continuous <Continuous>  docusate sodium 100 milliGRAM(s) Oral three times a day  heparin  Injectable 5000 Unit(s) SubCutaneous every 8 hours  lisinopril 20 milliGRAM(s) Oral daily  polyethylene glycol 3350 17 Gram(s) Oral daily  senna 2 Tablet(s) Oral at bedtime  tenofovir disoproxil fumarate (VIREAD) 300 milliGRAM(s) Oral daily    MEDICATIONS  (PRN):  acetaminophen   Tablet .. 650 milliGRAM(s) Oral every 6 hours PRN Moderate Pain (4 - 6)  ALBUTerol    90 MICROgram(s) HFA Inhaler 2 Puff(s) Inhalation every 6 hours PRN Shortness of Breath and/or Wheezing  lidocaine   Patch 1 Patch Transdermal every 24 hours PRN Right knee pain  lidocaine   Patch 1 Patch Transdermal every 24 hours PRN Left knee pain  morphine  - Injectable 2 milliGRAM(s) IV Push every 4 hours PRN Moderate Pain (4 - 6)  morphine  - Injectable 4 milliGRAM(s) IV Push every 4 hours PRN Severe Pain (7 - 10)  ondansetron Injectable 4 milliGRAM(s) IV Push every 6 hours PRN Nausea and/or Vomiting      Vital Signs Last 24 Hrs  T(C): 36.7 (11 Jun 2019 05:17), Max: 36.8 (10 Irving 2019 14:42)  T(F): 98.1 (11 Jun 2019 05:17), Max: 98.3 (10 Irving 2019 14:42)  HR: 76 (11 Jun 2019 05:17) (63 - 76)  BP: 154/80 (11 Jun 2019 05:17) (120/60 - 154/80)  BP(mean): --  RR: 16 (11 Jun 2019 05:17) (16 - 18)  SpO2: 100% (11 Jun 2019 05:17) (100% - 100%)    PHYSICAL EXAM  GEN: NAD  CV: +S1S2 RRR without R/M/G  CHEST: CTA b/l without R/W/R  ABD: soft ND/NT, BS+  EXT: without C/C/E    LABS:                        11.2   4.52  )-----------( 194      ( 11 Jun 2019 07:25 )             35.4     06-11    136  |  101  |  12  ----------------------------<  110<H>  4.2   |  21<L>  |  0.55    Ca    8.5      11 Jun 2019 07:25  Phos  3.0     06-11  Mg     2.0     06-11    LIVER FUNCTIONS - ( 09 Jun 2019 06:41 )  Alb: 3.9 g/dL / Pro: 6.4 g/dL / ALK PHOS: 47 u/L / ALT: 13 u/L / AST: 19 u/L / GGT: x     / T. Bili 0.3 mg/dL / D. Bili x         Microbiology:    RADIOLOGY & ADDITIONAL TESTS:    Imaging Personally Reviewed:     Consultant(s) Notes Reviewed:    Surgery Resident Dr. Zaldivra - plans are pending results of EGD biopsy     Care Discussed with Consultants/Other Providers   Dr. Montejo Surgical Oncology Attending - plan for resection as outpatient

## 2019-06-11 NOTE — PROGRESS NOTE ADULT - ASSESSMENT
68F with hx of GIST tumor (s/p resection in 2017, intermittently on gleevec -->now on tasigna), HTN, RA who presents with abdominal pain x 5 days with seen exophytic mass in setting of metastatic GIST   Surgery Dr Montejo following  Solid mass in the gastrohepatic space demonstrates an interval increase   since prior study of 8/16/2018 on CT    -  GI completed EUS for tissue biopsy on 6/6/19, pathology pending  - Patient may be a candidate for gastric resection w/ liver resection vs. ablation    C-spine xray- 6/5 Done    MRI of L spine 6/6  Impression: Degenerative changes, no aggressive osseous lesions

## 2019-06-11 NOTE — PROGRESS NOTE ADULT - SUBJECTIVE AND OBJECTIVE BOX
Surgery D Team Daily Progress Note    SUBJECTIVE:   No acute events overnight, denies N/V/D.     OBJECTIVE:   Vital Signs Last 24 Hrs  T(C): 36.7 (11 Jun 2019 05:17), Max: 37.1 (10 Irving 2019 06:33)  T(F): 98.1 (11 Jun 2019 05:17), Max: 98.7 (10 Irving 2019 06:33)  HR: 76 (11 Jun 2019 05:17) (63 - 76)  BP: 154/80 (11 Jun 2019 05:17) (120/60 - 156/74)  BP(mean): --  RR: 16 (11 Jun 2019 05:17) (16 - 18)  SpO2: 100% (11 Jun 2019 05:17) (100% - 100%)    PHYSICAL EXAM:  General Appearance: in NAD  Respiratory: No labored breathing  CV: Pulse regularly present  Abdomen: Soft, nontender, nondistended  Extr: wwp    < from: MR Lumbar Spine w/ IV Cont (06.06.19 @ 20:32) >  Impression: Degenerative changes as described above.    LINDA COOL M.D., ATTENDING RADIOLOGIST  This document has been electronically signed. Jun 7 2019  9:15AM    < end of copied text >    < from: MR Pelvis w/ IV Cont (06.06.19 @ 20:32) >  IMPRESSION:    1.  No aggressive osseous lesion.  2.  Nonaggressive chondroid lesion in the left greater trochanter.   Hemangioma within the right L3 vertebral body.  3.  Patchy T2 signal hyperintensity within the pelvis likely reflects red   marrow reconversion.  4.  Degenerative changes as above.    KHALIDA WATTERS M.D., ATTENDING RADIOLOGIST  This document has been electronically signed. Jun 8 2019 10:50AM    < end of copied text >

## 2019-06-11 NOTE — PROGRESS NOTE ADULT - PROBLEM SELECTOR PLAN 2
- pt presented with 5 days of left lower abdominal pain with no clinical signs of infection or obstruction, suggestive of exophytic mass seen on CT as likely cause  -Onc consult appreciated, rec to have medical treatment with her Oncologist  - Surgical oncology consult appreciated, patient is likely a surgical candidate, surgery is pending pathology read from EGD with biopsies on 6/6/19   - if no surgical intervention offered, likely home with her oncologist f/u and [facility] for 2nd opinion and ? clinical trial as per Onc consult rec.  - advance diet as tolerated

## 2019-06-11 NOTE — PROGRESS NOTE ADULT - ASSESSMENT
68 year old woman w/ PMH sig for metastatic GIST who presents with abdominal pain, with additional concern for possible pathologic fracture of R pubic ramus and possible metastasis to L3.    - MRI lumbosacral spine final read reviewed  - Will f/u pathology results from EGD/EUS on 6/6.  - Surgical planning pending  - Will continue to follow    Mary Zaldivar PGY-2  Surgery Pager [phone]

## 2019-06-11 NOTE — PROGRESS NOTE ADULT - PROBLEM SELECTOR PLAN 1
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   - previously hepatitis serologies with both hep B core and surface antibodies positive, will continue with viread as per home regimen given that patient is on TKI GIST metastatic to liver  - hx of GIST diagnosed in 2017 s/p resection and previously on Gleevec which was discontinued 2/2 edema, now on tasigna with CT scans indicative of worsening metastatic disease   - Onc Consult input appreciated  - Surg Onc consult as above for evaluation, given the MRI bone does not show aggressive lesion will plan for surgery pending the pathology results from prior biopsy. Pathology now available and reveals GIST, discussed with Dr. Montejo and patient will be planned for resection as outpatient, Dr. Montejo to discuss with patient tonight and patient is planned for discharge in AM.   - previously hepatitis serologies with both hep B core and surface antibodies positive, will continue with viread as per home regimen given that patient is on TKI

## 2019-06-11 NOTE — PROGRESS NOTE ADULT - ATTENDING COMMENTS
D/w pt MRi w/o evidence of bony dz -- will reschedule surgery through SDA.  OK for DC from Surgical standpoint w return for surgery approx 1 - 2 weeks

## 2019-06-12 ENCOUNTER — TRANSCRIPTION ENCOUNTER (OUTPATIENT)
Age: 69
End: 2019-06-12

## 2019-06-12 ENCOUNTER — CHART COPY (OUTPATIENT)
Age: 69
End: 2019-06-12

## 2019-06-12 VITALS
HEART RATE: 69 BPM | RESPIRATION RATE: 16 BRPM | TEMPERATURE: 98 F | DIASTOLIC BLOOD PRESSURE: 60 MMHG | SYSTOLIC BLOOD PRESSURE: 118 MMHG | OXYGEN SATURATION: 100 %

## 2019-06-12 PROCEDURE — 99232 SBSQ HOSP IP/OBS MODERATE 35: CPT | Mod: GC

## 2019-06-12 PROCEDURE — 99239 HOSP IP/OBS DSCHRG MGMT >30: CPT

## 2019-06-12 RX ORDER — SENNA PLUS 8.6 MG/1
2 TABLET ORAL
Qty: 0 | Refills: 0 | DISCHARGE
Start: 2019-06-12

## 2019-06-12 RX ORDER — LIDOCAINE 4 G/100G
1 CREAM TOPICAL
Qty: 1 | Refills: 0
Start: 2019-06-12 | End: 2019-07-11

## 2019-06-12 RX ORDER — OXYCODONE HYDROCHLORIDE 5 MG/1
1 TABLET ORAL
Qty: 0 | Refills: 0 | DISCHARGE

## 2019-06-12 RX ORDER — LIDOCAINE 4 G/100G
1 CREAM TOPICAL
Qty: 30 | Refills: 0
Start: 2019-06-12 | End: 2019-07-11

## 2019-06-12 RX ORDER — POLYETHYLENE GLYCOL 3350 17 G/17G
17 POWDER, FOR SOLUTION ORAL
Qty: 0 | Refills: 0 | DISCHARGE
Start: 2019-06-12

## 2019-06-12 RX ORDER — DOCUSATE SODIUM 100 MG
1 CAPSULE ORAL
Qty: 0 | Refills: 0 | DISCHARGE
Start: 2019-06-12

## 2019-06-12 RX ORDER — LIDOCAINE 4 G/100G
1 CREAM TOPICAL
Qty: 0 | Refills: 0 | DISCHARGE

## 2019-06-12 RX ORDER — NILOTINIB 50 MG/1
0 CAPSULE ORAL
Qty: 0 | Refills: 0 | DISCHARGE

## 2019-06-12 RX ADMIN — LIDOCAINE 1 PATCH: 4 CREAM TOPICAL at 04:37

## 2019-06-12 RX ADMIN — LISINOPRIL 20 MILLIGRAM(S): 2.5 TABLET ORAL at 05:47

## 2019-06-12 RX ADMIN — AMLODIPINE BESYLATE 5 MILLIGRAM(S): 2.5 TABLET ORAL at 05:47

## 2019-06-12 RX ADMIN — Medication 1 DROP(S): at 05:47

## 2019-06-12 RX ADMIN — Medication 1 DROP(S): at 12:32

## 2019-06-12 RX ADMIN — TENOFOVIR DISOPROXIL FUMARATE 300 MILLIGRAM(S): 300 TABLET, FILM COATED ORAL at 12:31

## 2019-06-12 NOTE — DISCHARGE NOTE NURSING/CASE MANAGEMENT/SOCIAL WORK - NSDCDPATPORTLINK_GEN_ALL_CORE
You can access the [facility] Patient Portal, offered by [facility], by registering with the following website: [email or web address]

## 2019-06-12 NOTE — PROGRESS NOTE ADULT - PROBLEM SELECTOR PROBLEM 7
Preventive measure

## 2019-06-12 NOTE — PROGRESS NOTE ADULT - PROBLEM SELECTOR PROBLEM 1
Abdominal pain
GIST, malignant
GIST, malignant
Abdominal pain

## 2019-06-12 NOTE — PROGRESS NOTE ADULT - SUBJECTIVE AND OBJECTIVE BOX
Surgery D Team Daily Progress Note    SUBJECTIVE:   No acute events overnight.    OBJECTIVE:   Vital Signs Last 24 Hrs  T(C): 36.7 (12 Jun 2019 05:42), Max: 36.7 (11 Jun 2019 15:23)  T(F): 98 (12 Jun 2019 05:42), Max: 98.1 (11 Jun 2019 15:23)  HR: 67 (12 Jun 2019 05:42) (67 - 72)  BP: 139/71 (12 Jun 2019 05:42) (137/62 - 155/82)  BP(mean): --  RR: 18 (12 Jun 2019 05:42) (16 - 18)  SpO2: 100% (12 Jun 2019 05:42) (98% - 100%)    PHYSICAL EXAM:  General Appearance: in NAD  Respiratory: No labored breathing  CV: Pulse regularly present  Abdomen: Soft, nontender, nondistended  Extr: wwp    CBC (06-11 @ 07:25)                          11.2<L>                   4.52    )--------------(  194        57.0  % Neuts, 28.5  % Lymphs, ANC: 2.57                            35.4      BMP (06-11 @ 07:25)       136     |  101     |  12    			Ca++ --      Ca 8.5          ---------------------------------( 110<H>		Mg 2.0          4.2     |  21<L>   |  0.55  			Ph 3.0

## 2019-06-12 NOTE — PROGRESS NOTE ADULT - ASSESSMENT
Impression:  68 year old woman w/ PMH sig for metastatic GIST who presents with likely recurrent GIST.    #Recurrent GIST, s/p EGD/EUS with biopsy, path c/w GIST    Recs:  - path c/w GIST from biopsy of mass  - further care per surgery/oncology    Please call GI back with any questions.

## 2019-06-12 NOTE — PROGRESS NOTE ADULT - PROBLEM SELECTOR PLAN 1
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 Pathology now available and reveals GIST, discussed with Dr. Montejo and patient will be planned for resection as outpatient, Dr. Montejo to discuss with patient tonight and patient is planned for discharge in AM.   - previously hepatitis serologies with both hep B core and surface antibodies positive, will continue with viread as per home regimen given that patient is on TKI

## 2019-06-12 NOTE — PROGRESS NOTE ADULT - PROBLEM SELECTOR PLAN 2
- pt presented with 5 days of left lower abdominal pain with no clinical signs of infection or obstruction, suggestive of exophytic mass seen on CT as likely cause  -Onc consult appreciated, rec to have medical treatment with her Oncologist  - Surgical oncology consult appreciated, patient is likely a surgical candidate, surgery is pending pathology read from EGD with biopsies on 6/6/19   - if no surgical intervention offered, likely home with her oncologist f/u and [facility] for 2nd opinion and ? clinical trial as per Onc consult rec.  - advance diet as tolerated

## 2019-06-12 NOTE — PROGRESS NOTE ADULT - REASON FOR ADMISSION
abdominal pain

## 2019-06-12 NOTE — PROGRESS NOTE ADULT - SUBJECTIVE AND OBJECTIVE BOX
********************INCOMPLETE********************    Chief Complaint: Patient is a 68y old  Female who presents with a chief complaint of abdominal pain (12 Jun 2019 07:55)      INTERVAL HPI/OVERNIGHT EVENTS:   WIthout acute events overnight. Pathology has returned positive for GIST, patient was evaluated by Surg Onc and GI this morning.      MEDICATIONS  (STANDING):  amLODIPine   Tablet 5 milliGRAM(s) Oral daily  artificial tears (preservative free) Ophthalmic Solution 1 Drop(s) Both EYES four times a day  dextrose 5% + sodium chloride 0.45%. 1000 milliLiter(s) (75 mL/Hr) IV Continuous <Continuous>  docusate sodium 100 milliGRAM(s) Oral three times a day  heparin  Injectable 5000 Unit(s) SubCutaneous every 8 hours  lisinopril 20 milliGRAM(s) Oral daily  polyethylene glycol 3350 17 Gram(s) Oral daily  senna 2 Tablet(s) Oral at bedtime  tenofovir disoproxil fumarate (VIREAD) 300 milliGRAM(s) Oral daily    MEDICATIONS  (PRN):  acetaminophen   Tablet .. 650 milliGRAM(s) Oral every 6 hours PRN Moderate Pain (4 - 6)  ALBUTerol    90 MICROgram(s) HFA Inhaler 2 Puff(s) Inhalation every 6 hours PRN Shortness of Breath and/or Wheezing  lidocaine   Patch 1 Patch Transdermal every 24 hours PRN Right knee pain  lidocaine   Patch 1 Patch Transdermal every 24 hours PRN Left knee pain  morphine  - Injectable 2 milliGRAM(s) IV Push every 4 hours PRN Moderate Pain (4 - 6)  morphine  - Injectable 4 milliGRAM(s) IV Push every 4 hours PRN Severe Pain (7 - 10)  ondansetron Injectable 4 milliGRAM(s) IV Push every 6 hours PRN Nausea and/or Vomiting      Vital Signs Last 24 Hrs  T(C): 36.7 (12 Jun 2019 05:42), Max: 36.7 (11 Jun 2019 15:23)  T(F): 98 (12 Jun 2019 05:42), Max: 98.1 (11 Jun 2019 15:23)  HR: 67 (12 Jun 2019 05:42) (67 - 72)  BP: 139/71 (12 Jun 2019 05:42) (137/62 - 155/82)  BP(mean): --  RR: 18 (12 Jun 2019 05:42) (16 - 18)  SpO2: 100% (12 Jun 2019 05:42) (98% - 100%)    PHYSICAL EXAM:    GENERAL: NAD  Pulm: CTA b/l  CV: S1&S2+, rrr  ABDOMEN: bs+, soft, nt, nd,   EXTREMITIES:  2+ peripheral pulses, no appreciable edema in b/l LE  Neuro: Awake, alert      LABS:                        11.2   4.52  )-----------( 194      ( 11 Jun 2019 07:25 )             35.4     06-11    136  |  101  |  12  ----------------------------<  110<H>  4.2   |  21<L>  |  0.55    Ca    8.5      11 Jun 2019 07:25  Phos  3.0     06-11  Mg     2.0     06-11    LIVER FUNCTIONS - ( 09 Jun 2019 06:41 )  Alb: 3.9 g/dL / Pro: 6.4 g/dL / ALK PHOS: 47 u/L / ALT: 13 u/L / AST: 19 u/L / GGT: x     / T. Bili 0.3 mg/dL / D. Bili x         Microbiology:    RADIOLOGY & ADDITIONAL TESTS:    Imaging Personally Reviewed:     Consultant(s) Notes Reviewed:    GI Fellow Dr. Hall - eNel consistent with GIST, patient tolerated diagnostic procedure well    Care Discussed with Consultants/Other Providers   Dr. Montejo Surgical Oncology Attending - plan for outpatient GIST resection Chief Complaint: Patient is a 68y old  Female who presents with a chief complaint of abdominal pain (12 Jun 2019 07:55)      INTERVAL HPI/OVERNIGHT EVENTS:   WIthout acute events overnight. Pathology has returned positive for GIST, patient was evaluated by Surg Onc and GI this morning.      MEDICATIONS  (STANDING):  amLODIPine   Tablet 5 milliGRAM(s) Oral daily  artificial tears (preservative free) Ophthalmic Solution 1 Drop(s) Both EYES four times a day  dextrose 5% + sodium chloride 0.45%. 1000 milliLiter(s) (75 mL/Hr) IV Continuous <Continuous>  docusate sodium 100 milliGRAM(s) Oral three times a day  heparin  Injectable 5000 Unit(s) SubCutaneous every 8 hours  lisinopril 20 milliGRAM(s) Oral daily  polyethylene glycol 3350 17 Gram(s) Oral daily  senna 2 Tablet(s) Oral at bedtime  tenofovir disoproxil fumarate (VIREAD) 300 milliGRAM(s) Oral daily    MEDICATIONS  (PRN):  acetaminophen   Tablet .. 650 milliGRAM(s) Oral every 6 hours PRN Moderate Pain (4 - 6)  ALBUTerol    90 MICROgram(s) HFA Inhaler 2 Puff(s) Inhalation every 6 hours PRN Shortness of Breath and/or Wheezing  lidocaine   Patch 1 Patch Transdermal every 24 hours PRN Right knee pain  lidocaine   Patch 1 Patch Transdermal every 24 hours PRN Left knee pain  morphine  - Injectable 2 milliGRAM(s) IV Push every 4 hours PRN Moderate Pain (4 - 6)  morphine  - Injectable 4 milliGRAM(s) IV Push every 4 hours PRN Severe Pain (7 - 10)  ondansetron Injectable 4 milliGRAM(s) IV Push every 6 hours PRN Nausea and/or Vomiting      Vital Signs Last 24 Hrs  T(C): 36.7 (12 Jun 2019 05:42), Max: 36.7 (11 Jun 2019 15:23)  T(F): 98 (12 Jun 2019 05:42), Max: 98.1 (11 Jun 2019 15:23)  HR: 67 (12 Jun 2019 05:42) (67 - 72)  BP: 139/71 (12 Jun 2019 05:42) (137/62 - 155/82)  BP(mean): --  RR: 18 (12 Jun 2019 05:42) (16 - 18)  SpO2: 100% (12 Jun 2019 05:42) (98% - 100%)    PHYSICAL EXAM:    GENERAL: NAD  Pulm: CTA b/l  CV: S1&S2+, rrr  ABDOMEN: bs+, soft, nt, nd. Palpable mass in left upper quadrant.   EXTREMITIES:  2+ peripheral pulses, no appreciable edema in b/l LE  Neuro: Awake, alert      LABS:                        11.2   4.52  )-----------( 194      ( 11 Jun 2019 07:25 )             35.4     06-11    136  |  101  |  12  ----------------------------<  110<H>  4.2   |  21<L>  |  0.55    Ca    8.5      11 Jun 2019 07:25  Phos  3.0     06-11  Mg     2.0     06-11    LIVER FUNCTIONS - ( 09 Jun 2019 06:41 )  Alb: 3.9 g/dL / Pro: 6.4 g/dL / ALK PHOS: 47 u/L / ALT: 13 u/L / AST: 19 u/L / GGT: x     / T. Bili 0.3 mg/dL / D. Bili x         Microbiology:    RADIOLOGY & ADDITIONAL TESTS:    Imaging Personally Reviewed:     Consultant(s) Notes Reviewed:    GI Fellow Dr. Hall - Path consistent with GIST, patient tolerated diagnostic procedure well    Care Discussed with Consultants/Other Providers   Dr. Montejo Surgical Oncology Attending - plan for outpatient GIST resection

## 2019-06-12 NOTE — PROGRESS NOTE ADULT - SUBJECTIVE AND OBJECTIVE BOX
Chief Complaint:  Patient is a 68y old  Female who presents with a chief complaint of abdominal pain (11 Jun 2019 09:45)      Interval Events: No adverse events overnight.  Path returned c/w GIST.      Allergies:  Bananas (Pruritus)  Kiwi (Pruritus)  No Known Drug Allergies  Seafood (Pruritus)  strawberry (Anaphylaxis)      Hospital Medications:  acetaminophen   Tablet .. 650 milliGRAM(s) Oral every 6 hours PRN  ALBUTerol    90 MICROgram(s) HFA Inhaler 2 Puff(s) Inhalation every 6 hours PRN  amLODIPine   Tablet 5 milliGRAM(s) Oral daily  artificial tears (preservative free) Ophthalmic Solution 1 Drop(s) Both EYES four times a day  dextrose 5% + sodium chloride 0.45%. 1000 milliLiter(s) IV Continuous <Continuous>  docusate sodium 100 milliGRAM(s) Oral three times a day  heparin  Injectable 5000 Unit(s) SubCutaneous every 8 hours  lidocaine   Patch 1 Patch Transdermal every 24 hours PRN  lidocaine   Patch 1 Patch Transdermal every 24 hours PRN  lisinopril 20 milliGRAM(s) Oral daily  morphine  - Injectable 2 milliGRAM(s) IV Push every 4 hours PRN  morphine  - Injectable 4 milliGRAM(s) IV Push every 4 hours PRN  ondansetron Injectable 4 milliGRAM(s) IV Push every 6 hours PRN  polyethylene glycol 3350 17 Gram(s) Oral daily  senna 2 Tablet(s) Oral at bedtime  tenofovir disoproxil fumarate (VIREAD) 300 milliGRAM(s) Oral daily      PMHX/PSHX:  Anemia  Thyroid nodule  Stomach cancer  Obese  RA (rheumatoid arthritis)  Stomach neoplasm  Hypertension  H/O laparoscopy  H/O myomectomy  No significant past surgical history      Family history:  No pertinent family history in first degree relatives      ROS:     General:  No wt loss, fevers, chills, night sweats, fatigue,   Eyes:  Good vision, no reported pain  ENT:  No sore throat, pain, runny nose, dysphagia  CV:  No pain, palpitations, hypo/hypertension  Resp:  No dyspnea, cough, tachypnea, wheezing  GI:  See HPI  :  No pain, bleeding, incontinence, nocturia  Muscle:  No pain, weakness  Neuro:  No weakness, tingling, memory problems  Psych:  No fatigue, insomnia, mood problems, depression  Endocrine:  No polyuria, polydipsia, cold/heat intolerance  Heme:  No petechiae, ecchymosis, easy bruisability  Skin:  No rash, edema      PHYSICAL EXAM:     GENERAL: NAD  HEENT:  NC/AT  CHEST:  Full & symmetric excursion, no increased effort  ABDOMEN:  Soft, non-tender, non-distended, +BS  EXTREMITIES:  no edema  SKIN:  No rash  NEURO:  Alert    Vital Signs:  Vital Signs Last 24 Hrs  T(C): 36.7 (12 Jun 2019 05:42), Max: 36.7 (11 Jun 2019 15:23)  T(F): 98 (12 Jun 2019 05:42), Max: 98.1 (11 Jun 2019 15:23)  HR: 67 (12 Jun 2019 05:42) (67 - 72)  BP: 139/71 (12 Jun 2019 05:42) (137/62 - 155/82)  BP(mean): --  RR: 18 (12 Jun 2019 05:42) (16 - 18)  SpO2: 100% (12 Jun 2019 05:42) (98% - 100%)  Daily     Daily     LABS:                        11.2   4.52  )-----------( 194      ( 11 Jun 2019 07:25 )             35.4     06-11    136  |  101  |  12  ----------------------------<  110<H>  4.2   |  21<L>  |  0.55    Ca    8.5      11 Jun 2019 07:25  Phos  3.0     06-11  Mg     2.0     06-11      Surgical Pathology Report:   ACCESSION No:  80 [id number]    JENNIE GIBSON                     1        Surgical Final Report          Final Diagnosis  1-Gastric mass (FNB):  - Gastrointestinal stromal tumor (GIST).    Note: Tumor cells are diffusely positive for DOG-1, c-kit, CD34,  and Caldesmon, weakly positive for SMA, negative  for Desmin and S-100.    Verified by: Kameron Huffman M.D.  (Electronic Signature)  Reported on: 06/11/19 13:05 EDT, [address] [place] [address]  [id number]  _________________________________________________________________    Clinical History  History of GIST    Specimen(s) Submitted  80 [id number]  1-Gastric mass (FNB)    Gross Description  1. The specimen is received in formalin and the specimen  container is labeled: Gastric mass (FNB).  It consists of a 2.3 x  0.5 x 0.1 cm aggregate of brown cylindrical cores.  Entirely  submitted.  One cassette.    In addition to other data that may appear on the specimen  container, the label has been inspected to confirm the presence  of the patient's name and date of birth.    Andrzej Melendez 06/06/2019 18:56 (06.06.19 @ 12:28)              Imaging:  reviewed

## 2019-06-12 NOTE — PROGRESS NOTE ADULT - PROBLEM SELECTOR PLAN 7
DVT PPx: HSQ in anticipation of possible surgical intervention
DVT PPx: HSQ in anticipation of possible surgical intervention    Rowena Kathleen  PGY2  [phone]
DVT PPx: HSQ in anticipation of possible surgical intervention    Rowena Kathleen  PGY2  [phone]
DVT PPx: HSQ in anticipation of possible surgical intervention
DVT PPx: HSQ in anticipation of possible surgical intervention    Rowena Kathleen  PGY2  [phone]
DVT PPx: HSQ in anticipation of possible surgical intervention    Rowena Kathleen  PGY2  [phone]
DVT PPx: HSQ in anticipation of possible surgical intervention    Rowena Kathleen  PGY2  [phone]
DVT PPx: HSQ in anticipation of possible surgical intervention    Samuel Angulo M.D.  Medicine Attending  [phone] ([place]) [phone] ([facility])
DVT PPx: HSQ in anticipation of possible surgical intervention    Samuel Angulo M.D.  Medicine Attending  [phone] ([place]) [phone] ([facility])
DVT PPx: HSQ in anticipation of possible surgical intervention    Samuel Angulo M.D.  Medicine Attending  [phone] ([place]) [phone] ([facility])
DVT PPx: HSQ in anticipation of possible surgical intervention    Rowena Kathleen  PGY2  [phone]
DVT PPx: HSQ in anticipation of possible surgical intervention    Rowena Kathleen  PGY2  [phone]

## 2019-06-12 NOTE — PROGRESS NOTE ADULT - ATTENDING COMMENTS
35 minutes spent discharge planning. Patient is stable for discharge with Surg Onc follow up outpatient with Dr. Montejo for resection and follow up with Medical Oncology Dr. Tolbert.     Samuel Angulo M.D.  Medicine Attending  [phone] ([place]) [phone] ([facility])

## 2019-06-12 NOTE — PROGRESS NOTE ADULT - ASSESSMENT
68 year old woman w/ PMH sig for metastatic GIST who presents with abdominal pain, with additional concern for possible pathologic fracture of R pubic ramus and possible metastasis to L3.    - MRI lumbosacral spine final read reviewed  - Will f/u pathology results from EGD/EUS on 6/6.  - Surgical planning for outpatient procedure, patient can contact Dr. Montejo's office for surgical scheduling  - Please reconsult with any additional questions or concerns    Discussed with Dr. Gustabo Zaldivar PGY-2  Surgery Pager [phone]

## 2019-06-24 ENCOUNTER — APPOINTMENT (OUTPATIENT)
Dept: SURGICAL ONCOLOGY | Facility: CLINIC | Age: 69
End: 2019-06-24

## 2019-06-25 ENCOUNTER — TRANSCRIPTION ENCOUNTER (OUTPATIENT)
Age: 69
End: 2019-06-25

## 2019-06-26 ENCOUNTER — RESULT REVIEW (OUTPATIENT)
Age: 69
End: 2019-06-26

## 2019-06-26 ENCOUNTER — INPATIENT (INPATIENT)
Facility: HOSPITAL | Age: 69
LOS: 12 days | Discharge: HOME | End: 2019-07-09
Attending: SURGERY | Admitting: SURGERY
Payer: MEDICARE

## 2019-06-26 ENCOUNTER — APPOINTMENT (OUTPATIENT)
Dept: SURGICAL ONCOLOGY | Facility: HOSPITAL | Age: 69
End: 2019-06-26

## 2019-06-26 VITALS
WEIGHT: 154.98 LBS | TEMPERATURE: 98 F | DIASTOLIC BLOOD PRESSURE: 64 MMHG | SYSTOLIC BLOOD PRESSURE: 163 MMHG | RESPIRATION RATE: 15 BRPM | HEIGHT: 65 IN | HEART RATE: 62 BPM | OXYGEN SATURATION: 100 %

## 2019-06-26 DIAGNOSIS — Z98.890 OTHER SPECIFIED POSTPROCEDURAL STATES: Chronic | ICD-10-CM

## 2019-06-26 DIAGNOSIS — C49.9 MALIGNANT NEOPLASM OF CONNECTIVE AND SOFT TISSUE, UNSPECIFIED: ICD-10-CM

## 2019-06-26 LAB
BLD GP AB SCN SERPL QL: NEGATIVE — SIGNIFICANT CHANGE UP
RH IG SCN BLD-IMP: POSITIVE — SIGNIFICANT CHANGE UP

## 2019-06-26 PROCEDURE — 88307 TISSUE EXAM BY PATHOLOGIST: CPT | Mod: 26

## 2019-06-26 PROCEDURE — 88331 PATH CONSLTJ SURG 1 BLK 1SPC: CPT | Mod: 26

## 2019-06-26 PROCEDURE — 88342 IMHCHEM/IMCYTCHM 1ST ANTB: CPT | Mod: 26

## 2019-06-26 PROCEDURE — 47100 WEDGE BIOPSY OF LIVER: CPT

## 2019-06-26 PROCEDURE — 88305 TISSUE EXAM BY PATHOLOGIST: CPT | Mod: 26

## 2019-06-26 PROCEDURE — 49203: CPT

## 2019-06-26 PROCEDURE — 88341 IMHCHEM/IMCYTCHM EA ADD ANTB: CPT | Mod: 26

## 2019-06-26 RX ORDER — LISINOPRIL 2.5 MG/1
1 TABLET ORAL
Qty: 0 | Refills: 0 | DISCHARGE

## 2019-06-26 RX ORDER — ONDANSETRON 8 MG/1
4 TABLET, FILM COATED ORAL EVERY 6 HOURS
Refills: 0 | Status: DISCONTINUED | OUTPATIENT
Start: 2019-06-26 | End: 2019-06-28

## 2019-06-26 RX ORDER — SODIUM CHLORIDE 9 MG/ML
1000 INJECTION, SOLUTION INTRAVENOUS
Refills: 0 | Status: DISCONTINUED | OUTPATIENT
Start: 2019-06-26 | End: 2019-06-27

## 2019-06-26 RX ORDER — TENOFOVIR DISOPROXIL FUMARATE 300 MG/1
1 TABLET, FILM COATED ORAL
Qty: 0 | Refills: 0 | DISCHARGE

## 2019-06-26 RX ORDER — HYDROXYCHLOROQUINE SULFATE 200 MG
1 TABLET ORAL
Qty: 0 | Refills: 0 | DISCHARGE

## 2019-06-26 RX ORDER — AMLODIPINE BESYLATE 2.5 MG/1
1 TABLET ORAL
Qty: 0 | Refills: 0 | DISCHARGE

## 2019-06-26 RX ORDER — HEPARIN SODIUM 5000 [USP'U]/ML
5000 INJECTION INTRAVENOUS; SUBCUTANEOUS EVERY 8 HOURS
Refills: 0 | Status: DISCONTINUED | OUTPATIENT
Start: 2019-06-26 | End: 2019-06-27

## 2019-06-26 RX ORDER — NALOXONE HYDROCHLORIDE 4 MG/.1ML
0.1 SPRAY NASAL
Refills: 0 | Status: DISCONTINUED | OUTPATIENT
Start: 2019-06-26 | End: 2019-06-28

## 2019-06-26 RX ORDER — HYDROMORPHONE HYDROCHLORIDE 2 MG/ML
0.5 INJECTION INTRAMUSCULAR; INTRAVENOUS; SUBCUTANEOUS
Refills: 0 | Status: DISCONTINUED | OUTPATIENT
Start: 2019-06-26 | End: 2019-06-28

## 2019-06-26 RX ORDER — ALBUTEROL 90 UG/1
2 AEROSOL, METERED ORAL
Qty: 0 | Refills: 0 | DISCHARGE

## 2019-06-26 RX ADMIN — Medication 1.25 MILLIGRAM(S): at 19:16

## 2019-06-26 RX ADMIN — SODIUM CHLORIDE 100 MILLILITER(S): 9 INJECTION, SOLUTION INTRAVENOUS at 13:15

## 2019-06-26 RX ADMIN — SODIUM CHLORIDE 100 MILLILITER(S): 9 INJECTION, SOLUTION INTRAVENOUS at 17:00

## 2019-06-26 NOTE — BRIEF OPERATIVE NOTE - COMMENTS
After infusion of pre-operative antibiotic (Cefotetan) had finished infusion, patient went into a brief (5-8 second) period of asystole, which spontaneously resolved. Asystole confirmed by sudden absence - and return - of arterial line waveform. No other dysrhythmias during the case.

## 2019-06-26 NOTE — H&P ADULT - NSHPPHYSICALEXAM_GEN_ALL_CORE
Physical Exam:  General: Well developed, well nourished, No Acute Distress  HEENT: Normocephalic Atraumatic, EOMI bl  Neurology: A&Ox3  Abdominal: Soft, Tender to palpation and percussion on LUQ, and mild tenderness to palpation/percussion on RLQ, Non-Distended, no rebound, no guarding  Extremities: No Clubbing, cyanosis or edema, FROM  Skin: warm, dry, normal color, no rash or abnormal lesions

## 2019-06-26 NOTE — BRIEF OPERATIVE NOTE - OPERATION/FINDINGS
Upper midline exploratory laparotomy performed. Recurrent GIST visualized within the gastrohepatic ligament. The mass was unruptured, and was infiltrating both the lesser curvature of the stomach and the left hepatic lobe. In addition, there were several other regional implants and reactive lymph nodes of the gastrohepatic ligament and the left hepatic lobe. The known segment 6 liver metastasis was palpated, and an additional segment 4 lesion was palpable as well. Omental implants and peritoneal implants were also identified. A biopsy of an omental implant was taken and sent for frozen (positive for metastatic GIST), and a biopsy of a superficial liver nodule was sent for permanent. Laparotomy closed primarily.

## 2019-06-26 NOTE — BRIEF OPERATIVE NOTE - NSICDXBRIEFPROCEDURE_GEN_ALL_CORE_FT
PROCEDURES:  Open liver biopsy 26-Jun-2019 13:17:30  Olu Ariza  Omental biopsy 26-Jun-2019 13:17:16  Olu Ariza  Exploratory laparotomy 26-Jun-2019 13:17:11  Olu Ariza

## 2019-06-26 NOTE — H&P ADULT - HISTORY OF PRESENT ILLNESS
68 year old female with hx of GIST tumor (s/p robotic partial gastric resection in 2017, intermittently on gleevec -->now on tasigna), HTN, RA who presents to [facility] recently with abdominal pain x 5 days. Work up identified recurrent GIST.  There was initally concern for bony metastases.  Workup during last admission included MRI of L/S spine and bony pelvis -- no evidence of bony mets.      In terms of her GIST tumor: diagnosed in 2017 w/ resection of the GIST tumor. Patient was on gleevec on three separate occasions but had to stop due to edema. Had a surveillance scan in 2018 which had revealed a lesion in the inferior pubic ramus and hypodense liver lesion which was biopsied 10/2018 with path report: metastatic GIST tumor. As per review - - was [place] [facility] [place]. Gastelum but had never went. Now sent in by her medical oncologist for surg onc evaluation.     She was discharged from the hospital and returns today through [facility] for admission and resection of the recurrent tumor and the isolated liver met.      Radiographic Findings:     < from: CT Angio Chest w/ IV Cont (05.29.19 @ 22:36) >  FINDINGS:    CHEST:     LUNGS AND LARGE AIRWAYS: Patent central airways. No pulmonary nodules.  PLEURA: No pleural effusion.  VESSELS: Atherosclerotic changes. Study is nondiagnostic for evaluation   of the pulmonary arteries due to poor opacification.  HEART: Heart size is normal. No pericardial effusion.  MEDIASTINUM AND ENRIQUE: No lymphadenopathy.  CHEST WALL AND LOWER NECK: Multiple heterogeneous thyroid nodules.    ABDOMEN AND PELVIS:    LIVER: Indeterminate hypodense lesion measuring 2.3 cm in the right   hepatic lobe (2:141).   BILE DUCTS: Normal caliber.  GALLBLADDER: Within normal limits.  SPLEEN: Within normal limits.  PANCREAS: Within normal limits.  ADRENALS: Within normal limits.  KIDNEYS/URETERS: No hydronephrosis.    BLADDER: Within normal limits.  REPRODUCTIVE ORGANS: Calcified uterine fibroid. Bilateral adnexa within   normal limits.    BOWEL: No bowel obstruction. Scattered colonic diverticuli. Appendix not   visualized. There is an exophytic mass measuring 5.6 x 4.6 x 5 cm in the   gastrohepatic space. It is unclear whether this is exophytic from the   left hepatic lobe or arising from the wall of the adjacent stomach..  PERITONEUM: No ascites.  VESSELS:  Atherosclerotic changes.  RETROPERITONEUM: No lymphadenopathy.    ABDOMINAL WALL: Within normal limits.  BONES: Degenerative changes.    IMPRESSION:     Study is nondiagnostic for evaluation for pulmonary emboli.    Solid mass in the gastrohepatic space demonstrates an interval increase   since prior study of 8/16/2018. It is unclear if this is exophytic from   the adjacent liver or arising from the gastric wall. Endoscopic   ultrasound is recommended.    An indeterminant hypodense lesion measuring 2.3 cm in the right hepatic   lobe, demonstrating interval increase in size since 8/16/2018. Primary   consideration is hepatic metastasis. Consider PET scan correlation.    < end of copied text >      Assessment and Recommendation:   · Assessment	  68 year old woman w/ PMH sig for metastatic GIST who presents with abdominal pain that worsened since her last visit 2 days ago.    Plan/recommendations:  - CT-Angiogram (done, final read pending)  - Pain control  - Care per primary team  - D/w Dr. Gustabo Stuart, PGY-2

## 2019-06-26 NOTE — H&P ADULT - NSHPLABSRESULTS_GEN_ALL_CORE
6/11/19    Sodium, Serum: 136 mmol/L    Potassium, Serum: 4.2 mmol/L    Chloride, Serum: 101 mmol/L    Carbon Dioxide, Serum: 21 mmol/L    Anion Gap, Serum: 14 mmo/L    Blood Urea Nitrogen, Serum: 12 mg/dL    Creatinine, Serum: 0.55 mg/dL    Glucose, Serum: 110 mg/dL    Magnesium, Serum: 2.0 mg/dL    Complete Blood Count + Automated Diff in AM (06.11.19 @ 07:25)    Nucleated RBC #: 0 K/uL    WBC Count: 4.52 K/uL    RBC Count: 4.35 M/uL    Hemoglobin: 11.2 g/dL    Hematocrit: 35.4 %    Platelet Count - Automated: 194 K/uL      Hepatitis C Virus RNA Detection by PCR (06.10.19 @ 22:30)    Hepatitis C RNA, Log Value: Not Detected HCV RNA Quantification     HIV Combo Result: NonReact    Hepatitis B Surface Antibody (06.10.19 @ 22:30)    Hepatitis B Surface Antibody: Reactive    Activated Partial Thromboplastin Time (06.07.19 @ 06:00)    Activated Partial Thromboplastin Time: 27.9    INR: 1.04 (06.07.19 @ 06:00)

## 2019-06-26 NOTE — BRIEF OPERATIVE NOTE - NSICDXBRIEFPOSTOP_GEN_ALL_CORE_FT
POST-OP DIAGNOSIS:  Peritoneal carcinomatosis 26-Jun-2019 13:19:31  Olu Ariza  Malignant GIST (gastrointestinal stromal tumor) 26-Jun-2019 13:19:20  Olu Ariza

## 2019-06-26 NOTE — PROGRESS NOTE ADULT - SUBJECTIVE AND OBJECTIVE BOX
Team Surgery Post Op Note     STATUS POST:  Open liver biopsy   Omental biopsy   Exploratory laparotomy    Patient seen and examined at bedside. Patient resting comfortably in bed, complaining of some abdominal pain, denies N/V, CP, SOB, or any other complaints at this time      Vital Signs Last 24 Hrs  T(C): 36.7 (26 Jun 2019 17:40), Max: 36.7 (26 Jun 2019 17:40)  T(F): 98.1 (26 Jun 2019 17:40), Max: 98.1 (26 Jun 2019 17:40)  HR: 61 (26 Jun 2019 17:40) (57 - 65)  BP: 155/70 (26 Jun 2019 17:40) (150/66 - 166/72)  BP(mean): 90 (26 Jun 2019 16:30) (86 - 97)  RR: 16 (26 Jun 2019 17:40) (12 - 17)  SpO2: 100% (26 Jun 2019 17:40) (99% - 100%)  Jimenez:  NGT:  I&O's Summary    26 Jun 2019 07:01  -  26 Jun 2019 19:05  --------------------------------------------------------  IN: 445 mL / OUT: 890 mL / NET: -445 mL      I&O's Detail    26 Jun 2019 07:01  -  26 Jun 2019 19:05  --------------------------------------------------------  IN:    lactated ringers.: 425 mL    Oral Fluid: 20 mL  Total IN: 445 mL    OUT:    Indwelling Catheter - Urethral: 890 mL  Total OUT: 890 mL    Total NET: -445 mL      PHYSICAL EXAM:  Gen: NAD  Cardio: S1S2  Pulm: CTA B/L  Abd: soft, some appropriate incisional tenderness, ND, dressing with some dried blood    A/P 68F s/p Open liver biopsy, Omental biopsy, Exploratory laparotomy    - pain control with PCEA  - OOB   - jimenez catheter  - NPO, possibly advance to clears  - f/u am labs

## 2019-06-26 NOTE — H&P ADULT - ASSESSMENT
68 year old woman w/ PMH sig for metastatic GIST   D/w pt plan for ex lap resection of abdominal mass, liver, resection/ablation of mass    Discussed r/b/a post op expectations poss complications.      Pt understands and agrees to proceed.

## 2019-06-27 ENCOUNTER — TRANSCRIPTION ENCOUNTER (OUTPATIENT)
Age: 69
End: 2019-06-27

## 2019-06-27 LAB
ANION GAP SERPL CALC-SCNC: 15 MMO/L — HIGH (ref 7–14)
APTT BLD: 26.4 SEC — LOW (ref 27.5–36.3)
BUN SERPL-MCNC: 9 MG/DL — SIGNIFICANT CHANGE UP (ref 7–23)
CALCIUM SERPL-MCNC: 8.3 MG/DL — LOW (ref 8.4–10.5)
CHLORIDE SERPL-SCNC: 101 MMOL/L — SIGNIFICANT CHANGE UP (ref 98–107)
CO2 SERPL-SCNC: 23 MMOL/L — SIGNIFICANT CHANGE UP (ref 22–31)
CREAT SERPL-MCNC: 0.57 MG/DL — SIGNIFICANT CHANGE UP (ref 0.5–1.3)
GLUCOSE SERPL-MCNC: 114 MG/DL — HIGH (ref 70–99)
HCT VFR BLD CALC: 34.5 % — SIGNIFICANT CHANGE UP (ref 34.5–45)
HGB BLD-MCNC: 11 G/DL — LOW (ref 11.5–15.5)
INR BLD: 1.08 — SIGNIFICANT CHANGE UP (ref 0.88–1.17)
MAGNESIUM SERPL-MCNC: 1.7 MG/DL — SIGNIFICANT CHANGE UP (ref 1.6–2.6)
MCHC RBC-ENTMCNC: 26 PG — LOW (ref 27–34)
MCHC RBC-ENTMCNC: 31.9 % — LOW (ref 32–36)
MCV RBC AUTO: 81.6 FL — SIGNIFICANT CHANGE UP (ref 80–100)
NRBC # FLD: 0 K/UL — SIGNIFICANT CHANGE UP (ref 0–0)
PHOSPHATE SERPL-MCNC: 3.5 MG/DL — SIGNIFICANT CHANGE UP (ref 2.5–4.5)
PLATELET # BLD AUTO: 203 K/UL — SIGNIFICANT CHANGE UP (ref 150–400)
PMV BLD: 10.7 FL — SIGNIFICANT CHANGE UP (ref 7–13)
POTASSIUM SERPL-MCNC: 4.3 MMOL/L — SIGNIFICANT CHANGE UP (ref 3.5–5.3)
POTASSIUM SERPL-SCNC: 4.3 MMOL/L — SIGNIFICANT CHANGE UP (ref 3.5–5.3)
PROTHROM AB SERPL-ACNC: 12 SEC — SIGNIFICANT CHANGE UP (ref 9.8–13.1)
RBC # BLD: 4.23 M/UL — SIGNIFICANT CHANGE UP (ref 3.8–5.2)
RBC # FLD: 13.9 % — SIGNIFICANT CHANGE UP (ref 10.3–14.5)
SODIUM SERPL-SCNC: 139 MMOL/L — SIGNIFICANT CHANGE UP (ref 135–145)
WBC # BLD: 6.56 K/UL — SIGNIFICANT CHANGE UP (ref 3.8–10.5)
WBC # FLD AUTO: 6.56 K/UL — SIGNIFICANT CHANGE UP (ref 3.8–10.5)

## 2019-06-27 RX ORDER — SODIUM CHLORIDE 9 MG/ML
1000 INJECTION, SOLUTION INTRAVENOUS
Refills: 0 | Status: DISCONTINUED | OUTPATIENT
Start: 2019-06-27 | End: 2019-07-08

## 2019-06-27 RX ORDER — LISINOPRIL 2.5 MG/1
20 TABLET ORAL DAILY
Refills: 0 | Status: DISCONTINUED | OUTPATIENT
Start: 2019-06-27 | End: 2019-07-09

## 2019-06-27 RX ORDER — ACETAMINOPHEN 500 MG
1000 TABLET ORAL ONCE
Refills: 0 | Status: DISCONTINUED | OUTPATIENT
Start: 2019-06-27 | End: 2019-07-09

## 2019-06-27 RX ORDER — AMLODIPINE BESYLATE 2.5 MG/1
5 TABLET ORAL DAILY
Refills: 0 | Status: DISCONTINUED | OUTPATIENT
Start: 2019-06-27 | End: 2019-07-09

## 2019-06-27 RX ORDER — MAGNESIUM SULFATE 500 MG/ML
2 VIAL (ML) INJECTION ONCE
Refills: 0 | Status: COMPLETED | OUTPATIENT
Start: 2019-06-27 | End: 2019-06-27

## 2019-06-27 RX ORDER — HYDRALAZINE HCL 50 MG
5 TABLET ORAL ONCE
Refills: 0 | Status: COMPLETED | OUTPATIENT
Start: 2019-06-27 | End: 2019-06-27

## 2019-06-27 RX ADMIN — Medication 1.25 MILLIGRAM(S): at 14:19

## 2019-06-27 RX ADMIN — HEPARIN SODIUM 5000 UNIT(S): 5000 INJECTION INTRAVENOUS; SUBCUTANEOUS at 05:53

## 2019-06-27 RX ADMIN — HEPARIN SODIUM 5000 UNIT(S): 5000 INJECTION INTRAVENOUS; SUBCUTANEOUS at 14:19

## 2019-06-27 RX ADMIN — Medication 5 MILLIGRAM(S): at 23:50

## 2019-06-27 RX ADMIN — Medication 50 GRAM(S): at 11:18

## 2019-06-27 RX ADMIN — SODIUM CHLORIDE 50 MILLILITER(S): 9 INJECTION, SOLUTION INTRAVENOUS at 10:15

## 2019-06-27 NOTE — DISCHARGE NOTE NURSING/CASE MANAGEMENT/SOCIAL WORK - NSDPDISTO_GEN_ALL_CORE
Home with assistance Patient is AXOX4. Denied chest pain and shortness of breath. Vital signs remained stable. Pain was assessed and remained at an acceptable level with interventions. Incentive spirometer encouraged. Patient ambulated in hallway. Patient safety maintained through out shift. Surgical incision is d/c/i. IV's have been removed and patient is ready for discharge./Home with assistance

## 2019-06-27 NOTE — PROGRESS NOTE ADULT - SUBJECTIVE AND OBJECTIVE BOX
SURGERY DAILY PROGRESS NOTE:       Subjective:  Patient seen and examined on AM rounds. No acute events overnight. AF/VSS. Pain controlled. Denies N/V and tolerating diet.       Objective:    PE:  Gen: NAD  Resp: respirations unlabored, no increased WoB  Abd: soft, ND, NT, no rebound or guarding, dressings c/d/i ***  Ext: no edema, WWP    Vital Signs Last 24 Hrs  T(C): 36.5 (27 Jun 2019 00:18), Max: 36.9 (26 Jun 2019 21:01)  T(F): 97.7 (27 Jun 2019 00:18), Max: 98.5 (26 Jun 2019 21:01)  HR: 55 (27 Jun 2019 00:18) (55 - 65)  BP: 133/55 (27 Jun 2019 00:18) (133/55 - 166/72)  BP(mean): 90 (26 Jun 2019 16:30) (86 - 97)  RR: 18 (27 Jun 2019 00:18) (12 - 18)  SpO2: 99% (27 Jun 2019 00:18) (99% - 100%)    I&O's Detail    26 Jun 2019 07:01  -  27 Jun 2019 05:40  --------------------------------------------------------  IN:    lactated ringers.: 425 mL    Oral Fluid: 20 mL  Total IN: 445 mL    OUT:    Indwelling Catheter - Urethral: 1690 mL  Total OUT: 1690 mL    Total NET: -1245 mL          Daily Height in cm: 165.1 (26 Jun 2019 08:07)    Daily     MEDICATIONS  (STANDING):  enalaprilat Injectable 1.25 milliGRAM(s) IV Push every 6 hours  heparin  Injectable 5000 Unit(s) SubCutaneous every 8 hours  HYDROmorphone (10 MICROgram(s)/mL) + BUpivacaine 0.0625% in 0.9% Sodium Chloride PCEA 250 milliLiter(s) Epidural PCA Continuous  lactated ringers. 1000 milliLiter(s) (100 mL/Hr) IV Continuous <Continuous>    MEDICATIONS  (PRN):  HYDROmorphone  Injectable 0.5 milliGRAM(s) IV Push every 3 hours PRN Severe breakthrough Pain (7 - 10)  HYDROmorphone (10 MICROgram(s)/mL) + BUpivacaine 0.0625% in 0.9% Sodium Chloride PCEA Rescue Clinician  Bolus 5 milliLiter(s) Epidural every 15 minutes PRN for Pain Score greater than 6  naloxone Injectable 0.1 milliGRAM(s) IV Push every 3 minutes PRN For ANY of the following changes in patient status:  A. RR LESS THAN 10 breaths per minute, B. Oxygen saturation LESS THAN 90%, C. Sedation score of 6  ondansetron Injectable 4 milliGRAM(s) IV Push every 6 hours PRN Nausea      LABS:                RADIOLOGY & ADDITIONAL STUDIES:

## 2019-06-27 NOTE — PROGRESS NOTE ADULT - SUBJECTIVE AND OBJECTIVE BOX
ANESTHESIA POSTOP CHECK    68y Female POSTOP DAY 1 S/P     [ X] NO APPARENT ANESTHESIA COMPLICATIONS      Comments:

## 2019-06-27 NOTE — PROGRESS NOTE ADULT - ASSESSMENT
Assessment: 68F s/p Open liver biopsy, Omental biopsy, Exploratory laparotomy 6/26    Plan:  - pain control with PCEA  - OOB   - jimenez catheter  - NPO, possibly advance to clears  - f/u am labs    [id number]

## 2019-06-27 NOTE — DISCHARGE NOTE NURSING/CASE MANAGEMENT/SOCIAL WORK - NSDCDPATPORTLINK_GEN_ALL_CORE
You can access the [facility] Patient Portal, offered by [facility], by registering with the following website: [email or web address]

## 2019-06-27 NOTE — PROGRESS NOTE ADULT - SUBJECTIVE AND OBJECTIVE BOX
Anesthesia Pain Management Service    SUBJECTIVE: Pt doing well with PCEA without problems reported.    Therapy:	  [ ] IV PCA	   [ X] Epidural           [ ] s/p Spinal Opoid              [ ] Postpartum infusion	  [ ] Patient controlled regional anesthesia (PCRA)    [ ] prn Analgesics    Allergies    Bananas (Pruritus)  cefotetan (Hypotension)  Kiwi (Pruritus)  Seafood (Pruritus)  strawberry (Anaphylaxis)    Intolerances      MEDICATIONS  (STANDING):  acetaminophen  IVPB .. 1000 milliGRAM(s) IV Intermittent once  dextrose 5% + sodium chloride 0.45%. 1000 milliLiter(s) (50 mL/Hr) IV Continuous <Continuous>  enalaprilat Injectable 1.25 milliGRAM(s) IV Push every 6 hours  heparin  Injectable 5000 Unit(s) SubCutaneous every 8 hours  HYDROmorphone (10 MICROgram(s)/mL) + BUpivacaine 0.0625% in 0.9% Sodium Chloride PCEA 250 milliLiter(s) Epidural PCA Continuous    MEDICATIONS  (PRN):  HYDROmorphone  Injectable 0.5 milliGRAM(s) IV Push every 3 hours PRN Severe breakthrough Pain (7 - 10)  HYDROmorphone (10 MICROgram(s)/mL) + BUpivacaine 0.0625% in 0.9% Sodium Chloride PCEA Rescue Clinician  Bolus 5 milliLiter(s) Epidural every 15 minutes PRN for Pain Score greater than 6  naloxone Injectable 0.1 milliGRAM(s) IV Push every 3 minutes PRN For ANY of the following changes in patient status:  A. RR LESS THAN 10 breaths per minute, B. Oxygen saturation LESS THAN 90%, C. Sedation score of 6  ondansetron Injectable 4 milliGRAM(s) IV Push every 6 hours PRN Nausea      OBJECTIVE:   [X] No new signs     [ ] Other:    Side Effects:  [X ] None			[ ] Other:    Assessment of Catheter Site:		[ X] Intact		[ ] Other:    ASSESSMENT/PLAN  [ X] Continue current therapy    [ ] Therapy changed to:    [ ] IV PCA       [ ] Epidural     [ ] prn Analgesics     Comments: Continue current PCEA settings.

## 2019-06-27 NOTE — PROVIDER CONTACT NOTE (OTHER) - SITUATION
pt had liver biopsy on 6/26 currently on vasotec iv push, no hold parameters for heart rate just for BP

## 2019-06-27 NOTE — PROGRESS NOTE ADULT - SUBJECTIVE AND OBJECTIVE BOX
Anesthesia Pain Management Service: Day 2__ of Epidural    SUBJECTIVE: Patient doing well with PCEA and no problems.  Patient said she is upset because the doctor just talked to her about some news.  Pain Scale Score: 6/10  Refer to charted pain scores    THERAPY:  [x ] Epidural Bupivacaine 0.0625% and Hydromorphone  		[ X] 10 micrograms/mL	[ ] 5 micrograms/mL  [ ] Epidural Bupivacaine 0.0625% and Fentanyl - 2 micrograms/mL  [ ] Epidural Ropivacaine 0.1% plain – 1 mg/mL  [ ] Patient Controlled Regional Anesthesia (PCRA) Ropivacaine  		[ ] 0.2%			[ ] 0.1%    Demand dose __3_ lockout __15_ (minutes) Continuous Rate _6__ Total: __139.9__ ml used (in past 24 hours)      MEDICATIONS  (STANDING):  acetaminophen  IVPB .. 1000 milliGRAM(s) IV Intermittent once  enalaprilat Injectable 1.25 milliGRAM(s) IV Push every 6 hours  heparin  Injectable 5000 Unit(s) SubCutaneous every 8 hours  HYDROmorphone (10 MICROgram(s)/mL) + BUpivacaine 0.0625% in 0.9% Sodium Chloride PCEA 250 milliLiter(s) Epidural PCA Continuous  lactated ringers. 1000 milliLiter(s) (100 mL/Hr) IV Continuous <Continuous>    MEDICATIONS  (PRN):  HYDROmorphone  Injectable 0.5 milliGRAM(s) IV Push every 3 hours PRN Severe breakthrough Pain (7 - 10)  HYDROmorphone (10 MICROgram(s)/mL) + BUpivacaine 0.0625% in 0.9% Sodium Chloride PCEA Rescue Clinician  Bolus 5 milliLiter(s) Epidural every 15 minutes PRN for Pain Score greater than 6  naloxone Injectable 0.1 milliGRAM(s) IV Push every 3 minutes PRN For ANY of the following changes in patient status:  A. RR LESS THAN 10 breaths per minute, B. Oxygen saturation LESS THAN 90%, C. Sedation score of 6  ondansetron Injectable 4 milliGRAM(s) IV Push every 6 hours PRN Nausea      OBJECTIVE:  Patient lying in bed, NPO.    Assessment of Catheter Site:	[ ] Left	[ ] Right  [x ] Epidural 	[ ] Femoral	      [ ] Saphenous   [ ] Supraclavicular   [ ] Other:    [x ] Dressing intact	[x ] Site non-tender	[ x] Site without erythema, discharge, edema  [x ] Epidural tubing and connection checked	[x] Gross neurological exam within normal limits  [ ] Catheter removed – tip intact		[ ] Afebrile  	[ ] Febrile: ___   [ X] see Temp under VS below)    PT/INR - ( 27 Jun 2019 05:57 )   PT: 12.0 SEC;   INR: 1.08          PTT - ( 27 Jun 2019 05:57 )  PTT:26.4 SEC                      11.0   6.56  )-----------( 203      ( 27 Jun 2019 05:57 )             34.5     Vital Signs Last 24 Hrs  T(C): 37.1 (06-27-19 @ 08:17), Max: 37.1 (06-27-19 @ 08:17)  T(F): 98.8 (06-27-19 @ 08:17), Max: 98.8 (06-27-19 @ 08:17)  HR: 54 (06-27-19 @ 08:17) (54 - 65)  BP: 141/54 (06-27-19 @ 08:17) (128/60 - 166/72)  BP(mean): 90 (06-26-19 @ 16:30) (86 - 97)  RR: 16 (06-27-19 @ 08:17) (12 - 18)  SpO2: 97% (06-27-19 @ 08:17) (97% - 100%)      Sedation Score:	[x ] Alert	[ ] Drowsy	[ ] Arousable	[ ] Asleep	[ ] Unresponsive    Side Effects:	[x ] None	[ ] Nausea	[ ] Vomiting	[ ] Pruritus  		[ ] Weakness		[ ] Numbness	[ ] Other:    ASSESSMENT/ PLAN:    Therapy to  be:	[x ] Continue   [ ] Discontinued   [ ] Change to prn Analgesics    Documentation and Verification of current medications:  [ X ] Done	[ ] Not done, not eligible, reason:    Comments: Doing OK with epidural and may continue.  IV Tylenol added. Anesthesia Pain Management Service: Day 2__ of Epidural    SUBJECTIVE: Patient doing well with PCEA and no problems.  Patient said she is upset because the doctor just talked to her about some news.  Pain Scale Score: 6/10  Refer to charted pain scores    THERAPY:  [x ] Epidural Bupivacaine 0.0625% and Hydromorphone  		[ X] 10 micrograms/mL	[ ] 5 micrograms/mL  [ ] Epidural Bupivacaine 0.0625% and Fentanyl - 2 micrograms/mL  [ ] Epidural Ropivacaine 0.1% plain – 1 mg/mL  [ ] Patient Controlled Regional Anesthesia (PCRA) Ropivacaine  		[ ] 0.2%			[ ] 0.1%    Demand dose __3_ lockout __15_ (minutes) Continuous Rate _6__ Total: __139.9__ ml used (in past 24 hours)      MEDICATIONS  (STANDING):  acetaminophen  IVPB .. 1000 milliGRAM(s) IV Intermittent once  enalaprilat Injectable 1.25 milliGRAM(s) IV Push every 6 hours  heparin  Injectable 5000 Unit(s) SubCutaneous every 8 hours  HYDROmorphone (10 MICROgram(s)/mL) + BUpivacaine 0.0625% in 0.9% Sodium Chloride PCEA 250 milliLiter(s) Epidural PCA Continuous  lactated ringers. 1000 milliLiter(s) (100 mL/Hr) IV Continuous <Continuous>    MEDICATIONS  (PRN):  HYDROmorphone  Injectable 0.5 milliGRAM(s) IV Push every 3 hours PRN Severe breakthrough Pain (7 - 10)  HYDROmorphone (10 MICROgram(s)/mL) + BUpivacaine 0.0625% in 0.9% Sodium Chloride PCEA Rescue Clinician  Bolus 5 milliLiter(s) Epidural every 15 minutes PRN for Pain Score greater than 6  naloxone Injectable 0.1 milliGRAM(s) IV Push every 3 minutes PRN For ANY of the following changes in patient status:  A. RR LESS THAN 10 breaths per minute, B. Oxygen saturation LESS THAN 90%, C. Sedation score of 6  ondansetron Injectable 4 milliGRAM(s) IV Push every 6 hours PRN Nausea      OBJECTIVE:  Patient lying in bed, NPO.    Assessment of Catheter Site:	[ ] Left	[ ] Right  [x ] Epidural 	[ ] Femoral	      [ ] Saphenous   [ ] Supraclavicular   [ ] Other:    [x ] Dressing intact	[x ] Site non-tender	[ x] Site without erythema, discharge, edema  [x ] Epidural tubing and connection checked	[x] Gross neurological exam within normal limits  [ ] Catheter removed – tip intact		[ ] Afebrile  	[ ] Febrile: ___   [ X] see Temp under VS below)    PT/INR - ( 27 Jun 2019 05:57 )   PT: 12.0 SEC;   INR: 1.08          PTT - ( 27 Jun 2019 05:57 )  PTT:26.4 SEC                      11.0   6.56  )-----------( 203      ( 27 Jun 2019 05:57 )             34.5     Vital Signs Last 24 Hrs  T(C): 37.1 (06-27-19 @ 08:17), Max: 37.1 (06-27-19 @ 08:17)  T(F): 98.8 (06-27-19 @ 08:17), Max: 98.8 (06-27-19 @ 08:17)  HR: 54 (06-27-19 @ 08:17) (54 - 65)  BP: 141/54 (06-27-19 @ 08:17) (128/60 - 166/72)  BP(mean): 90 (06-26-19 @ 16:30) (86 - 97)  RR: 16 (06-27-19 @ 08:17) (12 - 18)  SpO2: 97% (06-27-19 @ 08:17) (97% - 100%)      Sedation Score:	[x ] Alert	[ ] Drowsy	[ ] Arousable	[ ] Asleep	[ ] Unresponsive    Side Effects:	[x ] None	[ ] Nausea	[ ] Vomiting	[ ] Pruritus  		[ ] Weakness		[ ] Numbness	[ ] Other:    ASSESSMENT/ PLAN:    Therapy to  be:	[x ] Continue   [ ] Discontinued   [ ] Change to prn Analgesics    Documentation and Verification of current medications:  [ X ] Done	[ ] Not done, not eligible, reason:    Comments: Doing OK with epidural and may continue.  IV Tylenol added. Will inform RN of patient's current mood.

## 2019-06-28 LAB
ANION GAP SERPL CALC-SCNC: 11 MMO/L — SIGNIFICANT CHANGE UP (ref 7–14)
APTT BLD: 26.4 SEC — LOW (ref 27.5–36.3)
BUN SERPL-MCNC: 5 MG/DL — LOW (ref 7–23)
CALCIUM SERPL-MCNC: 8.4 MG/DL — SIGNIFICANT CHANGE UP (ref 8.4–10.5)
CHLORIDE SERPL-SCNC: 101 MMOL/L — SIGNIFICANT CHANGE UP (ref 98–107)
CO2 SERPL-SCNC: 25 MMOL/L — SIGNIFICANT CHANGE UP (ref 22–31)
CREAT SERPL-MCNC: 0.54 MG/DL — SIGNIFICANT CHANGE UP (ref 0.5–1.3)
GLUCOSE SERPL-MCNC: 123 MG/DL — HIGH (ref 70–99)
HCT VFR BLD CALC: 37 % — SIGNIFICANT CHANGE UP (ref 34.5–45)
HGB BLD-MCNC: 11.7 G/DL — SIGNIFICANT CHANGE UP (ref 11.5–15.5)
INR BLD: 1.1 — SIGNIFICANT CHANGE UP (ref 0.88–1.17)
MAGNESIUM SERPL-MCNC: 2.2 MG/DL — SIGNIFICANT CHANGE UP (ref 1.6–2.6)
MCHC RBC-ENTMCNC: 25.8 PG — LOW (ref 27–34)
MCHC RBC-ENTMCNC: 31.6 % — LOW (ref 32–36)
MCV RBC AUTO: 81.7 FL — SIGNIFICANT CHANGE UP (ref 80–100)
NRBC # FLD: 0 K/UL — SIGNIFICANT CHANGE UP (ref 0–0)
PHOSPHATE SERPL-MCNC: 2.2 MG/DL — LOW (ref 2.5–4.5)
PLATELET # BLD AUTO: 214 K/UL — SIGNIFICANT CHANGE UP (ref 150–400)
PMV BLD: 11.1 FL — SIGNIFICANT CHANGE UP (ref 7–13)
POTASSIUM SERPL-MCNC: 4.1 MMOL/L — SIGNIFICANT CHANGE UP (ref 3.5–5.3)
POTASSIUM SERPL-SCNC: 4.1 MMOL/L — SIGNIFICANT CHANGE UP (ref 3.5–5.3)
PROTHROM AB SERPL-ACNC: 12.6 SEC — SIGNIFICANT CHANGE UP (ref 9.8–13.1)
RBC # BLD: 4.53 M/UL — SIGNIFICANT CHANGE UP (ref 3.8–5.2)
RBC # FLD: 14 % — SIGNIFICANT CHANGE UP (ref 10.3–14.5)
SODIUM SERPL-SCNC: 137 MMOL/L — SIGNIFICANT CHANGE UP (ref 135–145)
WBC # BLD: 5.91 K/UL — SIGNIFICANT CHANGE UP (ref 3.8–10.5)
WBC # FLD AUTO: 5.91 K/UL — SIGNIFICANT CHANGE UP (ref 3.8–10.5)

## 2019-06-28 RX ORDER — ENOXAPARIN SODIUM 100 MG/ML
40 INJECTION SUBCUTANEOUS DAILY
Refills: 0 | Status: DISCONTINUED | OUTPATIENT
Start: 2019-06-28 | End: 2019-07-09

## 2019-06-28 RX ORDER — OXYCODONE HYDROCHLORIDE 5 MG/1
5 TABLET ORAL
Refills: 0 | Status: DISCONTINUED | OUTPATIENT
Start: 2019-06-28 | End: 2019-06-28

## 2019-06-28 RX ORDER — ENOXAPARIN SODIUM 100 MG/ML
40 INJECTION SUBCUTANEOUS DAILY
Refills: 0 | Status: DISCONTINUED | OUTPATIENT
Start: 2019-06-28 | End: 2019-06-28

## 2019-06-28 RX ORDER — HYDROMORPHONE HYDROCHLORIDE 2 MG/ML
0.5 INJECTION INTRAMUSCULAR; INTRAVENOUS; SUBCUTANEOUS
Refills: 0 | Status: DISCONTINUED | OUTPATIENT
Start: 2019-06-28 | End: 2019-06-29

## 2019-06-28 RX ORDER — OXYCODONE HYDROCHLORIDE 5 MG/1
10 TABLET ORAL
Refills: 0 | Status: DISCONTINUED | OUTPATIENT
Start: 2019-06-28 | End: 2019-06-28

## 2019-06-28 RX ORDER — OXYCODONE HYDROCHLORIDE 5 MG/1
10 TABLET ORAL
Refills: 0 | Status: DISCONTINUED | OUTPATIENT
Start: 2019-06-28 | End: 2019-07-02

## 2019-06-28 RX ORDER — ENOXAPARIN SODIUM 100 MG/ML
40 INJECTION SUBCUTANEOUS
Qty: 30 | Refills: 0
Start: 2019-06-28 | End: 2019-07-27

## 2019-06-28 RX ADMIN — HYDROMORPHONE HYDROCHLORIDE 0.5 MILLIGRAM(S): 2 INJECTION INTRAMUSCULAR; INTRAVENOUS; SUBCUTANEOUS at 14:51

## 2019-06-28 RX ADMIN — LISINOPRIL 20 MILLIGRAM(S): 2.5 TABLET ORAL at 12:54

## 2019-06-28 RX ADMIN — HYDROMORPHONE HYDROCHLORIDE 0.5 MILLIGRAM(S): 2 INJECTION INTRAMUSCULAR; INTRAVENOUS; SUBCUTANEOUS at 15:21

## 2019-06-28 RX ADMIN — Medication 63.75 MILLIMOLE(S): at 12:55

## 2019-06-28 RX ADMIN — SODIUM CHLORIDE 50 MILLILITER(S): 9 INJECTION, SOLUTION INTRAVENOUS at 06:47

## 2019-06-28 RX ADMIN — HYDROMORPHONE HYDROCHLORIDE 0.5 MILLIGRAM(S): 2 INJECTION INTRAMUSCULAR; INTRAVENOUS; SUBCUTANEOUS at 22:49

## 2019-06-28 RX ADMIN — AMLODIPINE BESYLATE 5 MILLIGRAM(S): 2.5 TABLET ORAL at 12:54

## 2019-06-28 RX ADMIN — ENOXAPARIN SODIUM 40 MILLIGRAM(S): 100 INJECTION SUBCUTANEOUS at 16:32

## 2019-06-28 RX ADMIN — HYDROMORPHONE HYDROCHLORIDE 0.5 MILLIGRAM(S): 2 INJECTION INTRAMUSCULAR; INTRAVENOUS; SUBCUTANEOUS at 22:19

## 2019-06-28 NOTE — PROGRESS NOTE ADULT - SUBJECTIVE AND OBJECTIVE BOX
Anesthesia Pain Management Service: Day _3_ of Epidural    SUBJECTIVE: Patient doing well with PCEA and no problems.  Pain Scale Score:   Refer to charted pain scores    THERAPY:  [x ] Epidural Bupivacaine 0.0625% and Hydromorphone  		[X ] 10 micrograms/mL	[ ] 5 micrograms/mL  [ ] Epidural Bupivacaine 0.0625% and Fentanyl - 2 micrograms/mL  [ ] Epidural Ropivacaine 0.1% plain – 1 mg/mL  [ ] Patient Controlled Regional Anesthesia (PCRA) Ropivacaine  		[ ] 0.2%			[ ] 0.1%    Demand dose __3_ lockout __15_ (minutes) Continuous Rate _6__ Total: __158ml_ Daily      MEDICATIONS  (STANDING):  acetaminophen  IVPB .. 1000 milliGRAM(s) IV Intermittent once  amLODIPine   Tablet 5 milliGRAM(s) Oral daily  dextrose 5% + sodium chloride 0.45%. 1000 milliLiter(s) (50 mL/Hr) IV Continuous <Continuous>  lisinopril 20 milliGRAM(s) Oral daily  sodium phosphate IVPB 15 milliMole(s) IV Intermittent once    MEDICATIONS  (PRN):  HYDROmorphone  Injectable 0.5 milliGRAM(s) IV Push every 3 hours PRN Severe Pain (7 - 10)  oxyCODONE    IR 5 milliGRAM(s) Oral every 3 hours PRN Moderate Pain (4 - 6)  oxyCODONE    IR 10 milliGRAM(s) Oral every 3 hours PRN Severe Pain (7 - 10)      OBJECTIVE: sitting up in bed     Assessment of Catheter Site:	[ ] Left	[ ] Right  [x ] Epidural 	[ ] Femoral	      [ ] Saphenous   [ ] Supraclavicular   [ ] Other:    [x ] Dressing intact	[x ] Site non-tender	[ x] Site without erythema, discharge, edema  [x ] Epidural tubing and connection checked	[x] Gross neurological exam within normal limits  [X ] Catheter removed – tip intact		[ ] Afebrile	  [ ] Febrile: ___       [ X] see Temp under VS below)    PT/INR - ( 28 Jun 2019 07:11 )   PT: 12.6 SEC;   INR: 1.10          PTT - ( 28 Jun 2019 07:11 )  PTT:26.4 SEC                      11.7   5.91  )-----------( 214      ( 28 Jun 2019 07:11 )             37.0     Vital Signs Last 24 Hrs  T(C): 37.1 (06-28-19 @ 06:43), Max: 37.3 (06-27-19 @ 22:54)  T(F): 98.7 (06-28-19 @ 06:43), Max: 99.2 (06-27-19 @ 22:54)  HR: 68 (06-28-19 @ 06:43) (61 - 72)  BP: 155/80 (06-28-19 @ 06:43) (137/58 - 164/72)  BP(mean): --  RR: 16 (06-28-19 @ 06:43) (16 - 18)  SpO2: 95% (06-28-19 @ 06:43) (94% - 98%)      Sedation Score:	[x ] Alert	[ ] Drowsy	[ ] Arousable	[ ] Asleep	[ ] Unresponsive    Side Effects:	[x ] None	[ ] Nausea	[ ] Vomiting	[ ] Pruritus  		[ ] Weakness		[ ] Numbness	[ ] Other:    ASSESSMENT/ PLAN:    Therapy to  be:	[ ] Continue   [ X] Discontinued   [ X] Change to prn Analgesics    Documentation and Verification of current medications:  [ X ] Done	[ ] Not done, not eligible, reason:    Comments: Changed to IV or oral opioid medication at this point.

## 2019-06-28 NOTE — PROGRESS NOTE ADULT - SUBJECTIVE AND OBJECTIVE BOX
Anesthesia Pain Management Service    SUBJECTIVE: Pt doing well with PCEA removed & no problems reported.    Therapy:	  [ ] IV PCA	   [ X] Epidural stopped          [ ] s/p Spinal Opoid              [ ] Postpartum infusion	  [ ] Patient controlled regional anesthesia (PCRA)    [ ] prn Analgesics    Allergies    Bananas (Pruritus)  cefotetan (Hypotension)  Kiwi (Pruritus)  Seafood (Pruritus)  strawberry (Anaphylaxis)    Intolerances      MEDICATIONS  (STANDING):  acetaminophen  IVPB .. 1000 milliGRAM(s) IV Intermittent once  amLODIPine   Tablet 5 milliGRAM(s) Oral daily  dextrose 5% + sodium chloride 0.45%. 1000 milliLiter(s) (50 mL/Hr) IV Continuous <Continuous>  enoxaparin Injectable 40 milliGRAM(s) SubCutaneous daily  lisinopril 20 milliGRAM(s) Oral daily    MEDICATIONS  (PRN):  HYDROmorphone  Injectable 0.5 milliGRAM(s) IV Push every 3 hours PRN Severe Pain (7 - 10)  oxyCODONE    IR 5 milliGRAM(s) Oral every 3 hours PRN Moderate Pain (4 - 6)  oxyCODONE    IR 10 milliGRAM(s) Oral every 3 hours PRN Severe Pain (7 - 10)      OBJECTIVE:   [X] No new signs     [ ] Other:    Side Effects:  [X ] None			[ ] Other:    Assessment of Catheter Site:		[ ] Intact		[ ] Other:    ASSESSMENT/PLAN  [ ] Continue current therapy    [X ] Therapy changed to:    [ ] IV PCA       [ ] Epidural     [ X] prn Analgesics     Comments: Epidural stopped & now to go on oral/IV opioids & adjuvant medication as needed.     Progress Note written now but Patient was seen earlier.

## 2019-06-28 NOTE — PROGRESS NOTE ADULT - ASSESSMENT
Assessment: 68F s/p Open liver biopsy, Omental biopsy, Exploratory laparotomy 6/26    Plan:  - pain control with PCEA  - jimenez catheter in place  - Assess pain during day. If pain well controlled, d/c PCEA and d/c jimenez  - OOB   - CLD  - f/u am labs    D-Team Surgery  [id number] Assessment: 68F s/p Open liver biopsy, Omental biopsy, Exploratory laparotomy 6/26    Plan:  - d/c PCEA  - d/c jimenez  - OOB   - Reg  - d/c planning with 28 days of lovenox tomorrow    D-Team Surgery  [id number]

## 2019-06-28 NOTE — PROGRESS NOTE ADULT - SUBJECTIVE AND OBJECTIVE BOX
SURGERY DAILY PROGRESS NOTE:       Subjective:  Patient seen and examined on AM rounds. Hypertensive with SBP to 160s overnight. Patient refusing oral BP medications (amlodipine) out of fear of abdominal pain. Patient has been complaining of pain overnight, anesthesia contacted by RN as PCEA medication levels were low. Patient given 5mg of IV hydralazine. Denies N/V and tolerating diet.       Objective:    PE:  Gen: NAD  Resp: respirations unlabored, no increased WoB  Abd: soft, ND, NT, no rebound or guarding, dressings c/d/i  : jimenez catheter in place draining yellow urine  Ext: no edema, WWP    Vital Signs Last 24 Hrs  T(C): 37.1 (28 Jun 2019 02:55), Max: 37.3 (27 Jun 2019 22:54)  T(F): 98.8 (28 Jun 2019 02:55), Max: 99.2 (27 Jun 2019 22:54)  HR: 66 (28 Jun 2019 02:55) (54 - 72)  BP: 153/74 (28 Jun 2019 02:55) (128/60 - 164/72)  BP(mean): --  RR: 16 (28 Jun 2019 02:55) (16 - 18)  SpO2: 96% (28 Jun 2019 02:55) (94% - 100%)    06-26-19 @ 07:01  -  06-27-19 @ 07:00  --------------------------------------------------------  IN: 445 mL / OUT: 1990 mL / NET: -1545 mL    06-27-19 @ 07:01 - 06-28-19 @ 05:42  --------------------------------------------------------  IN: 0 mL / OUT: 2900 mL / NET: -2900 mL      MEDICATIONS  (STANDING):  acetaminophen  IVPB .. 1000 milliGRAM(s) IV Intermittent once  amLODIPine   Tablet 5 milliGRAM(s) Oral daily  dextrose 5% + sodium chloride 0.45%. 1000 milliLiter(s) (50 mL/Hr) IV Continuous <Continuous>  HYDROmorphone (10 MICROgram(s)/mL) + BUpivacaine 0.0625% in 0.9% Sodium Chloride PCEA 250 milliLiter(s) Epidural PCA Continuous  lisinopril 20 milliGRAM(s) Oral daily    MEDICATIONS  (PRN):  HYDROmorphone  Injectable 0.5 milliGRAM(s) IV Push every 3 hours PRN Severe breakthrough Pain (7 - 10)  HYDROmorphone (10 MICROgram(s)/mL) + BUpivacaine 0.0625% in 0.9% Sodium Chloride PCEA Rescue Clinician  Bolus 5 milliLiter(s) Epidural every 15 minutes PRN for Pain Score greater than 6  naloxone Injectable 0.1 milliGRAM(s) IV Push every 3 minutes PRN For ANY of the following changes in patient status:  A. RR LESS THAN 10 breaths per minute, B. Oxygen saturation LESS THAN 90%, C. Sedation score of 6  ondansetron Injectable 4 milliGRAM(s) IV Push every 6 hours PRN Nausea      LABS:                        11.0   6.56  )-----------( 203      ( 27 Jun 2019 05:57 )             34.5     06-27    139  |  101  |  9   ----------------------------<  114<H>  4.3   |  23  |  0.57    Ca    8.3<L>      27 Jun 2019 05:57  Phos  3.5     06-27  Mg     1.7     06-27

## 2019-06-29 LAB
ANION GAP SERPL CALC-SCNC: 13 MMO/L — SIGNIFICANT CHANGE UP (ref 7–14)
BUN SERPL-MCNC: 5 MG/DL — LOW (ref 7–23)
CALCIUM SERPL-MCNC: 9 MG/DL — SIGNIFICANT CHANGE UP (ref 8.4–10.5)
CHLORIDE SERPL-SCNC: 103 MMOL/L — SIGNIFICANT CHANGE UP (ref 98–107)
CO2 SERPL-SCNC: 22 MMOL/L — SIGNIFICANT CHANGE UP (ref 22–31)
CREAT SERPL-MCNC: 0.53 MG/DL — SIGNIFICANT CHANGE UP (ref 0.5–1.3)
GLUCOSE SERPL-MCNC: 117 MG/DL — HIGH (ref 70–99)
HCT VFR BLD CALC: 39 % — SIGNIFICANT CHANGE UP (ref 34.5–45)
HGB BLD-MCNC: 12.4 G/DL — SIGNIFICANT CHANGE UP (ref 11.5–15.5)
MAGNESIUM SERPL-MCNC: 2.2 MG/DL — SIGNIFICANT CHANGE UP (ref 1.6–2.6)
MCHC RBC-ENTMCNC: 25.7 PG — LOW (ref 27–34)
MCHC RBC-ENTMCNC: 31.8 % — LOW (ref 32–36)
MCV RBC AUTO: 80.9 FL — SIGNIFICANT CHANGE UP (ref 80–100)
NRBC # FLD: 0 K/UL — SIGNIFICANT CHANGE UP (ref 0–0)
PHOSPHATE SERPL-MCNC: 1.9 MG/DL — LOW (ref 2.5–4.5)
PLATELET # BLD AUTO: 256 K/UL — SIGNIFICANT CHANGE UP (ref 150–400)
PMV BLD: 11.1 FL — SIGNIFICANT CHANGE UP (ref 7–13)
POTASSIUM SERPL-MCNC: 3.9 MMOL/L — SIGNIFICANT CHANGE UP (ref 3.5–5.3)
POTASSIUM SERPL-SCNC: 3.9 MMOL/L — SIGNIFICANT CHANGE UP (ref 3.5–5.3)
RBC # BLD: 4.82 M/UL — SIGNIFICANT CHANGE UP (ref 3.8–5.2)
RBC # FLD: 13.9 % — SIGNIFICANT CHANGE UP (ref 10.3–14.5)
SODIUM SERPL-SCNC: 138 MMOL/L — SIGNIFICANT CHANGE UP (ref 135–145)
WBC # BLD: 6.03 K/UL — SIGNIFICANT CHANGE UP (ref 3.8–10.5)
WBC # FLD AUTO: 6.03 K/UL — SIGNIFICANT CHANGE UP (ref 3.8–10.5)

## 2019-06-29 RX ORDER — ACETAMINOPHEN 500 MG
1000 TABLET ORAL ONCE
Refills: 0 | Status: COMPLETED | OUTPATIENT
Start: 2019-06-29 | End: 2019-06-30

## 2019-06-29 RX ORDER — ACETAMINOPHEN 500 MG
1000 TABLET ORAL ONCE
Refills: 0 | Status: COMPLETED | OUTPATIENT
Start: 2019-06-30 | End: 2019-06-30

## 2019-06-29 RX ORDER — ACETAMINOPHEN 500 MG
1000 TABLET ORAL ONCE
Refills: 0 | Status: COMPLETED | OUTPATIENT
Start: 2019-06-29 | End: 2019-06-29

## 2019-06-29 RX ORDER — POTASSIUM PHOSPHATE, MONOBASIC POTASSIUM PHOSPHATE, DIBASIC 236; 224 MG/ML; MG/ML
15 INJECTION, SOLUTION INTRAVENOUS ONCE
Refills: 0 | Status: COMPLETED | OUTPATIENT
Start: 2019-06-29 | End: 2019-06-29

## 2019-06-29 RX ORDER — TRAMADOL HYDROCHLORIDE 50 MG/1
25 TABLET ORAL ONCE
Refills: 0 | Status: DISCONTINUED | OUTPATIENT
Start: 2019-06-29 | End: 2019-07-09

## 2019-06-29 RX ADMIN — ENOXAPARIN SODIUM 40 MILLIGRAM(S): 100 INJECTION SUBCUTANEOUS at 11:46

## 2019-06-29 RX ADMIN — Medication 400 MILLIGRAM(S): at 11:47

## 2019-06-29 RX ADMIN — Medication 1000 MILLIGRAM(S): at 12:20

## 2019-06-29 RX ADMIN — AMLODIPINE BESYLATE 5 MILLIGRAM(S): 2.5 TABLET ORAL at 09:49

## 2019-06-29 RX ADMIN — LISINOPRIL 20 MILLIGRAM(S): 2.5 TABLET ORAL at 09:49

## 2019-06-29 RX ADMIN — POTASSIUM PHOSPHATE, MONOBASIC POTASSIUM PHOSPHATE, DIBASIC 62.5 MILLIMOLE(S): 236; 224 INJECTION, SOLUTION INTRAVENOUS at 16:44

## 2019-06-29 RX ADMIN — Medication 400 MILLIGRAM(S): at 19:00

## 2019-06-29 RX ADMIN — Medication 63.75 MILLIMOLE(S): at 11:46

## 2019-06-29 NOTE — PROGRESS NOTE ADULT - ASSESSMENT
Assessment: 68F s/p Open liver biopsy, Omental biopsy, Exploratory laparotomy 6/26    Plan:  - d/c PCEA  - d/c tony, f/u TOV  - OOB   - Reg  - d/c planning with 28 days of lovenox tomorrow    D-Team Surgery  [id number]

## 2019-06-29 NOTE — PROGRESS NOTE ADULT - SUBJECTIVE AND OBJECTIVE BOX
Surgery Progress Note    SUBJECTIVE: Pt seen and examined at bedside. Patient in no-apparent distress. PCEA d/c this AM. Patient refusing oral medications in fear of having abdominal pain while not eating, but refuses to eat because of abdominal pain. Extensive discussion had on pain management, patient still refusing oral medications. Radha d/c, f/u TOV. No nausea, vomiting, or diarrhea. -Gas/-BM. Tolerating liquid diet.    Vital Signs Last 24 Hrs  T(C): 36.8 (29 Jun 2019 08:09), Max: 37.1 (28 Jun 2019 20:31)  T(F): 98.2 (29 Jun 2019 08:09), Max: 98.7 (28 Jun 2019 20:31)  HR: 76 (29 Jun 2019 08:09) (65 - 76)  BP: 148/72 (29 Jun 2019 08:09) (148/72 - 170/84)  BP(mean): 91 (29 Jun 2019 08:09) (91 - 91)  RR: 16 (29 Jun 2019 08:09) (15 - 18)  SpO2: 97% (29 Jun 2019 08:09) (95% - 100%)    Physical Exam:  General Appearance: Appears well, NAD  Respiratory: No labored breathing  CV: Pulse regularly present  Abdomen: Soft, nontender, midline incision c/d/i    LABS:                        12.4   6.03  )-----------( 256      ( 29 Jun 2019 05:55 )             39.0     06-29    138  |  103  |  5<L>  ----------------------------<  117<H>  3.9   |  22  |  0.53    Ca    9.0      29 Jun 2019 05:55  Phos  1.9     06-29  Mg     2.2     06-29      PT/INR - ( 28 Jun 2019 07:11 )   PT: 12.6 SEC;   INR: 1.10          PTT - ( 28 Jun 2019 07:11 )  PTT:26.4 SEC      INs and OUTs:    06-28-19 @ 07:01  -  06-29-19 @ 07:00  --------------------------------------------------------  IN: 0 mL / OUT: 500 mL / NET: -500 mL

## 2019-06-30 LAB
ANION GAP SERPL CALC-SCNC: 12 MMO/L — SIGNIFICANT CHANGE UP (ref 7–14)
BUN SERPL-MCNC: 6 MG/DL — LOW (ref 7–23)
CALCIUM SERPL-MCNC: 8.7 MG/DL — SIGNIFICANT CHANGE UP (ref 8.4–10.5)
CHLORIDE SERPL-SCNC: 105 MMOL/L — SIGNIFICANT CHANGE UP (ref 98–107)
CO2 SERPL-SCNC: 21 MMOL/L — LOW (ref 22–31)
CREAT SERPL-MCNC: 0.51 MG/DL — SIGNIFICANT CHANGE UP (ref 0.5–1.3)
GLUCOSE SERPL-MCNC: 124 MG/DL — HIGH (ref 70–99)
HCT VFR BLD CALC: 37.2 % — SIGNIFICANT CHANGE UP (ref 34.5–45)
HGB BLD-MCNC: 11.9 G/DL — SIGNIFICANT CHANGE UP (ref 11.5–15.5)
MAGNESIUM SERPL-MCNC: 2.2 MG/DL — SIGNIFICANT CHANGE UP (ref 1.6–2.6)
MCHC RBC-ENTMCNC: 25.8 PG — LOW (ref 27–34)
MCHC RBC-ENTMCNC: 32 % — SIGNIFICANT CHANGE UP (ref 32–36)
MCV RBC AUTO: 80.7 FL — SIGNIFICANT CHANGE UP (ref 80–100)
NRBC # FLD: 0 K/UL — SIGNIFICANT CHANGE UP (ref 0–0)
PHOSPHATE SERPL-MCNC: 2.5 MG/DL — SIGNIFICANT CHANGE UP (ref 2.5–4.5)
PLATELET # BLD AUTO: 283 K/UL — SIGNIFICANT CHANGE UP (ref 150–400)
PMV BLD: 10.8 FL — SIGNIFICANT CHANGE UP (ref 7–13)
POTASSIUM SERPL-MCNC: 3.8 MMOL/L — SIGNIFICANT CHANGE UP (ref 3.5–5.3)
POTASSIUM SERPL-SCNC: 3.8 MMOL/L — SIGNIFICANT CHANGE UP (ref 3.5–5.3)
RBC # BLD: 4.61 M/UL — SIGNIFICANT CHANGE UP (ref 3.8–5.2)
RBC # FLD: 13.8 % — SIGNIFICANT CHANGE UP (ref 10.3–14.5)
SODIUM SERPL-SCNC: 138 MMOL/L — SIGNIFICANT CHANGE UP (ref 135–145)
WBC # BLD: 5.39 K/UL — SIGNIFICANT CHANGE UP (ref 3.8–10.5)
WBC # FLD AUTO: 5.39 K/UL — SIGNIFICANT CHANGE UP (ref 3.8–10.5)

## 2019-06-30 PROCEDURE — 74019 RADEX ABDOMEN 2 VIEWS: CPT | Mod: 26

## 2019-06-30 RX ORDER — METOCLOPRAMIDE HCL 10 MG
10 TABLET ORAL ONCE
Refills: 0 | Status: COMPLETED | OUTPATIENT
Start: 2019-06-30 | End: 2019-06-30

## 2019-06-30 RX ORDER — SENNA PLUS 8.6 MG/1
1 TABLET ORAL DAILY
Refills: 0 | Status: DISCONTINUED | OUTPATIENT
Start: 2019-06-30 | End: 2019-07-09

## 2019-06-30 RX ORDER — DOCUSATE SODIUM 100 MG
100 CAPSULE ORAL DAILY
Refills: 0 | Status: DISCONTINUED | OUTPATIENT
Start: 2019-06-30 | End: 2019-07-09

## 2019-06-30 RX ORDER — ONDANSETRON 8 MG/1
4 TABLET, FILM COATED ORAL ONCE
Refills: 0 | Status: COMPLETED | OUTPATIENT
Start: 2019-06-30 | End: 2019-06-30

## 2019-06-30 RX ADMIN — Medication 400 MILLIGRAM(S): at 15:04

## 2019-06-30 RX ADMIN — LISINOPRIL 20 MILLIGRAM(S): 2.5 TABLET ORAL at 06:57

## 2019-06-30 RX ADMIN — AMLODIPINE BESYLATE 5 MILLIGRAM(S): 2.5 TABLET ORAL at 06:57

## 2019-06-30 RX ADMIN — Medication 400 MILLIGRAM(S): at 07:04

## 2019-06-30 RX ADMIN — Medication 10 MILLIGRAM(S): at 22:04

## 2019-06-30 RX ADMIN — Medication 1000 MILLIGRAM(S): at 15:34

## 2019-06-30 RX ADMIN — Medication 400 MILLIGRAM(S): at 01:16

## 2019-06-30 RX ADMIN — ONDANSETRON 4 MILLIGRAM(S): 8 TABLET, FILM COATED ORAL at 18:45

## 2019-06-30 RX ADMIN — ENOXAPARIN SODIUM 40 MILLIGRAM(S): 100 INJECTION SUBCUTANEOUS at 15:07

## 2019-06-30 RX ADMIN — Medication 1000 MILLIGRAM(S): at 01:31

## 2019-06-30 NOTE — PROGRESS NOTE ADULT - SUBJECTIVE AND OBJECTIVE BOX
Surgery Progress Note     Subjective/24hour Events:   Patient seen and examined.   No acute events overnight.   Pain controlled off IV dilaudid.   Remains afebrile, hemodynamically stable.   No flatus, no BM.   No nausea or vomiting.     Vital Signs:  Vital Signs Last 24 Hrs  T(C): 36.9 (29 Jun 2019 23:41), Max: 37.1 (29 Jun 2019 06:04)  T(F): 98.4 (29 Jun 2019 23:41), Max: 98.7 (29 Jun 2019 06:04)  HR: 68 (29 Jun 2019 23:41) (68 - 85)  BP: 160/76 (29 Jun 2019 23:41) (102/85 - 168/74)  BP(mean): 91 (29 Jun 2019 08:09) (91 - 91)  RR: 18 (29 Jun 2019 23:41) (15 - 18)  SpO2: 94% (29 Jun 2019 23:41) (94% - 100%)    CAPILLARY BLOOD GLUCOSE          I&O's Detail    28 Jun 2019 07:01  -  29 Jun 2019 07:00  --------------------------------------------------------  IN:  Total IN: 0 mL    OUT:    Voided: 500 mL  Total OUT: 500 mL    Total NET: -500 mL          MEDICATIONS  (STANDING):  acetaminophen  IVPB .. 1000 milliGRAM(s) IV Intermittent once  acetaminophen  IVPB .. 1000 milliGRAM(s) IV Intermittent once  acetaminophen  IVPB .. 1000 milliGRAM(s) IV Intermittent once  amLODIPine   Tablet 5 milliGRAM(s) Oral daily  dextrose 5% + sodium chloride 0.45%. 1000 milliLiter(s) (50 mL/Hr) IV Continuous <Continuous>  enoxaparin Injectable 40 milliGRAM(s) SubCutaneous daily  lisinopril 20 milliGRAM(s) Oral daily  traMADol 25 milliGRAM(s) Oral once    MEDICATIONS  (PRN):  oxyCODONE    IR 5 milliGRAM(s) Oral every 3 hours PRN Moderate Pain (4 - 6)  oxyCODONE    IR 10 milliGRAM(s) Oral every 3 hours PRN Severe Pain (7 - 10)      Physical Exam:  General Appearance: Appears well, NAD  Respiratory: No labored breathing  CV: Pulse regularly present  Abdomen: Soft, nontender, midline incision c/d/i  Labs:    06-29    138  |  103  |  5<L>  ----------------------------<  117<H>  3.9   |  22  |  0.53    Ca    9.0      29 Jun 2019 05:55  Phos  1.9     06-29  Mg     2.2     06-29                              12.4   6.03  )-----------( 256      ( 29 Jun 2019 05:55 )             39.0     PT/INR - ( 28 Jun 2019 07:11 )   PT: 12.6 SEC;   INR: 1.10          PTT - ( 28 Jun 2019 07:11 )  PTT:26.4 SEC Surgery Progress Note     Subjective/24hour Events:   Patient seen and examined.   No acute events overnight.   Pain controlled off IV dilaudid.   Remains afebrile, hemodynamically stable.   No flatus, no BM.   Positive nausea no vomiting.     Vital Signs:  Vital Signs Last 24 Hrs  T(C): 36.8 (01 Jul 2019 15:55), Max: 37.1 (30 Jun 2019 23:58)  T(F): 98.3 (01 Jul 2019 15:55), Max: 98.8 (01 Jul 2019 05:07)  HR: 77 (01 Jul 2019 15:55) (73 - 87)  BP: 148/68 (01 Jul 2019 15:55) (143/79 - 156/74)  BP(mean): --  RR: 18 (01 Jul 2019 15:55) (16 - 18)  SpO2: 100% (01 Jul 2019 15:55) (91% - 100%)    CAPILLARY BLOOD GLUCOSE      I&O's Detail    30 Jun 2019 07:01  -  01 Jul 2019 07:00  --------------------------------------------------------  IN:    dextrose 5% + sodium chloride 0.45%.: 500 mL    Oral Fluid: 240 mL  Total IN: 740 mL    OUT:    Voided: 550 mL  Total OUT: 550 mL    Total NET: 190 mL      01 Jul 2019 07:01  -  01 Jul 2019 17:42  --------------------------------------------------------  IN:  Total IN: 0 mL    OUT:    Voided: 400 mL  Total OUT: 400 mL    Total NET: -400 mL              MEDICATIONS  (STANDING):  acetaminophen  IVPB .. 1000 milliGRAM(s) IV Intermittent once  acetaminophen  IVPB .. 1000 milliGRAM(s) IV Intermittent once  acetaminophen  IVPB .. 1000 milliGRAM(s) IV Intermittent once  amLODIPine   Tablet 5 milliGRAM(s) Oral daily  dextrose 5% + sodium chloride 0.45%. 1000 milliLiter(s) (50 mL/Hr) IV Continuous <Continuous>  enoxaparin Injectable 40 milliGRAM(s) SubCutaneous daily  lisinopril 20 milliGRAM(s) Oral daily  traMADol 25 milliGRAM(s) Oral once    MEDICATIONS  (PRN):  oxyCODONE    IR 5 milliGRAM(s) Oral every 3 hours PRN Moderate Pain (4 - 6)  oxyCODONE    IR 10 milliGRAM(s) Oral every 3 hours PRN Severe Pain (7 - 10)      Physical Exam:  General Appearance: Appears well, NAD  Respiratory: No labored breathing  CV: Pulse regularly present  Abdomen: Soft, nontender, midline incision c/d/i    Labs:  07-01    136  |  104  |  8   ----------------------------<  122<H>  3.9   |  20<L>  |  0.49<L>    Ca    8.4      01 Jul 2019 05:56  Phos  1.7     07-01  Mg     2.0     07-01                                        11.9   5.39  )-----------( 283      ( 30 Jun 2019 05:48 )             37.2

## 2019-06-30 NOTE — PROGRESS NOTE ADULT - ASSESSMENT
Assessment: 68F s/p Open liver biopsy, Omental biopsy, Exploratory laparotomy 6/26    Plan:  - Pain control with PO pain meds.   - OOB   - Regular diet as tolerated.   - d/c planning with 28 days of lovenox.     D Team Surgical Oncology Pager #[phone]

## 2019-07-01 LAB
ANION GAP SERPL CALC-SCNC: 12 MMO/L — SIGNIFICANT CHANGE UP (ref 7–14)
BUN SERPL-MCNC: 8 MG/DL — SIGNIFICANT CHANGE UP (ref 7–23)
CALCIUM SERPL-MCNC: 8.4 MG/DL — SIGNIFICANT CHANGE UP (ref 8.4–10.5)
CHLORIDE SERPL-SCNC: 104 MMOL/L — SIGNIFICANT CHANGE UP (ref 98–107)
CO2 SERPL-SCNC: 20 MMOL/L — LOW (ref 22–31)
CREAT SERPL-MCNC: 0.49 MG/DL — LOW (ref 0.5–1.3)
GLUCOSE SERPL-MCNC: 122 MG/DL — HIGH (ref 70–99)
MAGNESIUM SERPL-MCNC: 2 MG/DL — SIGNIFICANT CHANGE UP (ref 1.6–2.6)
PHOSPHATE SERPL-MCNC: 1.7 MG/DL — LOW (ref 2.5–4.5)
POTASSIUM SERPL-MCNC: 3.9 MMOL/L — SIGNIFICANT CHANGE UP (ref 3.5–5.3)
POTASSIUM SERPL-SCNC: 3.9 MMOL/L — SIGNIFICANT CHANGE UP (ref 3.5–5.3)
SODIUM SERPL-SCNC: 136 MMOL/L — SIGNIFICANT CHANGE UP (ref 135–145)

## 2019-07-01 RX ORDER — ONDANSETRON 8 MG/1
4 TABLET, FILM COATED ORAL ONCE
Refills: 0 | Status: COMPLETED | OUTPATIENT
Start: 2019-07-01 | End: 2019-07-01

## 2019-07-01 RX ORDER — ACETAMINOPHEN 500 MG
1000 TABLET ORAL ONCE
Refills: 0 | Status: COMPLETED | OUTPATIENT
Start: 2019-07-01 | End: 2019-07-01

## 2019-07-01 RX ORDER — PANTOPRAZOLE SODIUM 20 MG/1
40 TABLET, DELAYED RELEASE ORAL
Refills: 0 | Status: DISCONTINUED | OUTPATIENT
Start: 2019-07-01 | End: 2019-07-09

## 2019-07-01 RX ADMIN — ENOXAPARIN SODIUM 40 MILLIGRAM(S): 100 INJECTION SUBCUTANEOUS at 15:08

## 2019-07-01 RX ADMIN — AMLODIPINE BESYLATE 5 MILLIGRAM(S): 2.5 TABLET ORAL at 05:09

## 2019-07-01 RX ADMIN — ONDANSETRON 4 MILLIGRAM(S): 8 TABLET, FILM COATED ORAL at 18:30

## 2019-07-01 RX ADMIN — LISINOPRIL 20 MILLIGRAM(S): 2.5 TABLET ORAL at 05:09

## 2019-07-01 RX ADMIN — PANTOPRAZOLE SODIUM 40 MILLIGRAM(S): 20 TABLET, DELAYED RELEASE ORAL at 07:01

## 2019-07-01 RX ADMIN — Medication 85 MILLIMOLE(S): at 11:11

## 2019-07-01 RX ADMIN — Medication 1000 MILLIGRAM(S): at 07:15

## 2019-07-01 RX ADMIN — Medication 400 MILLIGRAM(S): at 07:00

## 2019-07-01 NOTE — PROGRESS NOTE ADULT - SUBJECTIVE AND OBJECTIVE BOX
Surgery Progress Note     Subjective/24hour Events:   Patient seen and examined.   No acute events overnight.   Pain controlled off IV dilaudid.   Remains afebrile, hemodynamically stable.   No flatus, no BM.   Positive nausea no vomiting.     Vital Signs:  Vital Signs Last 24 Hrs  T(C): 36.8 (01 Jul 2019 15:55), Max: 37.1 (30 Jun 2019 23:58)  T(F): 98.3 (01 Jul 2019 15:55), Max: 98.8 (01 Jul 2019 05:07)  HR: 77 (01 Jul 2019 15:55) (73 - 87)  BP: 148/68 (01 Jul 2019 15:55) (143/79 - 156/74)  BP(mean): --  RR: 18 (01 Jul 2019 15:55) (16 - 18)  SpO2: 100% (01 Jul 2019 15:55) (91% - 100%)    CAPILLARY BLOOD GLUCOSE      I&O's Detail    30 Jun 2019 07:01  -  01 Jul 2019 07:00  --------------------------------------------------------  IN:    dextrose 5% + sodium chloride 0.45%.: 500 mL    Oral Fluid: 240 mL  Total IN: 740 mL    OUT:    Voided: 550 mL  Total OUT: 550 mL    Total NET: 190 mL      01 Jul 2019 07:01  -  01 Jul 2019 17:42  --------------------------------------------------------  IN:  Total IN: 0 mL    OUT:    Voided: 400 mL  Total OUT: 400 mL    Total NET: -400 mL              MEDICATIONS  (STANDING):  acetaminophen  IVPB .. 1000 milliGRAM(s) IV Intermittent once  acetaminophen  IVPB .. 1000 milliGRAM(s) IV Intermittent once  acetaminophen  IVPB .. 1000 milliGRAM(s) IV Intermittent once  amLODIPine   Tablet 5 milliGRAM(s) Oral daily  dextrose 5% + sodium chloride 0.45%. 1000 milliLiter(s) (50 mL/Hr) IV Continuous <Continuous>  enoxaparin Injectable 40 milliGRAM(s) SubCutaneous daily  lisinopril 20 milliGRAM(s) Oral daily  traMADol 25 milliGRAM(s) Oral once    MEDICATIONS  (PRN):  oxyCODONE    IR 5 milliGRAM(s) Oral every 3 hours PRN Moderate Pain (4 - 6)  oxyCODONE    IR 10 milliGRAM(s) Oral every 3 hours PRN Severe Pain (7 - 10)      Physical Exam:  General Appearance: Appears well, NAD  Respiratory: No labored breathing  CV: Pulse regularly present  Abdomen: Soft, nontender, midline incision c/d/i    Labs:  07-01    136  |  104  |  8   ----------------------------<  122<H>  3.9   |  20<L>  |  0.49<L>    Ca    8.4      01 Jul 2019 05:56  Phos  1.7     07-01  Mg     2.0     07-01                                        11.9   5.39  )-----------( 283      ( 30 Jun 2019 05:48 )             37.2

## 2019-07-01 NOTE — PROGRESS NOTE ADULT - ASSESSMENT
Assessment: 68F s/p Open liver biopsy, Omental biopsy, Exploratory laparotomy 6/26    Plan:  - Pain control with PO pain meds.   - OOB   - Regular diet as tolerated.   - d/c planning with 28 days of lovenox.     D Team Surgical Oncology Pager #[phone]

## 2019-07-02 LAB
ANION GAP SERPL CALC-SCNC: 16 MMO/L — HIGH (ref 7–14)
BUN SERPL-MCNC: 7 MG/DL — SIGNIFICANT CHANGE UP (ref 7–23)
CALCIUM SERPL-MCNC: 8.3 MG/DL — LOW (ref 8.4–10.5)
CHLORIDE SERPL-SCNC: 100 MMOL/L — SIGNIFICANT CHANGE UP (ref 98–107)
CO2 SERPL-SCNC: 20 MMOL/L — LOW (ref 22–31)
CREAT SERPL-MCNC: 0.49 MG/DL — LOW (ref 0.5–1.3)
GLUCOSE SERPL-MCNC: 146 MG/DL — HIGH (ref 70–99)
HCT VFR BLD CALC: 37.8 % — SIGNIFICANT CHANGE UP (ref 34.5–45)
HGB BLD-MCNC: 12.3 G/DL — SIGNIFICANT CHANGE UP (ref 11.5–15.5)
MAGNESIUM SERPL-MCNC: 2 MG/DL — SIGNIFICANT CHANGE UP (ref 1.6–2.6)
MCHC RBC-ENTMCNC: 25.6 PG — LOW (ref 27–34)
MCHC RBC-ENTMCNC: 32.5 % — SIGNIFICANT CHANGE UP (ref 32–36)
MCV RBC AUTO: 78.6 FL — LOW (ref 80–100)
NRBC # FLD: 0 K/UL — SIGNIFICANT CHANGE UP (ref 0–0)
PHOSPHATE SERPL-MCNC: 1.8 MG/DL — LOW (ref 2.5–4.5)
PLATELET # BLD AUTO: 307 K/UL — SIGNIFICANT CHANGE UP (ref 150–400)
PMV BLD: 10.4 FL — SIGNIFICANT CHANGE UP (ref 7–13)
POTASSIUM SERPL-MCNC: 3.3 MMOL/L — LOW (ref 3.5–5.3)
POTASSIUM SERPL-SCNC: 3.3 MMOL/L — LOW (ref 3.5–5.3)
RBC # BLD: 4.81 M/UL — SIGNIFICANT CHANGE UP (ref 3.8–5.2)
RBC # FLD: 13.6 % — SIGNIFICANT CHANGE UP (ref 10.3–14.5)
SODIUM SERPL-SCNC: 136 MMOL/L — SIGNIFICANT CHANGE UP (ref 135–145)
WBC # BLD: 5.69 K/UL — SIGNIFICANT CHANGE UP (ref 3.8–10.5)
WBC # FLD AUTO: 5.69 K/UL — SIGNIFICANT CHANGE UP (ref 3.8–10.5)

## 2019-07-02 PROCEDURE — 74177 CT ABD & PELVIS W/CONTRAST: CPT | Mod: 26

## 2019-07-02 RX ORDER — POTASSIUM PHOSPHATE, MONOBASIC POTASSIUM PHOSPHATE, DIBASIC 236; 224 MG/ML; MG/ML
15 INJECTION, SOLUTION INTRAVENOUS ONCE
Refills: 0 | Status: COMPLETED | OUTPATIENT
Start: 2019-07-02 | End: 2019-07-02

## 2019-07-02 RX ORDER — ACETAMINOPHEN 500 MG
1000 TABLET ORAL ONCE
Refills: 0 | Status: COMPLETED | OUTPATIENT
Start: 2019-07-02 | End: 2019-07-02

## 2019-07-02 RX ORDER — MORPHINE SULFATE 50 MG/1
2 CAPSULE, EXTENDED RELEASE ORAL EVERY 6 HOURS
Refills: 0 | Status: DISCONTINUED | OUTPATIENT
Start: 2019-07-02 | End: 2019-07-09

## 2019-07-02 RX ORDER — POTASSIUM CHLORIDE 20 MEQ
10 PACKET (EA) ORAL
Refills: 0 | Status: COMPLETED | OUTPATIENT
Start: 2019-07-02 | End: 2019-07-02

## 2019-07-02 RX ORDER — ONDANSETRON 8 MG/1
4 TABLET, FILM COATED ORAL EVERY 6 HOURS
Refills: 0 | Status: DISCONTINUED | OUTPATIENT
Start: 2019-07-02 | End: 2019-07-02

## 2019-07-02 RX ORDER — POTASSIUM CHLORIDE 20 MEQ
40 PACKET (EA) ORAL ONCE
Refills: 0 | Status: COMPLETED | OUTPATIENT
Start: 2019-07-02 | End: 2019-07-02

## 2019-07-02 RX ADMIN — Medication 100 MILLIEQUIVALENT(S): at 15:08

## 2019-07-02 RX ADMIN — Medication 100 MILLIEQUIVALENT(S): at 16:08

## 2019-07-02 RX ADMIN — ENOXAPARIN SODIUM 40 MILLIGRAM(S): 100 INJECTION SUBCUTANEOUS at 13:19

## 2019-07-02 RX ADMIN — POTASSIUM PHOSPHATE, MONOBASIC POTASSIUM PHOSPHATE, DIBASIC 62.5 MILLIMOLE(S): 236; 224 INJECTION, SOLUTION INTRAVENOUS at 10:30

## 2019-07-02 RX ADMIN — Medication 100 MILLIEQUIVALENT(S): at 14:25

## 2019-07-02 RX ADMIN — SODIUM CHLORIDE 75 MILLILITER(S): 9 INJECTION, SOLUTION INTRAVENOUS at 10:30

## 2019-07-02 RX ADMIN — ONDANSETRON 4 MILLIGRAM(S): 8 TABLET, FILM COATED ORAL at 04:00

## 2019-07-02 RX ADMIN — PANTOPRAZOLE SODIUM 40 MILLIGRAM(S): 20 TABLET, DELAYED RELEASE ORAL at 06:05

## 2019-07-02 RX ADMIN — Medication 400 MILLIGRAM(S): at 01:48

## 2019-07-02 RX ADMIN — ONDANSETRON 4 MILLIGRAM(S): 8 TABLET, FILM COATED ORAL at 13:19

## 2019-07-02 RX ADMIN — SODIUM CHLORIDE 75 MILLILITER(S): 9 INJECTION, SOLUTION INTRAVENOUS at 06:06

## 2019-07-02 RX ADMIN — Medication 1000 MILLIGRAM(S): at 02:18

## 2019-07-02 NOTE — PROGRESS NOTE ADULT - ATTENDING COMMENTS
pt seen on 7/2    Diet as von  Discussed need for Med Onc f/u  CT A/P given ongoing abdominal pain
DC planning once von po.    If fails po -- will do repeat CT AP    D/w pt intraop findings again.  Re-rec'd f/u w clnical trial at [facility] [place] (Dr. Tolbert had been arranging)    Pt seen on 7/1
d/w pt intraop findings.  Adv to reg diet as von
DC planning once von po.    If fails po -- will do repeat CT AP    D/w pt intraop findings again.  [place] [facility] [place] Tolbert [place]    Pt seen on 7/1

## 2019-07-02 NOTE — PROGRESS NOTE ADULT - SUBJECTIVE AND OBJECTIVE BOX
Requested for followup pain consult on this patient for pain management.    HPI: 68yFemaleMalignant neoplasm of connective and soft tissue  No pertinent family history in first degree relatives  Handoff  MEWS Score  Anemia  Thyroid nodule  Stomach cancer  Obese  RA (rheumatoid arthritis)  Stomach neoplasm  Hypertension  Anemia  Peritoneal carcinomatosis  Malignant GIST (gastrointestinal stromal tumor)  Malignant gastrointestinal stromal tumor (GIST) of stomach  Malignant GIST (gastrointestinal stromal tumor)  Open liver biopsy  Omental biopsy  Exploratory laparotomy  H/O laparoscopy  H/O myomectomy  No significant past surgical history  H/O laparoscopy  No significant past surgical history      acetaminophen  IVPB .. 1000 milliGRAM(s) IV Intermittent once  amLODIPine   Tablet 5 milliGRAM(s) Oral daily  dextrose 5% + sodium chloride 0.45%. 1000 milliLiter(s) IV Continuous <Continuous>  docusate sodium 100 milliGRAM(s) Oral daily  enoxaparin Injectable 40 milliGRAM(s) SubCutaneous daily  lisinopril 20 milliGRAM(s) Oral daily  ondansetron Injectable 4 milliGRAM(s) IV Push every 6 hours PRN  oxyCODONE    IR 5 milliGRAM(s) Oral every 3 hours PRN  oxyCODONE    IR 10 milliGRAM(s) Oral every 3 hours PRN  pantoprazole    Tablet 40 milliGRAM(s) Oral before breakfast  potassium chloride  10 mEq/100 mL IVPB 10 milliEquivalent(s) IV Intermittent every 1 hour  senna 1 Tablet(s) Oral daily  traMADol 25 milliGRAM(s) Oral once      Bananas (Pruritus)  cefotetan (Hypotension)  Kiwi (Pruritus)  Seafood (Pruritus)  strawberry (Anaphylaxis)      ondansetron Injectable: [Ordered as ZOFRAN Injectable]  4 milliGRAM(s), IV Push, once, Stop After 1 Doses  Administration Instructions: Max IV Push dose 8 milliGRAM(s)  IF IV PUSH, ADMINISTER OVER 2-5 MIN (07-01-19 @ 15:48)  Complete Blood Count: AM Sched. Collection: 02-Jul-2019 06:00 (07-01-19 @ 16:12)  Basic Metabolic Panel w/Mg & Inorg Phos: AM Sched. Collection: 02-Jul-2019 06:00 (07-01-19 @ 16:12)  Chart Check (07-01-19 @ 18:55)  Chart Check (07-01-19 @ 20:09)  acetaminophen  IVPB ..: [Known as OFIRMEV.]  1000 milliGRAM(s) in IV Solution 100 milliLiter(s), IV Intermittent, once, infuse over 15 Minute(s), Stop After 1 Doses  Administration Instructions: MAX DAILY DOSE:  ADULT = 4,000 mG/Day (07-02-19 @ 01:25)  ondansetron Injectable: [Ordered as ZOFRAN Injectable]  4 milliGRAM(s), IV Push, every 6 hours, PRN for Nausea and/or Vomiting  Administration Instructions: Max IV Push dose 8 milliGRAM(s)  IF IV PUSH, ADMINISTER OVER 2-5 MIN (07-02-19 @ 03:54)  potassium phosphate IVPB:   15 milliMole(s) in sodium chloride 0.9% 250 milliLiter(s), IV Intermittent, once, infuse over 4 Hour(s), Stop After 1 Doses  Provider's Contact #: [phone] (07-02-19 @ 08:17)  potassium chloride   Powder: [Ordered as KLOR-CON POWDER]  40 milliEquivalent(s), Oral, once, Stop After 1 Doses  Administration Instructions: Dissolve contents of 1 packet in at least 4 oz. of water or other beverage.  Provider's Contact #: [phone] (07-02-19 @ 08:17)  CT Abdomen and Pelvis w/ Oral Cont and w/ IV Cont: Urgent   Indication: eval for abscess/obstruction  Transport: [facility]  Provider's Contact #: [phone] (07-02-19 @ 08:55)  potassium chloride  10 mEq/100 mL IVPB:   10 milliEquivalent(s) in IV Solution 100 milliLiter(s), IV Intermittent, every 1 hour, infuse over 60 Minute(s), Stop After 3 Doses  Provider's Contact #: [phone] (07-02-19 @ 10:39)    Summary: Patient seen at bedside.  Patient adamantly refuses to take Oxycodone IR because she heard and seen lots of negative things happening to people taking that medication.  However, patient is not opposed to getting IV Morphine for pain.  Patient also refuses to take any pain medications by mouth because she is very nauseous and is not eating much.  Patient states that her pain is 6/10, near her surgical site on her abdomen.  Patient's main concern is that when she gets pain, her nausea worsens.  Patient claims that the antiemetic medications she is receiving is not helping.      PHYSICAL EXAM:  GENERAL: Alert & Oriented x 3 in NAD, well-groomed, well-developed     Impression/Plan: Requested by D Team to help manage pain.   Recommendations:  1) Continue IV Tylenol  2) Consider adding IV Toradol  3) Consider discontinuing Oxycodone orders and add IV Morphine 2mg every 4 hours prn severe pain.  Hold for oversedation.  Only one dose in any 4 hours.  4) Consider adding Lidocaine patch near surgical site 12 hours on and 12 hours off.  5) Recommend Switch antiemetics.  6) Recommend Holistic RN consult.  7) Recommend Palliative Consult.    Patient was seen 07-02-19 @ 11:46

## 2019-07-02 NOTE — PROGRESS NOTE ADULT - SUBJECTIVE AND OBJECTIVE BOX
Surgery Progress Note     Subjective/24hour Events:   Patient seen and examined.   No acute events overnight.     Vital Signs:  T(C): 36.9 (07-02-19 @ 12:08), Max: 37.3 (07-02-19 @ 00:02)  HR: 71 (07-02-19 @ 12:08) (71 - 86)  BP: 155/67 (07-02-19 @ 12:08) (143/79 - 155/67)  RR: 18 (07-02-19 @ 12:08) (18 - 18)  SpO2: 100% (07-02-19 @ 12:08) (95% - 100%)      07-01 @ 07:01  -  07-02 @ 07:00  --------------------------------------------------------  IN:    dextrose 5% + sodium chloride 0.45%.: 900 mL    IV PiggyBack: 100 mL  Total IN: 1000 mL    OUT:    Voided: 700 mL  Total OUT: 700 mL    Total NET: 300 mL    Physical Exam:  General Appearance: Appears well, NAD  Respiratory: No labored breathing  CV: Pulse regularly present  Abdomen: Soft, nontender, midline incision c/d/i    Labs:  CBC (07-02 @ 05:57)                              12.3                           5.69    )----------------(  307        --    % Neutrophils, --    % Lymphocytes, ANC: --                                  37.8      BMP (07-02 @ 05:57)             136     |  100     |  7     		Ca++ --      Ca 8.3<L>             ---------------------------------( 146<H>		Mg 2.0                3.3<L>  |  20<L>   |  0.49<L>			Ph 1.8<L>  BMP (07-01 @ 05:56)             136     |  104     |  8     		Ca++ --      Ca 8.4                ---------------------------------( 122<H>		Mg 2.0                3.9     |  20<L>   |  0.49<L>			Ph 1.7<L>        Assessment and Plan:   · Assessment		  Assessment: 68F s/p Open liver biopsy, Omental biopsy, Exploratory laparotomy 6/26    Plan:  - Pain consult called for pain control  - OOB   - Regular diet as tolerated.   - d/c planning with 28 days of lovenox.     D Team Surgical Oncology Pager #[phone]

## 2019-07-03 LAB
ANION GAP SERPL CALC-SCNC: 15 MMO/L — HIGH (ref 7–14)
BUN SERPL-MCNC: 5 MG/DL — LOW (ref 7–23)
CALCIUM SERPL-MCNC: 8 MG/DL — LOW (ref 8.4–10.5)
CHLORIDE SERPL-SCNC: 100 MMOL/L — SIGNIFICANT CHANGE UP (ref 98–107)
CO2 SERPL-SCNC: 20 MMOL/L — LOW (ref 22–31)
CREAT SERPL-MCNC: 0.47 MG/DL — LOW (ref 0.5–1.3)
GLUCOSE SERPL-MCNC: 169 MG/DL — HIGH (ref 70–99)
HCT VFR BLD CALC: 38.1 % — SIGNIFICANT CHANGE UP (ref 34.5–45)
HGB BLD-MCNC: 12.3 G/DL — SIGNIFICANT CHANGE UP (ref 11.5–15.5)
MAGNESIUM SERPL-MCNC: 2 MG/DL — SIGNIFICANT CHANGE UP (ref 1.6–2.6)
MCHC RBC-ENTMCNC: 25.9 PG — LOW (ref 27–34)
MCHC RBC-ENTMCNC: 32.3 % — SIGNIFICANT CHANGE UP (ref 32–36)
MCV RBC AUTO: 80.4 FL — SIGNIFICANT CHANGE UP (ref 80–100)
NRBC # FLD: 0 K/UL — SIGNIFICANT CHANGE UP (ref 0–0)
PHOSPHATE SERPL-MCNC: 1.4 MG/DL — LOW (ref 2.5–4.5)
PLATELET # BLD AUTO: 309 K/UL — SIGNIFICANT CHANGE UP (ref 150–400)
PMV BLD: 10.4 FL — SIGNIFICANT CHANGE UP (ref 7–13)
POTASSIUM SERPL-MCNC: 3.6 MMOL/L — SIGNIFICANT CHANGE UP (ref 3.5–5.3)
POTASSIUM SERPL-SCNC: 3.6 MMOL/L — SIGNIFICANT CHANGE UP (ref 3.5–5.3)
RBC # BLD: 4.74 M/UL — SIGNIFICANT CHANGE UP (ref 3.8–5.2)
RBC # FLD: 13.7 % — SIGNIFICANT CHANGE UP (ref 10.3–14.5)
SODIUM SERPL-SCNC: 135 MMOL/L — SIGNIFICANT CHANGE UP (ref 135–145)
SURGICAL PATHOLOGY STUDY: SIGNIFICANT CHANGE UP
WBC # BLD: 5.48 K/UL — SIGNIFICANT CHANGE UP (ref 3.8–10.5)
WBC # FLD AUTO: 5.48 K/UL — SIGNIFICANT CHANGE UP (ref 3.8–10.5)

## 2019-07-03 RX ORDER — ONDANSETRON 8 MG/1
4 TABLET, FILM COATED ORAL EVERY 6 HOURS
Refills: 0 | Status: DISCONTINUED | OUTPATIENT
Start: 2019-07-03 | End: 2019-07-09

## 2019-07-03 RX ORDER — METOCLOPRAMIDE HCL 10 MG
10 TABLET ORAL EVERY 8 HOURS
Refills: 0 | Status: DISCONTINUED | OUTPATIENT
Start: 2019-07-03 | End: 2019-07-06

## 2019-07-03 RX ADMIN — SODIUM CHLORIDE 75 MILLILITER(S): 9 INJECTION, SOLUTION INTRAVENOUS at 11:03

## 2019-07-03 RX ADMIN — AMLODIPINE BESYLATE 5 MILLIGRAM(S): 2.5 TABLET ORAL at 11:03

## 2019-07-03 RX ADMIN — Medication 10 MILLIGRAM(S): at 15:19

## 2019-07-03 RX ADMIN — ONDANSETRON 4 MILLIGRAM(S): 8 TABLET, FILM COATED ORAL at 11:03

## 2019-07-03 RX ADMIN — LISINOPRIL 20 MILLIGRAM(S): 2.5 TABLET ORAL at 11:03

## 2019-07-03 RX ADMIN — ENOXAPARIN SODIUM 40 MILLIGRAM(S): 100 INJECTION SUBCUTANEOUS at 11:02

## 2019-07-03 NOTE — PROGRESS NOTE ADULT - SUBJECTIVE AND OBJECTIVE BOX
Team D Surgery Progress Note     SUBJECTIVE / 24H EVENTS  Patient seen and examined on morning rounds. No acute events overnight.    OBJECTIVE:    VITAL SIGNS:  T(C): 36.7 (07-03-19 @ 11:03), Max: 37.1 (07-02-19 @ 22:28)  HR: 83 (07-03-19 @ 11:03) (70 - 83)  BP: 157/83 (07-03-19 @ 11:03) (147/69 - 157/83)  RR: 14 (07-03-19 @ 11:03) (14 - 18)  SpO2: 100% (07-03-19 @ 11:03) (95% - 100%)  Daily     Daily       Physical Exam:  General Appearance: Appears well, NAD  Respiratory: No labored breathing  CV: Pulse regularly present  Abdomen: Soft, nontender, midline incision c/d/i      07-02-19 @ 07:01  -  07-03-19 @ 07:00  --------------------------------------------------------  IN:  Total IN: 0 mL    OUT:    Voided: 700 mL  Total OUT: 700 mL    Total NET: -700 mL      07-03-19 @ 07:01  -  07-03-19 @ 13:34  --------------------------------------------------------  IN:  Total IN: 0 mL    OUT:    Voided: 500 mL  Total OUT: 500 mL    Total NET: -500 mL          LAB VALUES:  07-03    135  |  100  |  5<L>  ----------------------------<  169<H>  3.6   |  20<L>  |  0.47<L>    Ca    8.0<L>      03 Jul 2019 05:27  Phos  1.4     07-03  Mg     2.0     07-03                                 12.3   5.48  )-----------( 309      ( 03 Jul 2019 05:27 )             38.1                   MICROBIOLOGY:    No new microbiology data for review.     RADIOLOGY:  PACS Image: Image(s) Available (07-02-19 @ 21:08)    No new radiographic images for review.    MEDICATIONS  (STANDING):  acetaminophen  IVPB .. 1000 milliGRAM(s) IV Intermittent once  amLODIPine   Tablet 5 milliGRAM(s) Oral daily  dextrose 5% + sodium chloride 0.45%. 1000 milliLiter(s) (75 mL/Hr) IV Continuous <Continuous>  docusate sodium 100 milliGRAM(s) Oral daily  enoxaparin Injectable 40 milliGRAM(s) SubCutaneous daily  lisinopril 20 milliGRAM(s) Oral daily  pantoprazole    Tablet 40 milliGRAM(s) Oral before breakfast  senna 1 Tablet(s) Oral daily  traMADol 25 milliGRAM(s) Oral once    MEDICATIONS  (PRN):  metoclopramide Injectable 10 milliGRAM(s) IV Push every 8 hours PRN nausea  morphine  - Injectable 2 milliGRAM(s) IV Push every 6 hours PRN Severe Pain (7 - 10)  ondansetron Injectable 4 milliGRAM(s) IV Push every 6 hours PRN Nausea  oxyCODONE    IR 5 milliGRAM(s) Oral every 3 hours PRN Moderate Pain (4 - 6)             Assessment and Plan:   · Assessment		  68F s/p open liver biopsy, omental biopsy, exploratory laparotomy 6/26 for metastatic GIST. CT abdomen     Plan:  - upper GI series today  - c/w pain control  - OOB   - Regular diet as tolerated   - d/c planning with 28 days of lovenox.     D Team Surgical Oncology Pager #[phone] Team D Surgery Progress Note     SUBJECTIVE / 24H EVENTS  Patient seen and examined on morning rounds. No acute events overnight.    OBJECTIVE:    VITAL SIGNS:  T(C): 36.7 (07-03-19 @ 11:03), Max: 37.1 (07-02-19 @ 22:28)  HR: 83 (07-03-19 @ 11:03) (70 - 83)  BP: 157/83 (07-03-19 @ 11:03) (147/69 - 157/83)  RR: 14 (07-03-19 @ 11:03) (14 - 18)  SpO2: 100% (07-03-19 @ 11:03) (95% - 100%)  Daily     Daily       Physical Exam:  General Appearance: Appears well, NAD  Respiratory: No labored breathing  CV: Pulse regularly present  Abdomen: Soft, nontender, midline incision c/d/i      07-02-19 @ 07:01  -  07-03-19 @ 07:00  --------------------------------------------------------  IN:  Total IN: 0 mL    OUT:    Voided: 700 mL  Total OUT: 700 mL    Total NET: -700 mL      07-03-19 @ 07:01  -  07-03-19 @ 13:34  --------------------------------------------------------  IN:  Total IN: 0 mL    OUT:    Voided: 500 mL  Total OUT: 500 mL    Total NET: -500 mL          LAB VALUES:  07-03    135  |  100  |  5<L>  ----------------------------<  169<H>  3.6   |  20<L>  |  0.47<L>    Ca    8.0<L>      03 Jul 2019 05:27  Phos  1.4     07-03  Mg     2.0     07-03                                 12.3   5.48  )-----------( 309      ( 03 Jul 2019 05:27 )             38.1                   MICROBIOLOGY:    No new microbiology data for review.     RADIOLOGY:  PACS Image: Image(s) Available (07-02-19 @ 21:08)    No new radiographic images for review.    MEDICATIONS  (STANDING):  acetaminophen  IVPB .. 1000 milliGRAM(s) IV Intermittent once  amLODIPine   Tablet 5 milliGRAM(s) Oral daily  dextrose 5% + sodium chloride 0.45%. 1000 milliLiter(s) (75 mL/Hr) IV Continuous <Continuous>  docusate sodium 100 milliGRAM(s) Oral daily  enoxaparin Injectable 40 milliGRAM(s) SubCutaneous daily  lisinopril 20 milliGRAM(s) Oral daily  pantoprazole    Tablet 40 milliGRAM(s) Oral before breakfast  senna 1 Tablet(s) Oral daily  traMADol 25 milliGRAM(s) Oral once    MEDICATIONS  (PRN):  metoclopramide Injectable 10 milliGRAM(s) IV Push every 8 hours PRN nausea  morphine  - Injectable 2 milliGRAM(s) IV Push every 6 hours PRN Severe Pain (7 - 10)  ondansetron Injectable 4 milliGRAM(s) IV Push every 6 hours PRN Nausea  oxyCODONE    IR 5 milliGRAM(s) Oral every 3 hours PRN Moderate Pain (4 - 6)             Assessment and Plan:   · Assessment		  68F s/p open liver biopsy, omental biopsy, exploratory laparotomy 6/26 for metastatic GIST. CT abdomen 7/2 showed no bowel obstruction or intra-abdominal collection  Plan:  - upper GI series today  - c/w pain control  - OOB   - Regular diet as tolerated   - d/c planning with 28 days of lovenox.     D Team Surgical Oncology Pager #[phone]

## 2019-07-04 LAB
ANION GAP SERPL CALC-SCNC: 13 MMO/L — SIGNIFICANT CHANGE UP (ref 7–14)
BUN SERPL-MCNC: 7 MG/DL — SIGNIFICANT CHANGE UP (ref 7–23)
CALCIUM SERPL-MCNC: 8.1 MG/DL — LOW (ref 8.4–10.5)
CHLORIDE SERPL-SCNC: 102 MMOL/L — SIGNIFICANT CHANGE UP (ref 98–107)
CO2 SERPL-SCNC: 21 MMOL/L — LOW (ref 22–31)
CREAT SERPL-MCNC: 0.48 MG/DL — LOW (ref 0.5–1.3)
GLUCOSE SERPL-MCNC: 122 MG/DL — HIGH (ref 70–99)
HCT VFR BLD CALC: 38.7 % — SIGNIFICANT CHANGE UP (ref 34.5–45)
HGB BLD-MCNC: 12.4 G/DL — SIGNIFICANT CHANGE UP (ref 11.5–15.5)
MAGNESIUM SERPL-MCNC: 2.1 MG/DL — SIGNIFICANT CHANGE UP (ref 1.6–2.6)
MCHC RBC-ENTMCNC: 26 PG — LOW (ref 27–34)
MCHC RBC-ENTMCNC: 32 % — SIGNIFICANT CHANGE UP (ref 32–36)
MCV RBC AUTO: 81.1 FL — SIGNIFICANT CHANGE UP (ref 80–100)
NRBC # FLD: 0 K/UL — SIGNIFICANT CHANGE UP (ref 0–0)
PHOSPHATE SERPL-MCNC: 1.6 MG/DL — LOW (ref 2.5–4.5)
PLATELET # BLD AUTO: 263 K/UL — SIGNIFICANT CHANGE UP (ref 150–400)
PMV BLD: 10.7 FL — SIGNIFICANT CHANGE UP (ref 7–13)
POTASSIUM SERPL-MCNC: 3.7 MMOL/L — SIGNIFICANT CHANGE UP (ref 3.5–5.3)
POTASSIUM SERPL-SCNC: 3.7 MMOL/L — SIGNIFICANT CHANGE UP (ref 3.5–5.3)
RBC # BLD: 4.77 M/UL — SIGNIFICANT CHANGE UP (ref 3.8–5.2)
RBC # FLD: 13.7 % — SIGNIFICANT CHANGE UP (ref 10.3–14.5)
SODIUM SERPL-SCNC: 136 MMOL/L — SIGNIFICANT CHANGE UP (ref 135–145)
WBC # BLD: 5.99 K/UL — SIGNIFICANT CHANGE UP (ref 3.8–10.5)
WBC # FLD AUTO: 5.99 K/UL — SIGNIFICANT CHANGE UP (ref 3.8–10.5)

## 2019-07-04 RX ADMIN — AMLODIPINE BESYLATE 5 MILLIGRAM(S): 2.5 TABLET ORAL at 12:25

## 2019-07-04 RX ADMIN — ONDANSETRON 4 MILLIGRAM(S): 8 TABLET, FILM COATED ORAL at 18:21

## 2019-07-04 RX ADMIN — LISINOPRIL 20 MILLIGRAM(S): 2.5 TABLET ORAL at 10:12

## 2019-07-04 RX ADMIN — ENOXAPARIN SODIUM 40 MILLIGRAM(S): 100 INJECTION SUBCUTANEOUS at 12:25

## 2019-07-04 NOTE — PROGRESS NOTE ADULT - SUBJECTIVE AND OBJECTIVE BOX
Team D Surgery Progress Note     SUBJECTIVE / 24H EVENTS  Patient seen and examined on morning rounds with Dr. Walton . No acute events overnight.    OBJECTIVE:    VITAL SIGNS:  Vital Signs Last 24 Hrs  T(C): 37.1 (04 Jul 2019 12:00), Max: 37.1 (03 Jul 2019 15:52)  T(F): 98.8 (04 Jul 2019 12:00), Max: 98.8 (04 Jul 2019 12:00)  HR: 69 (04 Jul 2019 12:00) (69 - 83)  BP: 155/80 (04 Jul 2019 12:00) (134/73 - 158/71)  BP(mean): --  RR: 16 (04 Jul 2019 12:00) (16 - 18)  SpO2: 100% (04 Jul 2019 12:00) (95% - 100%)    Physical Exam:  General Appearance: Appears well, NAD  Respiratory: No labored breathing  CV: Pulse regularly present  Abdomen: Soft, nontender, midline incision c/d/i      I&O's Summary    03 Jul 2019 07:01  -  04 Jul 2019 07:00  --------------------------------------------------------  IN: 0 mL / OUT: 800 mL / NET: -800 mL              LAB VALUES:  07-03    135  |  100  |  5<L>  ----------------------------<  169<H>  3.6   |  20<L>  |  0.47<L>    Ca    8.0<L>      03 Jul 2019 05:27  Phos  1.4     07-03  Mg     2.0     07-03    MICROBIOLOGY:    No new microbiology data for review.     RADIOLOGY:  PACS Image: Image(s) Available (07-02-19 @ 21:08)    No new radiographic images for review.    MEDICATIONS  (STANDING):  acetaminophen  IVPB .. 1000 milliGRAM(s) IV Intermittent once  amLODIPine   Tablet 5 milliGRAM(s) Oral daily  dextrose 5% + sodium chloride 0.45%. 1000 milliLiter(s) (75 mL/Hr) IV Continuous <Continuous>  docusate sodium 100 milliGRAM(s) Oral daily  enoxaparin Injectable 40 milliGRAM(s) SubCutaneous daily  lisinopril 20 milliGRAM(s) Oral daily  pantoprazole    Tablet 40 milliGRAM(s) Oral before breakfast  senna 1 Tablet(s) Oral daily  traMADol 25 milliGRAM(s) Oral once    MEDICATIONS  (PRN):  metoclopramide Injectable 10 milliGRAM(s) IV Push every 8 hours PRN nausea  morphine  - Injectable 2 milliGRAM(s) IV Push every 6 hours PRN Severe Pain (7 - 10)  ondansetron Injectable 4 milliGRAM(s) IV Push every 6 hours PRN Nausea  oxyCODONE    IR 5 milliGRAM(s) Oral every 3 hours PRN Moderate Pain (4 - 6)             Assessment and Plan:   · Assessment		  68F s/p open liver biopsy, omental biopsy, exploratory laparotomy 6/26 for metastatic GIST. CT abdomen 7/2 showed no bowel obstruction or intra-abdominal collection  Plan:  - upper GI series Friday  - c/w pain control  - OOB   - Regular diet as tolerated   - d/c planning with 28 days of lovenox.     D Team Surgical Oncology Pager #[phone]

## 2019-07-05 LAB
ANION GAP SERPL CALC-SCNC: 15 MMO/L — HIGH (ref 7–14)
BUN SERPL-MCNC: 8 MG/DL — SIGNIFICANT CHANGE UP (ref 7–23)
CALCIUM SERPL-MCNC: 8.3 MG/DL — LOW (ref 8.4–10.5)
CHLORIDE SERPL-SCNC: 101 MMOL/L — SIGNIFICANT CHANGE UP (ref 98–107)
CO2 SERPL-SCNC: 20 MMOL/L — LOW (ref 22–31)
CREAT SERPL-MCNC: 0.55 MG/DL — SIGNIFICANT CHANGE UP (ref 0.5–1.3)
GLUCOSE SERPL-MCNC: 140 MG/DL — HIGH (ref 70–99)
HCT VFR BLD CALC: 40.3 % — SIGNIFICANT CHANGE UP (ref 34.5–45)
HGB BLD-MCNC: 13 G/DL — SIGNIFICANT CHANGE UP (ref 11.5–15.5)
MAGNESIUM SERPL-MCNC: 2.2 MG/DL — SIGNIFICANT CHANGE UP (ref 1.6–2.6)
MCHC RBC-ENTMCNC: 25.8 PG — LOW (ref 27–34)
MCHC RBC-ENTMCNC: 32.3 % — SIGNIFICANT CHANGE UP (ref 32–36)
MCV RBC AUTO: 80 FL — SIGNIFICANT CHANGE UP (ref 80–100)
NRBC # FLD: 0 K/UL — SIGNIFICANT CHANGE UP (ref 0–0)
PHOSPHATE SERPL-MCNC: 1.9 MG/DL — LOW (ref 2.5–4.5)
PLATELET # BLD AUTO: 343 K/UL — SIGNIFICANT CHANGE UP (ref 150–400)
PMV BLD: 9.8 FL — SIGNIFICANT CHANGE UP (ref 7–13)
POTASSIUM SERPL-MCNC: 3.9 MMOL/L — SIGNIFICANT CHANGE UP (ref 3.5–5.3)
POTASSIUM SERPL-SCNC: 3.9 MMOL/L — SIGNIFICANT CHANGE UP (ref 3.5–5.3)
RBC # BLD: 5.04 M/UL — SIGNIFICANT CHANGE UP (ref 3.8–5.2)
RBC # FLD: 14 % — SIGNIFICANT CHANGE UP (ref 10.3–14.5)
SODIUM SERPL-SCNC: 136 MMOL/L — SIGNIFICANT CHANGE UP (ref 135–145)
WBC # BLD: 6.34 K/UL — SIGNIFICANT CHANGE UP (ref 3.8–10.5)
WBC # FLD AUTO: 6.34 K/UL — SIGNIFICANT CHANGE UP (ref 3.8–10.5)

## 2019-07-05 PROCEDURE — 74018 RADEX ABDOMEN 1 VIEW: CPT | Mod: 26,59

## 2019-07-05 PROCEDURE — 74241: CPT | Mod: 26

## 2019-07-05 RX ADMIN — Medication 63.75 MILLIMOLE(S): at 10:10

## 2019-07-05 RX ADMIN — SODIUM CHLORIDE 75 MILLILITER(S): 9 INJECTION, SOLUTION INTRAVENOUS at 17:36

## 2019-07-05 RX ADMIN — ENOXAPARIN SODIUM 40 MILLIGRAM(S): 100 INJECTION SUBCUTANEOUS at 13:30

## 2019-07-05 RX ADMIN — AMLODIPINE BESYLATE 5 MILLIGRAM(S): 2.5 TABLET ORAL at 10:11

## 2019-07-05 RX ADMIN — LISINOPRIL 20 MILLIGRAM(S): 2.5 TABLET ORAL at 10:11

## 2019-07-05 NOTE — PROGRESS NOTE ADULT - SUBJECTIVE AND OBJECTIVE BOX
Team D Surgery Progress Note     SUBJECTIVE / 24H EVENTS  Patient seen and examined on morning rounds. No acute events overnight. Patient continues to not have an appetite with nausea.    OBJECTIVE:    Vital Signs Last 24 Hrs  T(C): 36.9 (05 Jul 2019 08:19), Max: 37.1 (04 Jul 2019 12:00)  T(F): 98.4 (05 Jul 2019 08:19), Max: 98.8 (04 Jul 2019 12:00)  HR: 72 (05 Jul 2019 08:19) (66 - 82)  BP: 112/60 (05 Jul 2019 08:19) (112/60 - 155/80)  BP(mean): --  RR: 18 (05 Jul 2019 08:19) (16 - 18)  SpO2: 100% (05 Jul 2019 08:19) (100% - 100%)    Physical Exam:  General Appearance: Appears well, NAD  Respiratory: No labored breathing  CV: Pulse regularly present  Abdomen: Soft, nontender, midline incision c/d/i    I&O's Detail    04 Jul 2019 07:01  -  05 Jul 2019 07:00  --------------------------------------------------------  IN:    dextrose 5% + sodium chloride 0.45%.: 900 mL    Oral Fluid: 240 mL  Total IN: 1140 mL    OUT:  Total OUT: 0 mL    Total NET: 1140 mL      07-05    136  |  101  |  8   ----------------------------<  140<H>  3.9   |  20<L>  |  0.55    Ca    8.3<L>      05 Jul 2019 05:56  Phos  1.9     07-05  Mg     2.2     07-05                          13.0   6.34  )-----------( 343      ( 05 Jul 2019 05:56 )             40.3       MICROBIOLOGY:    No new microbiology data for review.     RADIOLOGY:  PACS Image: Image(s) Available (07-02-19 @ 21:08)    No new radiographic images for review.    MEDICATIONS  (STANDING):  acetaminophen  IVPB .. 1000 milliGRAM(s) IV Intermittent once  amLODIPine   Tablet 5 milliGRAM(s) Oral daily  dextrose 5% + sodium chloride 0.45%. 1000 milliLiter(s) (75 mL/Hr) IV Continuous <Continuous>  docusate sodium 100 milliGRAM(s) Oral daily  enoxaparin Injectable 40 milliGRAM(s) SubCutaneous daily  lisinopril 20 milliGRAM(s) Oral daily  pantoprazole    Tablet 40 milliGRAM(s) Oral before breakfast  senna 1 Tablet(s) Oral daily  traMADol 25 milliGRAM(s) Oral once    MEDICATIONS  (PRN):  metoclopramide Injectable 10 milliGRAM(s) IV Push every 8 hours PRN nausea  morphine  - Injectable 2 milliGRAM(s) IV Push every 6 hours PRN Severe Pain (7 - 10)  ondansetron Injectable 4 milliGRAM(s) IV Push every 6 hours PRN Nausea  oxyCODONE    IR 5 milliGRAM(s) Oral every 3 hours PRN Moderate Pain (4 - 6)             Assessment and Plan:   · Assessment		  68F s/p open liver biopsy, omental biopsy, exploratory laparotomy 6/26 for metastatic GIST. CT abdomen 7/2 showed no bowel obstruction or intra-abdominal collection  Plan:  - upper GI series today  - c/w pain control  - OOB   - Regular diet as tolerated   - d/c planning with 28 days of lovenox.     D Team Surgical Oncology Pager #[phone]

## 2019-07-06 LAB
ANION GAP SERPL CALC-SCNC: 15 MMO/L — HIGH (ref 7–14)
BUN SERPL-MCNC: 9 MG/DL — SIGNIFICANT CHANGE UP (ref 7–23)
CALCIUM SERPL-MCNC: 8.4 MG/DL — SIGNIFICANT CHANGE UP (ref 8.4–10.5)
CHLORIDE SERPL-SCNC: 101 MMOL/L — SIGNIFICANT CHANGE UP (ref 98–107)
CO2 SERPL-SCNC: 20 MMOL/L — LOW (ref 22–31)
CREAT SERPL-MCNC: 0.49 MG/DL — LOW (ref 0.5–1.3)
GLUCOSE SERPL-MCNC: 139 MG/DL — HIGH (ref 70–99)
HCT VFR BLD CALC: 38.3 % — SIGNIFICANT CHANGE UP (ref 34.5–45)
HGB BLD-MCNC: 12.3 G/DL — SIGNIFICANT CHANGE UP (ref 11.5–15.5)
MAGNESIUM SERPL-MCNC: 2.1 MG/DL — SIGNIFICANT CHANGE UP (ref 1.6–2.6)
MCHC RBC-ENTMCNC: 26.1 PG — LOW (ref 27–34)
MCHC RBC-ENTMCNC: 32.1 % — SIGNIFICANT CHANGE UP (ref 32–36)
MCV RBC AUTO: 81.1 FL — SIGNIFICANT CHANGE UP (ref 80–100)
NRBC # FLD: 0 K/UL — SIGNIFICANT CHANGE UP (ref 0–0)
PHOSPHATE SERPL-MCNC: 1.9 MG/DL — LOW (ref 2.5–4.5)
PLATELET # BLD AUTO: 293 K/UL — SIGNIFICANT CHANGE UP (ref 150–400)
PMV BLD: 10.1 FL — SIGNIFICANT CHANGE UP (ref 7–13)
POTASSIUM SERPL-MCNC: 3.6 MMOL/L — SIGNIFICANT CHANGE UP (ref 3.5–5.3)
POTASSIUM SERPL-SCNC: 3.6 MMOL/L — SIGNIFICANT CHANGE UP (ref 3.5–5.3)
RBC # BLD: 4.72 M/UL — SIGNIFICANT CHANGE UP (ref 3.8–5.2)
RBC # FLD: 14 % — SIGNIFICANT CHANGE UP (ref 10.3–14.5)
SODIUM SERPL-SCNC: 136 MMOL/L — SIGNIFICANT CHANGE UP (ref 135–145)
WBC # BLD: 5.89 K/UL — SIGNIFICANT CHANGE UP (ref 3.8–10.5)
WBC # FLD AUTO: 5.89 K/UL — SIGNIFICANT CHANGE UP (ref 3.8–10.5)

## 2019-07-06 RX ORDER — POTASSIUM PHOSPHATE, MONOBASIC POTASSIUM PHOSPHATE, DIBASIC 236; 224 MG/ML; MG/ML
15 INJECTION, SOLUTION INTRAVENOUS ONCE
Refills: 0 | Status: COMPLETED | OUTPATIENT
Start: 2019-07-06 | End: 2019-07-06

## 2019-07-06 RX ORDER — POTASSIUM CHLORIDE 20 MEQ
10 PACKET (EA) ORAL
Refills: 0 | Status: COMPLETED | OUTPATIENT
Start: 2019-07-06 | End: 2019-07-06

## 2019-07-06 RX ORDER — METOCLOPRAMIDE HCL 10 MG
10 TABLET ORAL EVERY 6 HOURS
Refills: 0 | Status: DISCONTINUED | OUTPATIENT
Start: 2019-07-06 | End: 2019-07-09

## 2019-07-06 RX ADMIN — SODIUM CHLORIDE 75 MILLILITER(S): 9 INJECTION, SOLUTION INTRAVENOUS at 22:07

## 2019-07-06 RX ADMIN — Medication 100 MILLIEQUIVALENT(S): at 17:57

## 2019-07-06 RX ADMIN — POTASSIUM PHOSPHATE, MONOBASIC POTASSIUM PHOSPHATE, DIBASIC 62.5 MILLIMOLE(S): 236; 224 INJECTION, SOLUTION INTRAVENOUS at 22:07

## 2019-07-06 RX ADMIN — SODIUM CHLORIDE 75 MILLILITER(S): 9 INJECTION, SOLUTION INTRAVENOUS at 16:52

## 2019-07-06 RX ADMIN — Medication 10 MILLIGRAM(S): at 18:58

## 2019-07-06 RX ADMIN — LISINOPRIL 20 MILLIGRAM(S): 2.5 TABLET ORAL at 09:22

## 2019-07-06 RX ADMIN — ENOXAPARIN SODIUM 40 MILLIGRAM(S): 100 INJECTION SUBCUTANEOUS at 12:10

## 2019-07-06 NOTE — PROGRESS NOTE ADULT - SUBJECTIVE AND OBJECTIVE BOX
Team D Surgery Progress Note     SUBJECTIVE / 24H EVENTS  Patient seen and examined on morning rounds. No acute events overnight. Patient continues to not have an appetite with nausea.    OBJECTIVE:    Vital Signs Last 24 Hrs  T(C): 36.9 (06 Jul 2019 23:56), Max: 36.9 (06 Jul 2019 23:56)  T(F): 98.4 (06 Jul 2019 23:56), Max: 98.4 (06 Jul 2019 23:56)  HR: 71 (06 Jul 2019 23:56) (70 - 74)  BP: 137/63 (06 Jul 2019 23:56) (137/63 - 162/78)  BP(mean): 90 (06 Jul 2019 08:09) (90 - 90)  RR: 18 (06 Jul 2019 23:56) (16 - 18)  SpO2: 100% (06 Jul 2019 23:56) (94% - 100%)    Physical Exam:  General Appearance: Appears well, NAD  Respiratory: No labored breathing  CV: RRR  Abdomen: Soft, midline incision c/d/i, some tenderness at inferior border of incision     I&O's Detail    05 Jul 2019 07:01  -  06 Jul 2019 07:00  --------------------------------------------------------  IN:    dextrose 5% + sodium chloride 0.45%.: 900 mL  Total IN: 900 mL    OUT:  Total OUT: 0 mL    Total NET: 900 mL    Labs:    07-06    136  |  101  |  9   ----------------------------<  139<H>  3.6   |  20<L>  |  0.49<L>    Ca    8.4      06 Jul 2019 05:46  Phos  1.9     07-06  Mg     2.1     07-06                          12.3   5.89  )-----------( 293      ( 06 Jul 2019 05:46 )             38.3       MICROBIOLOGY:    No new microbiology data for review.     RADIOLOGY:    No new radiographic images for review.    MEDICATIONS  (STANDING):  acetaminophen  IVPB .. 1000 milliGRAM(s) IV Intermittent once  amLODIPine   Tablet 5 milliGRAM(s) Oral daily  dextrose 5% + sodium chloride 0.45%. 1000 milliLiter(s) (75 mL/Hr) IV Continuous <Continuous>  docusate sodium 100 milliGRAM(s) Oral daily  enoxaparin Injectable 40 milliGRAM(s) SubCutaneous daily  lisinopril 20 milliGRAM(s) Oral daily  pantoprazole    Tablet 40 milliGRAM(s) Oral before breakfast  senna 1 Tablet(s) Oral daily  traMADol 25 milliGRAM(s) Oral once    MEDICATIONS  (PRN):  metoclopramide Injectable 10 milliGRAM(s) IV Push every 8 hours PRN nausea  morphine  - Injectable 2 milliGRAM(s) IV Push every 6 hours PRN Severe Pain (7 - 10)  ondansetron Injectable 4 milliGRAM(s) IV Push every 6 hours PRN Nausea  oxyCODONE    IR 5 milliGRAM(s) Oral every 3 hours PRN Moderate Pain (4 - 6)

## 2019-07-06 NOTE — PROGRESS NOTE ADULT - ASSESSMENT
Assessment and Plan:   · Assessment		  68F s/p open liver biopsy, omental biopsy, exploratory laparotomy 6/26 for metastatic GIST. CT abdomen 7/2 showed no bowel obstruction or intra-abdominal collection  Plan:  - upper GI series showed no obstruction or leak   - c/w pain control  - OOB   - Post gastrectomy diet   - Reglan 10 mg q6h scheduled   - d/c planning with 28 days of lovenox.     Patient seen and examined with Dr. Anton HAMILTON Team Surgical Oncology Pager #[phone]

## 2019-07-07 LAB
ANION GAP SERPL CALC-SCNC: 13 MMO/L — SIGNIFICANT CHANGE UP (ref 7–14)
BUN SERPL-MCNC: 7 MG/DL — SIGNIFICANT CHANGE UP (ref 7–23)
CALCIUM SERPL-MCNC: 8.3 MG/DL — LOW (ref 8.4–10.5)
CHLORIDE SERPL-SCNC: 102 MMOL/L — SIGNIFICANT CHANGE UP (ref 98–107)
CO2 SERPL-SCNC: 23 MMOL/L — SIGNIFICANT CHANGE UP (ref 22–31)
CREAT SERPL-MCNC: 0.45 MG/DL — LOW (ref 0.5–1.3)
GLUCOSE SERPL-MCNC: 118 MG/DL — HIGH (ref 70–99)
HCT VFR BLD CALC: 37.1 % — SIGNIFICANT CHANGE UP (ref 34.5–45)
HGB BLD-MCNC: 11.8 G/DL — SIGNIFICANT CHANGE UP (ref 11.5–15.5)
MAGNESIUM SERPL-MCNC: 2 MG/DL — SIGNIFICANT CHANGE UP (ref 1.6–2.6)
MCHC RBC-ENTMCNC: 25.4 PG — LOW (ref 27–34)
MCHC RBC-ENTMCNC: 31.8 % — LOW (ref 32–36)
MCV RBC AUTO: 79.8 FL — LOW (ref 80–100)
NRBC # FLD: 0 K/UL — SIGNIFICANT CHANGE UP (ref 0–0)
PHOSPHATE SERPL-MCNC: 2.6 MG/DL — SIGNIFICANT CHANGE UP (ref 2.5–4.5)
PLATELET # BLD AUTO: 292 K/UL — SIGNIFICANT CHANGE UP (ref 150–400)
PMV BLD: 10.1 FL — SIGNIFICANT CHANGE UP (ref 7–13)
POTASSIUM SERPL-MCNC: 3.8 MMOL/L — SIGNIFICANT CHANGE UP (ref 3.5–5.3)
POTASSIUM SERPL-SCNC: 3.8 MMOL/L — SIGNIFICANT CHANGE UP (ref 3.5–5.3)
RBC # BLD: 4.65 M/UL — SIGNIFICANT CHANGE UP (ref 3.8–5.2)
RBC # FLD: 14 % — SIGNIFICANT CHANGE UP (ref 10.3–14.5)
SODIUM SERPL-SCNC: 138 MMOL/L — SIGNIFICANT CHANGE UP (ref 135–145)
WBC # BLD: 5.27 K/UL — SIGNIFICANT CHANGE UP (ref 3.8–10.5)
WBC # FLD AUTO: 5.27 K/UL — SIGNIFICANT CHANGE UP (ref 3.8–10.5)

## 2019-07-07 RX ADMIN — Medication 10 MILLIGRAM(S): at 23:55

## 2019-07-07 RX ADMIN — Medication 10 MILLIGRAM(S): at 09:00

## 2019-07-07 RX ADMIN — Medication 10 MILLIGRAM(S): at 12:34

## 2019-07-07 RX ADMIN — LISINOPRIL 20 MILLIGRAM(S): 2.5 TABLET ORAL at 08:57

## 2019-07-07 RX ADMIN — Medication 10 MILLIGRAM(S): at 18:35

## 2019-07-07 RX ADMIN — AMLODIPINE BESYLATE 5 MILLIGRAM(S): 2.5 TABLET ORAL at 08:57

## 2019-07-07 RX ADMIN — PANTOPRAZOLE SODIUM 40 MILLIGRAM(S): 20 TABLET, DELAYED RELEASE ORAL at 08:57

## 2019-07-07 RX ADMIN — ENOXAPARIN SODIUM 40 MILLIGRAM(S): 100 INJECTION SUBCUTANEOUS at 12:34

## 2019-07-07 NOTE — PROGRESS NOTE ADULT - ASSESSMENT
Assessment and Plan:   · Assessment		  68F s/p open liver biopsy, omental biopsy, exploratory laparotomy 6/26 for metastatic GIST. CT abdomen 7/2 showed no bowel obstruction or intra-abdominal collection  Plan:  - upper GI series showed no obstruction or leak   - c/w pain control  - OOB   - Post gastrectomy diet   - Reglan 10 mg q6h scheduled   - d/c planning with 28 days of lovenox.     Patient seen and examined with Dr. Anton HAMILTON Team Surgical Oncology Pager #[phone] Assessment and Plan:   · Assessment		  68F s/p open liver biopsy, omental biopsy, exploratory laparotomy 6/26 for metastatic GIST. CT abdomen 7/2 showed no bowel obstruction or intra-abdominal collection  Plan:  - upper GI series showed no obstruction or leak   - c/w pain control  - OOB   - Post gastrectomy diet   - calorie counting  - Reglan 10 mg q6h scheduled   - d/c planning with 28 days of lovenox.     Patient seen and examined with Dr. Anton HAMILTON Team Surgical Oncology Pager #[phone]

## 2019-07-07 NOTE — PROGRESS NOTE ADULT - SUBJECTIVE AND OBJECTIVE BOX
Team D Surgery Progress Note     SUBJECTIVE / 24H EVENTS  Patient seen and examined on morning rounds. No acute events overnight. Patient continues to not have an appetite with nausea.    OBJECTIVE:    Vital Signs Last 24 Hrs  T(C): 37 (07 Jul 2019 04:55), Max: 37 (07 Jul 2019 04:55)  T(F): 98.6 (07 Jul 2019 04:55), Max: 98.6 (07 Jul 2019 04:55)  HR: 68 (07 Jul 2019 04:55) (68 - 74)  BP: 155/77 (07 Jul 2019 04:55) (137/63 - 162/78)  BP(mean): 90 (06 Jul 2019 08:09) (90 - 90)  RR: 18 (07 Jul 2019 04:55) (16 - 18)  SpO2: 100% (07 Jul 2019 04:55) (94% - 100%)    Physical Exam:  General Appearance: Appears well, NAD  Respiratory: No labored breathing  CV: RRR  Abdomen: Soft, midline incision c/d/i, some tenderness at inferior border of incision     I&O's Detail    06 Jul 2019 07:01  -  07 Jul 2019 07:00  --------------------------------------------------------  IN:    dextrose 5% + sodium chloride 0.45%.: 900 mL    IV PiggyBack: 250 mL  Total IN: 1150 mL    OUT:    Voided: 400 mL  Total OUT: 400 mL    Total NET: 750 mL          Labs:    I&O's Detail    06 Jul 2019 07:01  -  07 Jul 2019 07:00  --------------------------------------------------------  IN:    dextrose 5% + sodium chloride 0.45%.: 900 mL    IV PiggyBack: 250 mL  Total IN: 1150 mL    OUT:    Voided: 400 mL  Total OUT: 400 mL    Total NET: 750 mL                                         11.8   5.27  )-----------( 292      ( 07 Jul 2019 04:42 )             37.1         MICROBIOLOGY:    No new microbiology data for review.     RADIOLOGY:    No new radiographic images for review.    MEDICATIONS  (STANDING):  acetaminophen  IVPB .. 1000 milliGRAM(s) IV Intermittent once  amLODIPine   Tablet 5 milliGRAM(s) Oral daily  dextrose 5% + sodium chloride 0.45%. 1000 milliLiter(s) (75 mL/Hr) IV Continuous <Continuous>  docusate sodium 100 milliGRAM(s) Oral daily  enoxaparin Injectable 40 milliGRAM(s) SubCutaneous daily  lisinopril 20 milliGRAM(s) Oral daily  pantoprazole    Tablet 40 milliGRAM(s) Oral before breakfast  senna 1 Tablet(s) Oral daily  traMADol 25 milliGRAM(s) Oral once    MEDICATIONS  (PRN):  metoclopramide Injectable 10 milliGRAM(s) IV Push every 8 hours PRN nausea  morphine  - Injectable 2 milliGRAM(s) IV Push every 6 hours PRN Severe Pain (7 - 10)  ondansetron Injectable 4 milliGRAM(s) IV Push every 6 hours PRN Nausea  oxyCODONE    IR 5 milliGRAM(s) Oral every 3 hours PRN Moderate Pain (4 - 6)

## 2019-07-08 ENCOUNTER — TRANSCRIPTION ENCOUNTER (OUTPATIENT)
Age: 69
End: 2019-07-08

## 2019-07-08 LAB
ANION GAP SERPL CALC-SCNC: 12 MMO/L — SIGNIFICANT CHANGE UP (ref 7–14)
BUN SERPL-MCNC: 8 MG/DL — SIGNIFICANT CHANGE UP (ref 7–23)
CALCIUM SERPL-MCNC: 9.4 MG/DL — SIGNIFICANT CHANGE UP (ref 8.4–10.5)
CHLORIDE SERPL-SCNC: 101 MMOL/L — SIGNIFICANT CHANGE UP (ref 98–107)
CO2 SERPL-SCNC: 23 MMOL/L — SIGNIFICANT CHANGE UP (ref 22–31)
CREAT SERPL-MCNC: 0.55 MG/DL — SIGNIFICANT CHANGE UP (ref 0.5–1.3)
GLUCOSE SERPL-MCNC: 108 MG/DL — HIGH (ref 70–99)
HCT VFR BLD CALC: 37.9 % — SIGNIFICANT CHANGE UP (ref 34.5–45)
HGB BLD-MCNC: 12.1 G/DL — SIGNIFICANT CHANGE UP (ref 11.5–15.5)
MAGNESIUM SERPL-MCNC: 1.9 MG/DL — SIGNIFICANT CHANGE UP (ref 1.6–2.6)
MCHC RBC-ENTMCNC: 25.5 PG — LOW (ref 27–34)
MCHC RBC-ENTMCNC: 31.9 % — LOW (ref 32–36)
MCV RBC AUTO: 79.8 FL — LOW (ref 80–100)
NRBC # FLD: 0.02 K/UL — SIGNIFICANT CHANGE UP (ref 0–0)
PHOSPHATE SERPL-MCNC: 3.2 MG/DL — SIGNIFICANT CHANGE UP (ref 2.5–4.5)
PLATELET # BLD AUTO: 296 K/UL — SIGNIFICANT CHANGE UP (ref 150–400)
PMV BLD: 9.9 FL — SIGNIFICANT CHANGE UP (ref 7–13)
POTASSIUM SERPL-MCNC: 3.8 MMOL/L — SIGNIFICANT CHANGE UP (ref 3.5–5.3)
POTASSIUM SERPL-SCNC: 3.8 MMOL/L — SIGNIFICANT CHANGE UP (ref 3.5–5.3)
RBC # BLD: 4.75 M/UL — SIGNIFICANT CHANGE UP (ref 3.8–5.2)
RBC # FLD: 13.9 % — SIGNIFICANT CHANGE UP (ref 10.3–14.5)
SODIUM SERPL-SCNC: 136 MMOL/L — SIGNIFICANT CHANGE UP (ref 135–145)
WBC # BLD: 6.65 K/UL — SIGNIFICANT CHANGE UP (ref 3.8–10.5)
WBC # FLD AUTO: 6.65 K/UL — SIGNIFICANT CHANGE UP (ref 3.8–10.5)

## 2019-07-08 PROCEDURE — 93010 ELECTROCARDIOGRAM REPORT: CPT

## 2019-07-08 RX ADMIN — PANTOPRAZOLE SODIUM 40 MILLIGRAM(S): 20 TABLET, DELAYED RELEASE ORAL at 08:29

## 2019-07-08 RX ADMIN — Medication 10 MILLIGRAM(S): at 08:29

## 2019-07-08 RX ADMIN — Medication 10 MILLIGRAM(S): at 11:53

## 2019-07-08 RX ADMIN — AMLODIPINE BESYLATE 5 MILLIGRAM(S): 2.5 TABLET ORAL at 08:56

## 2019-07-08 RX ADMIN — LISINOPRIL 20 MILLIGRAM(S): 2.5 TABLET ORAL at 08:56

## 2019-07-08 RX ADMIN — SODIUM CHLORIDE 75 MILLILITER(S): 9 INJECTION, SOLUTION INTRAVENOUS at 17:55

## 2019-07-08 RX ADMIN — ENOXAPARIN SODIUM 40 MILLIGRAM(S): 100 INJECTION SUBCUTANEOUS at 11:54

## 2019-07-08 RX ADMIN — Medication 10 MILLIGRAM(S): at 17:55

## 2019-07-08 NOTE — PROGRESS NOTE ADULT - SUBJECTIVE AND OBJECTIVE BOX
Team D Surgery Progress Note     SUBJECTIVE / 24H EVENTS  Patient seen and examined on morning rounds. No acute events overnight.    OBJECTIVE:    Vital Signs Last 24 Hrs  T(C): 36.8 (08 Jul 2019 16:07), Max: 36.9 (08 Jul 2019 00:29)  T(F): 98.3 (08 Jul 2019 16:07), Max: 98.5 (08 Jul 2019 00:29)  HR: 62 (08 Jul 2019 16:07) (61 - 78)  BP: 137/66 (08 Jul 2019 16:07) (116/66 - 156/66)  BP(mean): --  RR: 16 (08 Jul 2019 16:07) (16 - 18)  SpO2: 100% (08 Jul 2019 16:07) (96% - 100%)    Physical Exam:  General Appearance: Appears well, NAD  Respiratory: No labored breathing  CV: RRR  Abdomen: Soft, midline incision c/d/i     I&O's Detail    07 Jul 2019 07:01  -  08 Jul 2019 07:00  --------------------------------------------------------  IN:    dextrose 5% + sodium chloride 0.45%.: 675 mL    Oral Fluid: 480 mL  Total IN: 1155 mL    OUT:  Total OUT: 0 mL    Total NET: 1155 mL      08 Jul 2019 07:01  -  08 Jul 2019 23:37  --------------------------------------------------------  IN:    dextrose 5% + sodium chloride 0.45%.: 600 mL  Total IN: 600 mL    OUT:  Total OUT: 0 mL    Total NET: 600 mL      LABS  136  |  101  |  8   ----------------------------<  108<H>  3.8   |  23  |  0.55    Ca    9.4      08 Jul 2019 06:21  Phos  3.2     07-08  Mg     1.9     07-08                        12.1   6.65  )-----------( 296      ( 08 Jul 2019 06:21 )             37.9       MICROBIOLOGY:    No new microbiology data for review.     RADIOLOGY:    No new radiographic images for review.        MEDICATIONS  (STANDING):  acetaminophen  IVPB .. 1000 milliGRAM(s) IV Intermittent once  amLODIPine   Tablet 5 milliGRAM(s) Oral daily  dextrose 5% + sodium chloride 0.45%. 1000 milliLiter(s) (75 mL/Hr) IV Continuous <Continuous>  docusate sodium 100 milliGRAM(s) Oral daily  enoxaparin Injectable 40 milliGRAM(s) SubCutaneous daily  lisinopril 20 milliGRAM(s) Oral daily  pantoprazole    Tablet 40 milliGRAM(s) Oral before breakfast  senna 1 Tablet(s) Oral daily  traMADol 25 milliGRAM(s) Oral once    MEDICATIONS  (PRN):  metoclopramide Injectable 10 milliGRAM(s) IV Push every 8 hours PRN nausea  morphine  - Injectable 2 milliGRAM(s) IV Push every 6 hours PRN Severe Pain (7 - 10)  ondansetron Injectable 4 milliGRAM(s) IV Push every 6 hours PRN Nausea  oxyCODONE    IR 5 milliGRAM(s) Oral every 3 hours PRN Moderate Pain (4 - 6)

## 2019-07-08 NOTE — PROVIDER CONTACT NOTE (OTHER) - ACTION/TREATMENT ORDERED:
as per MD santiago vasotec bc of HR, will add hold parameter to order
PA aware, EKG to be ordered. RN awaiting order.
Provider came to speak to patient about taking PO medication. Pt still refused, Hydralazine  IV was given. will continue to monitor pt BP

## 2019-07-08 NOTE — DISCHARGE NOTE PROVIDER - NSDCFUADDAPPT_GEN_ALL_CORE_FT
Follow up with Dr. Montejo in 2 weeks.  Please make an appointment to follow up with oncologist within 1 week. Follow up with Dr. Montejo in 2 weeks.  Please make an appointment to follow up with oncologist within 1 week.  Please follow up with Primary care doctor and cardiologist in 1 week.

## 2019-07-08 NOTE — PROVIDER CONTACT NOTE (OTHER) - RECOMMENDATIONS
MD made aware
As per provider, continue to monitor. No new orders provided
Have provider order IV medication for BP
PA to be made aware.

## 2019-07-08 NOTE — DISCHARGE NOTE PROVIDER - HOSPITAL COURSE
This patient is a 68 year old female with hx of GIST tumor (s/p robotic partial gastric resection in 2017, intermittently on gleevec -->now on tasigna), HTN, RA who presented to [facility] recently with abdominal pain x 5 days. Work up identified recurrent GIST.  There was initially concern for bony metastases.  Workup during last admission included MRI of L/S spine and bony pelvis -- no evidence of bony mets.          In terms of her GIST tumor: diagnosed in 2017 w/ resection of the GIST tumor. Patient was on gleevec on three separate occasions but had to stop due to edema. Had a surveillance scan in 2018 which had revealed a lesion in the inferior pubic ramus and hypodense liver lesion which was biopsied 10/2018 with path report: metastatic GIST tumor. As per review - - was recommended to go to [facility] for evaluation by Dr. Gastelum but had never went. Now sent in by her medical oncologist for surg onc evaluation.         She was discharged from the hospital and returned on 6/29/19 through [facility] for admission and resection of the recurrent tumor and the isolated liver met.        This patient went to the OR with Dr. Montejo on 7/26/19 for an exploratory laparotomy, omental biopsy and liver biopsy. Patient tolerated the procedure well, without complication. Patient remained hemodynamically stable in the PACU and transferred to the surgical floor. BY POD#3, patient's PCEA and jimenez catheters were discontinued and patient was transitioned to PO pain medications. Diet was restarted and advanced as tolerated. Patient had a CT abdomen and pelvis on 7/2/19 to evaluate for abscess or obstruction which revealed the following:     FINDINGS:        LOWER CHEST: Coronary calcification.        LIVER: A 2.3 x 1.8 cm right hepatic lobe metastasis not significantly     changed from recent prior imaging. A subtle additional 1.8 x 1.5 cm left     hepatic lobe medial segment lesion is more conspicuous on the examination     today however also likely unchanged.     BILE DUCTS: Normal caliber.    GALLBLADDER: Within normal limits.    SPLEEN: Within normal limits.    PANCREAS: Within normal limits.    ADRENALS: Within normal limits.    KIDNEYS/URETERS: Within normal limits.        BLADDER: Within normal limits.    REPRODUCTIVE ORGANS: Calcified uterine fibroid. No adnexal masses.        BOWEL: Status post partial gastrectomy. No bowel obstruction. Colonic     diverticulosis without diverticulitis.    PERITONEUM: No ascites. A mass n the gastrohepatic ligament. Gastric     resection site measures 7.1 x 6.5 cm, previously 5.5 x 4.6 cm. Additional     peritoneal disease is now identified with the largest lesion in the left     lower quadrant (2:44) measuring 3.4 x 1.3 cm.     VESSELS:  Atherosclerotic changes.    RETROPERITONEUM: No lymphadenopathy.      ABDOMINAL WALL: Within normal limits.    BONES: Within normal limits.        IMPRESSION:         Recurrent disease in the left upper quadrant is increased and there has     been progression of additional sites of peritoneal disease.        Hepatic metastatic disease without significant change.        No bowel obstruction or evidence of intra-abdominal collection.            At this time, patient is currently ambulating, voiding, tolerating a regular diet. Pain well controlled on PO pain meds. Patient has been deemed stable for discharge home with follow up as an outpatient. This patient is a 68 year old female with hx of GIST tumor (s/p robotic partial gastric resection in 2017, intermittently on gleevec -->now on tasigna), HTN, RA who presented to [facility] recently with abdominal pain x 5 days. Work up identified recurrent GIST.  There was initially concern for bony metastases.  Workup during last admission included MRI of L/S spine and bony pelvis -- no evidence of bony mets.          In terms of her GIST tumor: diagnosed in 2017 w/ resection of the GIST tumor. Patient was on gleevec on three separate occasions but had to stop due to edema. Had a surveillance scan in 2018 which had revealed a lesion in the inferior pubic ramus and hypodense liver lesion which was biopsied 10/2018 with path report: metastatic GIST tumor. As per review - - was recommended to go to [facility] [place] by Dr. Gastelum but had never went. Now sent in by her medical oncologist for surg onc evaluation.         She was discharged from the hospital and returned on 6/29/19 through [facility] for admission and resection of the recurrent tumor and the isolated liver met.        This patient went to the OR with Dr. Montejo on 7/26/19 for an exploratory laparotomy, omental biopsy and liver biopsy. Patient tolerated the procedure well, without complication. Patient remained hemodynamically stable in the PACU and transferred to the surgical floor. BY POD#3, patient's PCEA and jimenez catheters were discontinued and patient was transitioned to PO pain medications. The patient's post-operative course was c/b poor appetite, low PO intake, and persistent nausea.  Patient had a CT abdomen and pelvis on 7/2/19 to evaluate for abscess or obstruction which revealed the following:     FINDINGS:        LOWER CHEST: Coronary calcification.        LIVER: A 2.3 x 1.8 cm right hepatic lobe metastasis not significantly     changed from recent prior imaging. A subtle additional 1.8 x 1.5 cm left     hepatic lobe medial segment lesion is more conspicuous on the examination     today however also likely unchanged.     BILE DUCTS: Normal caliber.    GALLBLADDER: Within normal limits.    SPLEEN: Within normal limits.    PANCREAS: Within normal limits.    ADRENALS: Within normal limits.    KIDNEYS/URETERS: Within normal limits.        BLADDER: Within normal limits.    REPRODUCTIVE ORGANS: Calcified uterine fibroid. No adnexal masses.        BOWEL: Status post partial gastrectomy. No bowel obstruction. Colonic     diverticulosis without diverticulitis.    PERITONEUM: No ascites. A mass n the gastrohepatic ligament. Gastric     resection site measures 7.1 x 6.5 cm, previously 5.5 x 4.6 cm. Additional     peritoneal disease is now identified with the largest lesion in the left     lower quadrant (2:44) measuring 3.4 x 1.3 cm.     VESSELS:  Atherosclerotic changes.    RETROPERITONEUM: No lymphadenopathy.      ABDOMINAL WALL: Within normal limits.    BONES: Within normal limits.        IMPRESSION:         Recurrent disease in the left upper quadrant is increased and there has     been progression of additional sites of peritoneal disease.        Hepatic metastatic disease without significant change.        No bowel obstruction or evidence of intra-abdominal collection.        Patient then had an Upper GI series which revealed the following:    FINDINGS:    The preliminary  radiograph demonstrates anastomotic chain sutures     in the left upper quadrant in an otherwise unremarkable abdomen.        The patient was unable to tolerate more than a few small sips of contrast     agent due to discomfort related to nausea.        Contrast traverses the esophagus and passed the gastroesophageal junction     without delay. The distal esophagus demonstrates a normal gradual distal     tapering.        Transit time within the stomach is within normal limits without evidence     of gastric outlet obstruction.        The duodenum and proximal jejunum are visualized. No evidence for     obstruction of the upper gastrointestinal tract to elucidate the etiology     of the patient's inability to tolerate oral intake.        IMPRESSION:     No evidence of upper gastrointestinal tract obstruction.        At this time, patient is currently ambulating, voiding, tolerating a regular diet. Pain well controlled on PO pain meds. Patient has been deemed stable for discharge home with follow up as an outpatient.

## 2019-07-08 NOTE — PROGRESS NOTE ADULT - PROVIDER SPECIALTY LIST ADULT
Anesthesia
Pain Medicine
Surgery
Pain Medicine
Surgery

## 2019-07-08 NOTE — PROGRESS NOTE ADULT - ASSESSMENT
Assessment and Plan:   · Assessment		  68F s/p open liver biopsy, omental biopsy, exploratory laparotomy 6/26 for metastatic GIST. CT abdomen 7/2 showed no bowel obstruction or intra-abdominal collection  Plan:  - c/w pain control  - OOB   - Post gastrectomy diet   - calorie counting  - Reglan 10 mg q6h scheduled   - d/c planning with 28 days of lovenox, likely today or tomorrow  Patient seen and examined with Dr. Anton HAMILTON Team Surgical Oncology Pager #[phone]

## 2019-07-08 NOTE — PROVIDER CONTACT NOTE (OTHER) - ASSESSMENT
Pt bp 133/55 HR 55
Patient in bed, patient in pain, and patient is refusing to take her PO medication
VSS, Pt c/o heart palpitations, duration 30 minutes. No longer c/o chest palpitations, pt denies chest pain, SOB, HA, n/v.
pt is up in bed and ambulating in halls

## 2019-07-08 NOTE — PROGRESS NOTE ADULT - REASON FOR ADMISSION
recurrent GIST

## 2019-07-08 NOTE — DISCHARGE NOTE PROVIDER - NSDCFUADDINST_GEN_ALL_CORE_FT
WOUND CARE:  Please keep incisions clean and dry. Please do not Scrub or rub incisions. Do not use lotion or powder on incisions.   BATHING: You may shower and/or sponge bathe. You may use warm soapy water in the shower and rinse, pat dry.  ACTIVITY: No heavy lifting or straining. Otherwise, you may return to your usual level of physical activity. If you are taking narcotic pain medication DO NOT drive a car, operate machinery or make important decisions.  DIET: Return to your usual diet.  NOTIFY YOUR SURGEON IF YOU HAVE: any bleeding that does not stop, any pus draining from your wound(s), any fever (over 100.4 F) persistent nausea/vomiting, or if your pain is not controlled on your discharge pain medications, unable to urinate.  Please follow up with your primary care physician in one week regarding your hospitalization, bring copies of your discharge paperwork.  Please follow up with your surgeon, Dr. Montejo, in 1 week. Please call [phone] to make an appointment.

## 2019-07-08 NOTE — DISCHARGE NOTE PROVIDER - CARE PROVIDER_API CALL
Jose Montejo)  Surgery  [address] [place] [address]  Phone: [phone]  Fax: [phone]  Follow Up Time: 1 week

## 2019-07-09 VITALS
SYSTOLIC BLOOD PRESSURE: 119 MMHG | HEART RATE: 98 BPM | OXYGEN SATURATION: 98 % | DIASTOLIC BLOOD PRESSURE: 62 MMHG | RESPIRATION RATE: 18 BRPM | TEMPERATURE: 98 F

## 2019-07-09 RX ADMIN — LISINOPRIL 20 MILLIGRAM(S): 2.5 TABLET ORAL at 05:24

## 2019-07-09 RX ADMIN — Medication 10 MILLIGRAM(S): at 05:25

## 2019-07-09 RX ADMIN — ENOXAPARIN SODIUM 40 MILLIGRAM(S): 100 INJECTION SUBCUTANEOUS at 11:01

## 2019-07-09 RX ADMIN — PANTOPRAZOLE SODIUM 40 MILLIGRAM(S): 20 TABLET, DELAYED RELEASE ORAL at 05:24

## 2019-07-09 RX ADMIN — AMLODIPINE BESYLATE 5 MILLIGRAM(S): 2.5 TABLET ORAL at 05:25

## 2019-07-22 ENCOUNTER — APPOINTMENT (OUTPATIENT)
Dept: SURGICAL ONCOLOGY | Facility: CLINIC | Age: 69
End: 2019-07-22
Payer: MEDICARE

## 2019-07-22 VITALS
SYSTOLIC BLOOD PRESSURE: 170 MMHG | DIASTOLIC BLOOD PRESSURE: 93 MMHG | HEART RATE: 75 BPM | RESPIRATION RATE: 16 BRPM | HEIGHT: 65 IN | WEIGHT: 151 LBS | OXYGEN SATURATION: 97 % | BODY MASS INDEX: 25.16 KG/M2 | TEMPERATURE: 98 F

## 2019-07-22 DIAGNOSIS — C49.A3 GASTROINTESTINAL STROMAL TUMOR OF SMALL INTESTINE: ICD-10-CM

## 2019-07-22 PROCEDURE — 99024 POSTOP FOLLOW-UP VISIT: CPT

## 2019-07-22 RX ORDER — OXYCODONE 5 MG/1
5 TABLET ORAL
Qty: 20 | Refills: 0 | Status: ACTIVE | COMMUNITY
Start: 2019-07-22 | End: 1900-01-01

## 2019-07-22 RX ORDER — ONDANSETRON 4 MG/1
4 TABLET ORAL
Qty: 20 | Refills: 0 | Status: ACTIVE | COMMUNITY
Start: 2019-07-22 | End: 1900-01-01

## 2019-07-22 NOTE — CONSULT LETTER
[Consult Letter:] : I had the pleasure of evaluating your patient, [unfilled]. [Please see my note below.] : Please see my note below. [Consult Closing:] : Thank you very much for allowing me to participate in the care of this patient.  If you have any questions, please do not hesitate to contact me. [Sincerely,] : Sincerely, [DrLilia  ___] : Dr. CAVANAUGH [DrLilia ___] : Dr. CAVANAUGH [Dear  ___] : Dear ~ADRIANA, [FreeTextEntry2] : Wesley Tolbert MD [FreeTextEntry1] : Ronnie is a pleasant 68 year-old female who presents for initial postop visit.  \par \par She was seen for initial consultation on 8/3/17 for evaluation of a gastric GIST.  She reportrf an isolated episode of hematemesis and recent diagnosis of anemia.   Upper endoscopy demonstrated a lesion in the gastric fundus.  EUS on 7/14/17 which revealed a 4.5 cm subepithelial lesion in the gastric fundus.  The lesion was tattooed and FNA of the lesion was positive for neoplasm and consistent with a GIST.\par \par She is s/p  status post robotic resection of gastric fundus GIST on 9/6/17.  Final pathology was consistent with a 3.5 cm high grade GIST with 7 negative lymph nodes and negative margins (T2N0).  Today she reports constipation especially given that takes iron supplements.  She has not been on a bowel regimen. \par \par She underwent a CT C/A/P on 4/24/19 and was noted with a soft tissue mass along the fundus and lesser curvature of the stomach and abutting the lateral segment of the left lobe of liver as well as along the GE junction.  There was stable appearing hypodense mass within the anterior segment of the right lobe of liver consistent with hepatic metastasis and a new subcentimeter hypodensity in the medial segment of the left lobe of the liver suspicious for new mets. She underwent a EGD with biopsy of the gastric mass which was consistent with a GIST.  She is now s/p exploratory laparotomy, biopsy of omentum and liver on 6/26/19.   Final path consistent with metastatic GIST. She continues to follow with Dr. Tolbert (med/onc)\par \par Her PMH is otherwise notable for HTN, reflux, osteoarthritis (currently on a course of prednisone for knee pain).  Prior surgeries include an open myomectomy in the remote past.  She has no family history of malignancies.  Her last colonoscopy was performed last year in [place] (cannot recall the name of the performing GI), and was reportedly negative.\par \par On exam, her incision is healing well. There is no erythema, induration, or purulence.\par \par We discussed the need for further evaluation and possible [place] [facility]  As per our discussions, she will followup with Dr. Fausto Gastelum.  She and her sister understand there are no further surgical options outside a clinical trial. [FreeTextEntry3] : Jose Montejo MD\par Surgical Oncology\par [facility]\par Office: [phone]\par Cell: [phone]

## 2019-07-22 NOTE — HISTORY OF PRESENT ILLNESS
[de-identified] : Ronnie is a pleasant 68 year-old female who presents for initial postop visit.  \par \par She was seen for initial consultation on 8/3/17 for evaluation of a gastric GIST.  She reportrf an isolated episode of hematemesis and recent diagnosis of anemia.   Upper endoscopy demonstrated a lesion in the gastric fundus.  EUS on 7/14/17 which revealed a 4.5 cm subepithelial lesion in the gastric fundus.  The lesion was tattooed and FNA of the lesion was positive for neoplasm and consistent with a GIST.\par \par She is s/p  status post robotic resection of gastric fundus GIST on 9/6/17.  Final pathology was consistent with a 3.5 cm high grade GIST with 7 negative lymph nodes and negative margins (T2N0).  Today she reports constipation especially given that takes iron supplements.  She has not been on a bowel regimen. \par \par She underwent a CT C/A/P on 4/24/19 and was noted with a soft tissue mass along the fundus and lesser curvature of the stomach and abutting the lateral segment of the left lobe of liver as well as along the GE junction.  There was stable appearing hypodense mass within the anterior segment of the right lobe of liver consistent with hepatic metastasis and a new subcentimeter hypodensity in the medial segment of the left lobe of the liver suspicious for new mets. She underwent a EGD with biopsy of the gastric mass which was consistent with a GIST.  She is now s/p exploratory laparotomy, biopsy of omentum and liver on 6/26/19.   Final path consistent with metastatic GIST. She continues to follow with Dr. Tolbert (med/onc)\par \par Her PMH is otherwise notable for HTN, reflux, osteoarthritis (currently on a course of prednisone for knee pain).  Prior surgeries include an open myomectomy in the remote past.  She has no family history of malignancies.  Her last colonoscopy was performed last year in [place] (cannot recall the name of the performing GI), and was reportedly negative.

## 2019-07-22 NOTE — PHYSICAL EXAM
[Normal] : well developed, well nourished, in no acute distress [de-identified] : Abdomen soft, NT, ND.  Mid abdominal incision well healed.

## 2019-07-22 NOTE — REASON FOR VISIT
[Post-Op] : a post-op for [FreeTextEntry2] : s/p exploratory laparotomy, biopsy of omentum and liver on 6/26/19.

## 2019-07-22 NOTE — ASSESSMENT
[FreeTextEntry1] : We discussed the need for further evaluation and possible [place] [facility]  As per our discussions, she will followup with Dr. Fausto Gastelum.  She and her sister understand there are no further surgical options outside a clinical trial.

## 2019-07-23 ENCOUNTER — OUTPATIENT (OUTPATIENT)
Dept: OUTPATIENT SERVICES | Facility: HOSPITAL | Age: 69
LOS: 1 days | End: 2019-07-23
Payer: COMMERCIAL

## 2019-07-23 DIAGNOSIS — Z98.890 OTHER SPECIFIED POSTPROCEDURAL STATES: Chronic | ICD-10-CM

## 2019-07-23 DIAGNOSIS — R06.00 DYSPNEA, UNSPECIFIED: ICD-10-CM

## 2019-07-23 PROCEDURE — A9502: CPT

## 2019-07-23 PROCEDURE — 93017 CV STRESS TEST TRACING ONLY: CPT

## 2019-07-23 PROCEDURE — 78452 HT MUSCLE IMAGE SPECT MULT: CPT

## 2019-08-05 NOTE — ED PROVIDER NOTE - NEURO NEGATIVE STATEMENT, MLM
no no loss of consciousness, no gait abnormality, no headache, no sensory deficits, and no weakness.

## 2019-09-04 NOTE — H&P PST ADULT - IS PATIENT PREGNANT?
"Received call from Dr. Leonardo (pt's internist in [facility]) to nurse ext from phone staff.   Dr. Leonardo stated that pt came to ER locally there over the weekend and was d/c'd on 9/1.   Pt had blood cultures and urine cultures done- blood cultures negative thus far; urine culture sensitive to E.Coli.   Dr. Leonardo followed up with pt yesterday and pt was "feeling great" and he started pt on 2 weeks of oral cipro.   Dr. Leonardo stated if Dr. Parkinson has any changes or needs to speak with him she can contact him at [phone].   Thanked Dr. Leonardo for call and following up with our office.       " no

## 2019-11-08 NOTE — ASU PATIENT PROFILE, ADULT - SPIRITUAL CULTURAL, CURRENT SITUATION, PROFILE
Quality 431: Preventive Care And Screening: Unhealthy Alcohol Use - Screening: Patient screened for unhealthy alcohol use using a single question and scores less than 2 times per year Quality 226: Preventive Care And Screening: Tobacco Use: Screening And Cessation Intervention: Patient screened for tobacco use and is an ex/non-smoker Quality 130: Documentation Of Current Medications In The Medical Record: Current Medications Documented Detail Level: Detailed none

## 2020-06-19 ENCOUNTER — APPOINTMENT (OUTPATIENT)
Dept: ORTHOPEDIC SURGERY | Facility: CLINIC | Age: 70
End: 2020-06-19

## 2020-06-30 ENCOUNTER — APPOINTMENT (OUTPATIENT)
Dept: ORTHOPEDIC SURGERY | Facility: CLINIC | Age: 70
End: 2020-06-30
Payer: MEDICARE

## 2020-06-30 VITALS
HEART RATE: 72 BPM | WEIGHT: 150 LBS | BODY MASS INDEX: 24.99 KG/M2 | DIASTOLIC BLOOD PRESSURE: 68 MMHG | SYSTOLIC BLOOD PRESSURE: 138 MMHG | HEIGHT: 65 IN

## 2020-06-30 VITALS — TEMPERATURE: 95.3 F

## 2020-06-30 PROCEDURE — 99204 OFFICE O/P NEW MOD 45 MIN: CPT

## 2020-06-30 PROCEDURE — 73564 X-RAY EXAM KNEE 4 OR MORE: CPT | Mod: RT

## 2020-06-30 RX ORDER — HYALURONATE SOD, CROSS-LINKED 30 MG/3 ML
30 SYRINGE (ML) INTRAARTICULAR
Qty: 1 | Refills: 0 | Status: ACTIVE | COMMUNITY
Start: 2020-06-30 | End: 1900-01-01

## 2020-06-30 RX ORDER — DICLOFENAC SODIUM 50 MG/1
50 TABLET, DELAYED RELEASE ORAL
Qty: 60 | Refills: 1 | Status: ACTIVE | COMMUNITY
Start: 2020-06-30 | End: 1900-01-01

## 2020-06-30 NOTE — DISCUSSION/SUMMARY
[de-identified] : Right knee osteoarthritis\par \par The patient and I discussed the causes and progression of degenerative joint disease of the knee. Models, diagrams and drawings were used in the discussion. Treatment can include conservative non-operative management and surgical options. Conservative management includes weight loss, activity modification, physical therapy to improve motion and strength in the muscles around the knee and the body's core, PO and topical NSAIDs, corticosteroid and/or viscosupplementation intra-articular injections. If the patient fails to improve with non-operative management, surgical management is possible. Depending upon the patient's age, BMI, activity level, degree and location of arthrosis different surgical options are possible including arthroscopic debridement with chondroplasty, high-tibial osteotomy, unicondylar/partial arthroplasty, and total joint arthroplasty.\par \par Physical therapy was prescribed for knee ROM exercises, strengthening exercises, deep tissue massage, core strengthening, hip abductor strengthening, VMO strengthening, modalities PRN, and home exercises\par \par The patient was prescribed Diclofenac PO non-steroidal anti-inflammatory medication. 50mg tablets twice daily to be taken for at least 1-2 weeks in a row and then PRN afterwards. Risks and benefits were discussed and include but not limited to renal damage and GI ulceration and bleeding.  They were advised to take with food to limit stomach upset as well as warned to stop the medication if worsening gastric pain or dizziness or other side effects. Also to immediately stop the medication and seek appropriate medical attention if any severe stomach ache, gastritis, black/red vomit, black/red stools or any other medical concern.\par \par Due to failure of prior non-operative modalities of treatment including physical therapy, activity modification, non-steroidal anti-inflammatory medications, and weight-loss with failure of multiple prior corticosteroid injections without appropriate improvement in symptoms and concern for too many frequent injections causing increased risk for adverse events, I am ordering a viscosupplementation injection Gel 1 right knee and will obtain insurance authorization. The patient will be contacted by our authorization department over its status, copayment and when available for injection.\par \par The patient verifies their understanding the the visit, diagnosis and plan. They agree with the treatment plan and will contact the office with any questions or problems.\par Follow up\par PRN

## 2020-06-30 NOTE — PHYSICAL EXAM
[de-identified] : Physical Examination\par General: well nourished, in no acute distress, alert and oriented to person, place and time\par Psychiatric: normal mood and affect, no abnormal movements or speech patterns\par Eyes: vision intact - glasses\par Throat: no thyromegaly\par Lymph: no enlarged nodes, no lymphedema in extremity\par Respiratory: no wheezing, no shortness of breath with ambulation\par Cardiac: no cardiac leg swelling, 2+ peripheral pulses\par Neurology: normal gross sensation in extremities to light touch\par Abdomen: soft, non-tender, tympanic, no masses\par \par Musculoskeletal Examination\par Ambulation	+ antalgic gait, - assistive devices\par \par Knee			Right			Left\par General\par      Swelling/Deformity	normal			normal	\par      Skin			normal			normal\par      Erythema		-			-\par      Standing Alignment	mild valgus			neutral\par      Effusion		trace			none\par Range of Motion\par      Hip			full painless ROM		full painless ROM\par      Knee Flexion		130			130\par      Knee Extension	0			0\par Patella\par      J Sign		-			-\par      Quad Medial/Lateral	1/1 1/1\par      Apprehension		-			-\par      Kranthi's		+			-\par      Grind Sign		+			-\par      Crepitus		+			-\par Palpation\par      Medial Joint Line	-			-\par      Medial Fem Condyle	-			-\par      Lateral Joint Line	+			-\par      Quad Tendon		-			-\par      Patella Tendon	-			-\par      Medial Patella		-			-\par      Lateral Patella 	-			-\par      Posterior Knee	+			-\par Ligamentous\par      Varus @ 0° / 30°	-/-			-/-\par      Valgus @ 0° / 30°	-/-			-/-\par      Lachman		-			-\par      Pivot Shift		-			-\par      Anterior Drawer	-			-\par      Posterior Drawer	-			-\par Meniscus\par      Asad		-			-\par      Flexion Pinch		-			-\par Strength Examination/Atrophy\par      Hip Flexors 		5+			5+\par      Quadriceps		5+			5+\par      Hamstring		5+			5+\par      Tibialis Anterior	5+			5+\par      Achilles/Soleus	5+			5+\par Sensation\par      Deep Peroneal	normal			normal\par      Superficial Peroneal 	normal			normal\par      Sural  		normal			normal\par      Posterior Tibial 	normal			normal\par      Saphneous 		normal			normal\par Pulses\par      DP			2+			2+\par  [de-identified] : 5 views of the affected Right knee (standing AP, flexing standing AP, 30degree flexed lateral, 0degree lateral, sunrise view)\par were ordered, obtained and evaluated by myself today and\par demonstrate:\par There is severe lateral flexed knee asymmetric narrowing\par Small osteophytic lipping\par Trace suprapatellar effusion\par Mild patellofemoral joint space loss without evidence of tilt [or] subluxation on sunrise view\par Normal soft tissue density\par Otherwise normal osseous bone structure without fracture or dislocation

## 2020-06-30 NOTE — HISTORY OF PRESENT ILLNESS
[de-identified] : CC Right knee\par \par HPI 68 yo female presents with chronic onset of in a months of activity-related pain in the entire right Right knee [without injury]. The pain is worse, and rated a 8 out of 10, described as stabbing burning and throbbing, [without radiation]. Rest makes the pain better and extending stairs makes the pain worse. The patient reports associated symptoms of swelling. The patient - pain at night affecting sleep, and + similar pain previously treated conservatively.\par \par The patient has tried the following treatments:\par Activity modification	+\par Ice/Compression  	+\par Braces    		-\par Nsaids    		+\par Physical Therapy 	+ 6 months without improvement home exercises\par Cortisone Injection	+ prior injection of the right knee x2 without improvement\par Visco Injection		-\par Zilretta			-\par Arthroscopy		-\par \par Review of Systems is positive for the above musculoskeletal symptoms and is otherwise non-contributory for general, constitutional, psychiatric, neurologic, HEENT, cardiac, respiratory, gastrointestinal, reproductive, lymphatic, and dermatologic complaints.\par \par Consult by Dr Fany Delarosa

## 2020-09-18 ENCOUNTER — APPOINTMENT (OUTPATIENT)
Dept: ORTHOPEDIC SURGERY | Facility: CLINIC | Age: 70
End: 2020-09-18

## 2020-10-13 ENCOUNTER — APPOINTMENT (OUTPATIENT)
Dept: ORTHOPEDIC SURGERY | Facility: CLINIC | Age: 70
End: 2020-10-13
Payer: MEDICARE

## 2020-10-13 VITALS — BODY MASS INDEX: 22.49 KG/M2 | WEIGHT: 135 LBS | HEIGHT: 65 IN

## 2020-10-13 VITALS — TEMPERATURE: 97.7 F

## 2020-10-13 DIAGNOSIS — G89.29 PAIN IN RIGHT KNEE: ICD-10-CM

## 2020-10-13 DIAGNOSIS — M25.561 PAIN IN RIGHT KNEE: ICD-10-CM

## 2020-10-13 PROCEDURE — 73564 X-RAY EXAM KNEE 4 OR MORE: CPT | Mod: RT

## 2020-10-13 PROCEDURE — 20610 DRAIN/INJ JOINT/BURSA W/O US: CPT | Mod: RT

## 2020-10-13 PROCEDURE — 99214 OFFICE O/P EST MOD 30 MIN: CPT | Mod: 25

## 2020-10-13 NOTE — PHYSICAL EXAM
[de-identified] : Patient is well nourished, well-developed, in no acute distress, with appropriate mood and affect. The patient is oriented to time, place, and person. Respirations are even and unlabored. Gait evaluation does reveal a limp. There is no inguinal adenopathy. Examination of the contralateral knee shows normal range of motion, strength, no tenderness, and intact skin. The affected limb is well-perfused, without skin lesions, shows a grossly normal motor and sensory examination. Knee motion is significantly reduced and does cause significant pain. The knee moves from  degrees. The knee is stable within that range-of-motion to AP and ML stress. The alignment of the knee is 5 degrees varus. Muscle strength is normal. Pedal pulses are palpable. Hip examination was negative.\par  [de-identified] : Long standing knee, AP knee, lateral knee, and patellar views of the right knee were ordered and taken in the office and demonstrate degenerative joint disease of the knee with joint space narrowing, osteophyte formation, and subchondral sclerosis.

## 2020-10-13 NOTE — DISCUSSION/SUMMARY
[de-identified] : This patient has right knee osteoarthritis.  The patient is not an appropriate candidate for surgical intervention at this time. An extensive discussion was conducted on the natural history of the disease and the variety of surgical and non-surgical options available to the patient including, but not limited to non-steroidal anti-inflammatory medications, steroid injections, physical therapy, maintenance of ideal body weight, and reduction of activity.  Today we performed a right intra-articular cortisone injection.  Physical therapy prescribed.  The patient will schedule an appointment as needed.\par \par Informed consent for the right knee injection was obtained. All questions were answered. A time out was performed. The right knee was prepped and draped in sterile fashion. Using sterile technique, the right knee was injected with 2cc of Kenalog, 4cc of 1% lidocaine, 4cc of 0.25% marcaine using a 21-gauge needle. A sterile dressing was applied. Post injection instructions were reviewed. The patient tolerated the procedure well.\par

## 2020-10-13 NOTE — HISTORY OF PRESENT ILLNESS
[de-identified] : This is very nice 70-year-old female experiencing chronic right knee pain, which is severe in intensity. The pain substantially limits activities of daily living. Walking tolerance is reduced.  She takes fentanyl for the pain.  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  Also takes Tylenol.  She ambulates without a cane.  Does not use a walker.  Does not do physical therapy.  The patient denies any radiation of the pain to the feet and it is not associated with numbness, tingling, or weakness.  The knee does not give way.  She does not use a knee sleeve.

## 2020-12-17 NOTE — H&P ADULT - NSHPPHYSICALEXAM_GEN_ALL_CORE
From: Josh Sherwood  To: Amilcar Tucker DO  Sent: 12/16/2020 6:40 PM EST  Subject: Prescription Question    I have ask for a refill on my meds it said it was refill on the 12/11/20 but i did not get the meds of ( famotidine )  please set up a refill on the meds.    thank you     nacho tinsley General: middle aged female, NAD  HEENT: PERRL, EOMI, dry MM  Neck: supple, no JVD  Cardiac: RRR, normal s1 and s2, no murmur  Lungs: CTAB, no wheezes, rales or rhonchi  Abdomen: soft, nondistended abdomen. Bowel sounds present. TTP in the lower abdomen, more prominent on the left side. No organomegaly appreciated  Extremities: no cyanosis, pitting or edema. Palpable pulses bilaterally  Neuro: AAO x 3, Cr II - XII intact, 5/5 strength in UE and LE   Skin: no open lesions or sores

## 2021-01-05 ENCOUNTER — RX RENEWAL (OUTPATIENT)
Age: 71
End: 2021-01-05

## 2021-03-02 ENCOUNTER — RX RENEWAL (OUTPATIENT)
Age: 71
End: 2021-03-02

## 2021-03-03 ENCOUNTER — APPOINTMENT (OUTPATIENT)
Dept: ORTHOPEDIC SURGERY | Facility: CLINIC | Age: 71
End: 2021-03-03
Payer: MEDICARE

## 2021-03-03 PROCEDURE — 99214 OFFICE O/P EST MOD 30 MIN: CPT | Mod: 25

## 2021-03-03 PROCEDURE — 20610 DRAIN/INJ JOINT/BURSA W/O US: CPT | Mod: RT

## 2021-03-03 PROCEDURE — 99072 ADDL SUPL MATRL&STAF TM PHE: CPT

## 2021-03-03 RX ORDER — HYALURONATE SODIUM 20 MG/2 ML
20 SYRINGE (ML) INTRAARTICULAR
Qty: 1 | Refills: 0 | Status: ACTIVE | COMMUNITY
Start: 2021-03-03

## 2021-03-03 NOTE — HISTORY OF PRESENT ILLNESS
[de-identified] : This is very nice 70-year-old female experiencing chronic right knee pain, which is severe in intensity.  I have previously diagnosed with right knee osteoarthritis.  We are attempting to treat her conservatively.  The pain substantially limits activities of daily living. Walking tolerance is reduced.  She takes fentanyl for the pain.  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  Also takes Tylenol.  She ambulates without a cane.  Does not use a walker.  Does not do physical therapy.  The patient denies any radiation of the pain to the feet and it is not associated with numbness, tingling, or weakness.  The knee does not give way.  She does not use a knee sleeve.  Cortisone injection helped for 2 months.

## 2021-03-03 NOTE — DISCUSSION/SUMMARY
[de-identified] : This patient has right knee osteoarthritis.  The patient is not an appropriate candidate for surgical intervention at this time. An extensive discussion was conducted on the natural history of the disease and the variety of surgical and non-surgical options available to the patient including, but not limited to non-steroidal anti-inflammatory medications, steroid injections, physical therapy, maintenance of ideal body weight, and reduction of activity.  Today we performed a right intra-articular cortisone injection.  Physical therapy prescribed.  The patient will schedule an appointment as needed. I will apply to insurance for authorization for euflexxa.\par \par Informed consent for the right knee injection was obtained. All questions were answered. A time out was performed. The right knee was prepped and draped in sterile fashion. Using sterile technique, the right knee was injected with 2cc of Kenalog, 4cc of 1% lidocaine, 4cc of 0.25% marcaine using a 21-gauge needle. A sterile dressing was applied. Post injection instructions were reviewed. The patient tolerated the procedure well.\par

## 2021-03-03 NOTE — PHYSICAL EXAM
[de-identified] : Patient is well nourished, well-developed, in no acute distress, with appropriate mood and affect. The patient is oriented to time, place, and person. Respirations are even and unlabored. Gait evaluation does reveal a limp. There is no inguinal adenopathy. Examination of the contralateral knee shows normal range of motion, strength, no tenderness, and intact skin. The affected limb is well-perfused, without skin lesions, shows a grossly normal motor and sensory examination. Knee motion is significantly reduced and does cause significant pain. The knee moves from  degrees. The knee is stable within that range-of-motion to AP and ML stress. The alignment of the knee is 5 degrees varus. Muscle strength is normal. Pedal pulses are palpable. Hip examination was negative.\par  [de-identified] : Long standing knee, AP knee, lateral knee, and patellar views of the right knee were brought in by the patient which I reviewed and demonstrate degenerative joint disease of the knee with joint space narrowing, osteophyte formation, and subchondral sclerosis.

## 2021-03-16 ENCOUNTER — APPOINTMENT (OUTPATIENT)
Dept: ORTHOPEDIC SURGERY | Facility: CLINIC | Age: 71
End: 2021-03-16
Payer: MEDICARE

## 2021-03-16 PROCEDURE — 99072 ADDL SUPL MATRL&STAF TM PHE: CPT

## 2021-03-16 PROCEDURE — 20610 DRAIN/INJ JOINT/BURSA W/O US: CPT | Mod: RT

## 2021-03-16 NOTE — PROCEDURE
[de-identified] : Injection: right knee joint. \par \par Indication: Osteoarthritis. \par \par A discussion was had with the patient regarding this procedure and all questions were answered. All risks, benefits and alternatives were discussed. These included but were not limited to bleeding, infection, and allergic reaction. Alcohol was used to clean the skin, and betadine was used to sterilize and prep the area in the anterior-medial aspect of the knee. Ethyl chloride spray was then used as a topical anesthetic. A 22-gauge needle was used to inject 2 cc of Synvisc-3 into the knee with ease. A sterile bandage was then applied. The patient tolerated the procedure well and there were no complications. \par \par Lot #:  [id number]\par Exp: \par \par The INJECTION # one  of three Synvisc-3 injections was given today under sterile conditions into the knee joint without complication (see procedure note). I again discussed the role of activity modification/icing following the injection to treat any local irritation from the injection. \par \par

## 2021-03-23 ENCOUNTER — APPOINTMENT (OUTPATIENT)
Dept: ORTHOPEDIC SURGERY | Facility: CLINIC | Age: 71
End: 2021-03-23
Payer: MEDICARE

## 2021-03-23 ENCOUNTER — NON-APPOINTMENT (OUTPATIENT)
Age: 71
End: 2021-03-23

## 2021-03-23 PROCEDURE — 20610 DRAIN/INJ JOINT/BURSA W/O US: CPT | Mod: RT

## 2021-03-23 PROCEDURE — 99072 ADDL SUPL MATRL&STAF TM PHE: CPT

## 2021-03-23 NOTE — PROCEDURE
[de-identified] : Injection: right knee joint. \par \par Indication: Osteoarthritis. \par \par A discussion was had with the patient regarding this procedure and all questions were answered. All risks, benefits and alternatives were discussed. These included but were not limited to bleeding, infection, and allergic reaction. Alcohol was used to clean the skin, and betadine was used to sterilize and prep the area in the anterior-medial aspect of the knee. Ethyl chloride spray was then used as a topical anesthetic. A 22-gauge needle was used to inject 2 cc of Synvisc-3 into the knee with ease. A sterile bandage was then applied. The patient tolerated the procedure well and there were no complications. \par \par Lot #:  [id number]\par Exp: \par \par The INJECTION # two  of three Synvisc-3 injections was given today under sterile conditions into the knee joint without complication (see procedure note). I again discussed the role of activity modification/icing following the injection to treat any local irritation from the injection. \par \par

## 2021-04-06 ENCOUNTER — APPOINTMENT (OUTPATIENT)
Dept: ORTHOPEDIC SURGERY | Facility: CLINIC | Age: 71
End: 2021-04-06
Payer: MEDICARE

## 2021-04-06 DIAGNOSIS — M17.11 UNILATERAL PRIMARY OSTEOARTHRITIS, RIGHT KNEE: ICD-10-CM

## 2021-04-06 PROCEDURE — 20610 DRAIN/INJ JOINT/BURSA W/O US: CPT | Mod: RT

## 2021-04-06 PROCEDURE — 99072 ADDL SUPL MATRL&STAF TM PHE: CPT

## 2021-04-06 NOTE — PROCEDURE
[de-identified] : Injection: right knee joint. \par \par Indication: Osteoarthritis. \par \par A discussion was had with the patient regarding this procedure and all questions were answered. All risks, benefits and alternatives were discussed. These included but were not limited to bleeding, infection, and allergic reaction. Alcohol was used to clean the skin, and betadine was used to sterilize and prep the area in the anterior-medial aspect of the knee. Ethyl chloride spray was then used as a topical anesthetic. A 22-gauge needle was used to inject 2 cc of Synvisc-3 into the knee with ease. A sterile bandage was then applied. The patient tolerated the procedure well and there were no complications. \par \par Lot #:  [id number]\par Exp: \par \par The INJECTION # 3rd  of three Synvisc-3 injections was given today under sterile conditions into the knee joint without complication (see procedure note). I again discussed the role of activity modification/icing following the injection to treat any local irritation from the injection. \par \par

## 2021-09-26 NOTE — ED PROVIDER NOTE - PMH
Anemia    Hypertension    Obese    RA (rheumatoid arthritis)    Stomach cancer    Thyroid nodule
no loss of consciousness, no gait abnormality, no headache, no sensory deficits, and no weakness.

## 2022-03-17 NOTE — ED PROVIDER NOTE - ENMT, MLM
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  Goal INR: 2.0-3.0  Duration: long-term   Referring Provider: Keith Grove MD  Managing Provider: Keith Grove MD  Order expiration date: 01/12/23    Pertinent History:     High-risk maintenance medications: Turmeric 450mg supplement twice daily    Assessment  Yes No   []  [x] Missed doses:    []  [x] Extra doses:   [x]  [] Significant medication changes (Rx, OTC, Herbal): 3/17/22-Pt prescribed prednisone 10mg x5 days (3/1-3/5) for knee pain.   [x]  [] Vitamin K / dietary changes (Vit K goal 2-3 cups/wk; NOTE: pt consumes Hog head cheese occasionally (per 1 oz = 1mcg of Vitamin K): 3/17/22-Pt reports compliance with goal intake. Pt had spinach yesterday, but reports minimal intake d/t teeth cleaning. 2/24/22-Pt reports she has not been meeting her vitamin K goal, but plans to get back on track. Pt did have spinach yesterday. 01/12 - pt may have consumed additional svg of VitK this past week.   []  [x] Bleeding / bruising:    []  [x] Falls / injury:    []  [x] Acute illness:    []  [x] Alcohol intake: Occasional intake; 01/12 - pt reports consumption of 1-2 glasses of wine this past week.   [x]  [] Procedures / hospitalization / ER visits:  3/174/22-Pt had right-side deep teeth cleaning yesterday. Pt noted minimal bleeding near her gums that has since resolved. Pt to have left-side deep cleaning on 4/13. Pt saw Dr. Patiño, orthopedics, on Tue 3/15 for knee pain. Pt had steroid injection in left knee. Pt denies any bleeding/complications. Pt to start PT and f/u with ortho provider in 4 weeks for possible gel injection. Pt aware to notify [facility] if/when having injection.   []  [x] Other:     Plan (5mg tablets)  INR Result: 3.1  The INR result for today is supratherapeutic based on the INR goal  2.0-3.0.    Recommended Dose: Continue 7.5mgF;5mgROW (37.5mg/wk)  Etio: +/- 0.1 considered therapeutic; steroid injection?  Follow-Up: 3 weeks - 4/8/22  Comments:     Counseling  Counseled about signs/symptoms of bruising/bleeding and appropriate management  Stressed importance of compliance with exact recommended dosage regimen  Stressed importance of compliance with consistent vitamin K intake  Discussed measures to reduce risk for falls and appropriate action when serious injury occurs  Instructed to notify clinic about medication changes or health status changes  Instructed to notify clinic about scheduled procedures  Discussed risk of thrombosis and/or bleeding with inappropriate anticoagulant use and monitoring frequency    Provided patient/caregiver with written and verbal dosing instructions, patient/caregiver verbalized understanding.   Airway patent, Nasal mucosa clear. Mouth with normal mucosa. Throat has no vesicles, no oropharyngeal exudates and uvula is midline.

## 2022-10-11 NOTE — PROGRESS NOTE ADULT - PROVIDER SPECIALTY LIST ADULT
Anesthesia
Gastroenterology
Hospitalist
Surgery
Gastroenterology
No
Hospitalist
Hospitalist

## 2022-11-02 NOTE — PROVIDER CONTACT NOTE (OTHER) - REASON
If any chest pain, shortness of breath, abdominal pain, back pain, new or worsening symptoms please go to the ER.  Please follow-up with your primary care provider within 1 week as we discussed.    Additional STD testing resources please go [email or web address]    Here is one of the locations, please use the website to find the nearest clinic locations:  [address] [place] [id number]  Phone: [phone]   patient complains of nausea and vomiting

## 2023-06-30 NOTE — H&P PST ADULT - VASCULAR
Mamadou Martinez, MS, RDN, CDN, CNSC (ex. [id number])  Equal and normal pulses (carotid, femoral, dorsalis pedis)

## 2024-01-01 NOTE — PATIENT PROFILE ADULT - NSPROEXTENSIONSOFSELF_GEN_A_NUR
none
-If your baby has: Difficulty breathing; blue lips or tongue, and/or does not respond to touch.

## 2024-06-13 NOTE — ED PROVIDER NOTE - CONSTITUTIONAL, MLM
79 normal... Well appearing, well nourished, awake, alert, oriented to person, place, time/situation and in no apparent distress.

## 2025-07-09 NOTE — DISCHARGE NOTE ADULT - HAS THE PATIENT RECEIVED THE INFLUENZA VACCINE DURING THIS VISIT
[facility] provides services at a reduced cost to those who are determined to be eligible through [facility]’s financial assistance program. Information regarding [facility]’s financial assistance program can be found by going to [email or web address] or by calling [phone]. No